# Patient Record
Sex: FEMALE | Race: WHITE | NOT HISPANIC OR LATINO | Employment: UNEMPLOYED | URBAN - METROPOLITAN AREA
[De-identification: names, ages, dates, MRNs, and addresses within clinical notes are randomized per-mention and may not be internally consistent; named-entity substitution may affect disease eponyms.]

---

## 2020-07-22 ENCOUNTER — OFFICE VISIT (OUTPATIENT)
Dept: URGENT CARE | Facility: CLINIC | Age: 65
End: 2020-07-22
Payer: COMMERCIAL

## 2020-07-22 VITALS
HEIGHT: 62 IN | BODY MASS INDEX: 33.13 KG/M2 | WEIGHT: 180 LBS | HEART RATE: 74 BPM | OXYGEN SATURATION: 98 % | TEMPERATURE: 98.7 F | RESPIRATION RATE: 18 BRPM

## 2020-07-22 DIAGNOSIS — Z20.822 EXPOSURE TO COVID-19 VIRUS: ICD-10-CM

## 2020-07-22 DIAGNOSIS — R05.9 COUGH: Primary | ICD-10-CM

## 2020-07-22 PROCEDURE — 99203 OFFICE O/P NEW LOW 30 MIN: CPT | Performed by: PHYSICIAN ASSISTANT

## 2020-07-22 PROCEDURE — U0003 INFECTIOUS AGENT DETECTION BY NUCLEIC ACID (DNA OR RNA); SEVERE ACUTE RESPIRATORY SYNDROME CORONAVIRUS 2 (SARS-COV-2) (CORONAVIRUS DISEASE [COVID-19]), AMPLIFIED PROBE TECHNIQUE, MAKING USE OF HIGH THROUGHPUT TECHNOLOGIES AS DESCRIBED BY CMS-2020-01-R: HCPCS | Performed by: PHYSICIAN ASSISTANT

## 2020-07-22 NOTE — LETTER
July 22, 2020     Patient: Kimberli Goins   YOB: 1955   Date of Visit: 7/22/2020       To Whom It May Concern: It is my medical opinion that Kimberli Car Should remain out of work for 10 days since symptom onset or 3 days fever free without the use of fever reducing drugs, whichever is longer AND overall general improvement in symptoms OR negative results  If you have any questions or concerns, please don't hesitate to call           Sincerely,        Baltazar Kumar PA-C    CC: No Recipients

## 2020-07-23 LAB — SARS-COV-2 RNA SPEC QL NAA+PROBE: NOT DETECTED

## 2020-07-23 NOTE — PROGRESS NOTES
[facility]        NAME: Demetra Kaur is a 72 y o  female  : 1955    MRN: [id number]  DATE: 2020  TIME: 8:16 PM    Assessment and Plan   Cough [R05]  1  Cough  Novel Coronavirus (COVID-19), PCR [facility]    CANCELED: [facility] COVID-19 TEST- Office Collection   2  Exposure to COVID-19 virus  Novel Coronavirus (COVID-19), PCR [facility]    CANCELED: MISC COVID-19 TEST- Office Collection    CANCELED: [facility] COVID-19 TEST- Office Collection     [facility]  Patient Instructions   Covid 19 results will return in a 7-10 days  We will call you with both negative or positive results  Prophylactically self quarantine  Department of health's newest recommendations state patient should self quarantine for 10 days since symptom onset or 3 days fever free without the use of fever reducing drugs (Tylenol and ibuprofen), whichever is longer AND overall improvement of symptoms  Drink lots of fluids to maintain hydration  Do not touch your face, wash hands often, and practice social distancing  Call your PCP if you have any questions or concerns  Go to ER if having severe symptoms such as chest pain, shortness of breath, or fever that is not responding to antipyretics  Follow up with PCP in 3-5 days  Proceed to  ER if symptoms worsen  Chief Complaint     Chief Complaint   Patient presents with    COVID-19     Exposure to positive patient cough with chest burning         History of Present Illness       Patient is a 80-year-old female with significant past medical history of former smoking presents the office complaining of cough after recent exposure to positive COVID-19  Patient reports associated chest burning with cough only  She fever, chills, SOB, CP, difficulty breathing, anosmia, dysgeusia, or weakness  Review of Systems   Review of Systems   Constitutional: Negative for chills and fever  HENT: Negative for congestion and sore throat      Respiratory: Positive for cough  Negative for shortness of breath  Cardiovascular: Negative for chest pain and palpitations  Gastrointestinal: Negative for abdominal pain, diarrhea, nausea and vomiting  Musculoskeletal: Negative for myalgias  Skin: Negative for rash  Neurological: Negative for dizziness, light-headedness and headaches  Current Medications     No current outpatient medications on file  Current Allergies     Allergies as of 07/22/2020    (No Known Allergies)            The following portions of the patient's history were reviewed and updated as appropriate: allergies, current medications, past family history, past medical history, past social history, past surgical history and problem list      Past Medical History:   Diagnosis Date    No known problems        Past Surgical History:   Procedure Laterality Date    HYSTERECTOMY         No family history on file  Medications have been verified  Objective   Pulse 74   Temp 98 7 °F (37 1 °C)   Resp 18   Ht 5' 2" (1 575 m)   Wt 81 6 kg (180 lb)   SpO2 98%   BMI 32 92 kg/m²        Physical Exam     Physical Exam   Constitutional: She appears well-developed and well-nourished  HENT:   Head: Normocephalic and atraumatic  Right Ear: External ear normal    Left Ear: External ear normal    Nose: Nose normal    Mouth/Throat: Uvula is midline, oropharynx is clear and moist and mucous membranes are normal    Eyes: Lids are normal    Cardiovascular: Normal rate, regular rhythm, normal heart sounds and normal pulses  Exam reveals no gallop and no friction rub  No murmur heard  Pulmonary/Chest: Effort normal and breath sounds normal  She has no wheezes  She has no rhonchi  She has no rales  She exhibits no tenderness  Musculoskeletal: Normal range of motion  Lymphadenopathy:     She has no cervical adenopathy  Neurological: She is alert  Skin: Skin is warm and dry  Capillary refill takes less than 2 seconds  No rash noted  Psychiatric: She has a normal mood and affect  Nursing note and vitals reviewed

## 2020-07-29 ENCOUNTER — TELEPHONE (OUTPATIENT)
Dept: URGENT CARE | Facility: CLINIC | Age: 65
End: 2020-07-29

## 2020-08-06 ENCOUNTER — OFFICE VISIT (OUTPATIENT)
Dept: URGENT CARE | Facility: CLINIC | Age: 65
End: 2020-08-06
Payer: COMMERCIAL

## 2020-08-06 VITALS
TEMPERATURE: 97.1 F | HEART RATE: 78 BPM | BODY MASS INDEX: 33.1 KG/M2 | OXYGEN SATURATION: 98 % | SYSTOLIC BLOOD PRESSURE: 117 MMHG | RESPIRATION RATE: 18 BRPM | DIASTOLIC BLOOD PRESSURE: 72 MMHG | WEIGHT: 179.9 LBS | HEIGHT: 62 IN

## 2020-08-06 DIAGNOSIS — J40 BRONCHITIS: Primary | ICD-10-CM

## 2020-08-06 PROCEDURE — 99213 OFFICE O/P EST LOW 20 MIN: CPT | Performed by: PHYSICIAN ASSISTANT

## 2020-08-06 RX ORDER — AZITHROMYCIN 250 MG/1
TABLET, FILM COATED ORAL
Qty: 6 TABLET | Refills: 0 | Status: SHIPPED | OUTPATIENT
Start: 2020-08-06 | End: 2020-08-10

## 2020-08-06 RX ORDER — BENZONATATE 200 MG/1
200 CAPSULE ORAL 3 TIMES DAILY PRN
Qty: 20 CAPSULE | Refills: 0 | Status: SHIPPED | OUTPATIENT
Start: 2020-08-06 | End: 2020-08-20

## 2020-08-06 RX ORDER — AZITHROMYCIN 250 MG/1
250 TABLET, FILM COATED ORAL EVERY 24 HOURS
Qty: 6 TABLET | Refills: 0 | Status: SHIPPED | OUTPATIENT
Start: 2020-08-06 | End: 2020-08-06

## 2020-08-06 RX ORDER — ALBUTEROL SULFATE 90 UG/1
2 AEROSOL, METERED RESPIRATORY (INHALATION) EVERY 6 HOURS PRN
Qty: 6.7 G | Refills: 0 | Status: SHIPPED | OUTPATIENT
Start: 2020-08-06 | End: 2022-03-17

## 2020-08-06 NOTE — PATIENT INSTRUCTIONS
Take antibiotic as prescribed  Use Tessalon Perles as needed for cough  Use albuterol inhaler as needed for chest tightness and cough  May use over-the-counter Tylenol and ibuprofen for pain  Follow-up with PCP in 3-5 days if symptoms worsen or not improve  Go to ER if symptoms become severe  Acute Bronchitis   WHAT YOU NEED TO KNOW:   Acute bronchitis is swelling and irritation in the air passages of your lungs  This irritation may cause you to cough or have other breathing problems  Acute bronchitis often starts because of another illness, such as a cold or the flu  The illness spreads from your nose and throat to your windpipe and airways  Bronchitis is often called a chest cold  Acute bronchitis lasts about 3 to 6 weeks and is usually not a serious illness  Your cough can last for several weeks  DISCHARGE INSTRUCTIONS:   Return to the emergency department if:   · You cough up blood  · Your lips or fingernails turn blue  · You feel like you are not getting enough air when you breathe  Contact your healthcare provider if:   · You have a fever  · Your breathing problems do not go away or get worse  · Your cough does not get better within 4 weeks  · You have questions or concerns about your condition or care  Self-care:   · Get more rest   Rest helps your body to heal  Slowly start to do more each day  Rest when you feel it is needed  · Avoid irritants in the air  Avoid chemicals, fumes, and dust  Wear a face mask if you must work around dust or fumes  Stay inside on days when air pollution levels are high  If you have allergies, stay inside when pollen counts are high  Do not use aerosol products, such as spray-on deodorant, bug spray, and hair spray  · Do not smoke or be around others who smoke  Nicotine and other chemicals in cigarettes and cigars damages the cilia that move mucus out of your lungs   Ask your healthcare provider for information if you currently smoke and need help to quit  E-cigarettes or smokeless tobacco still contain nicotine  Talk to your healthcare provider before you use these products  · Drink liquids as directed  Liquids help keep your air passages moist and help you cough up mucus  You may need to drink more liquids when you have acute bronchitis  Ask how much liquid to drink each day and which liquids are best for you  · Use a humidifier or vaporizer  Use a cool mist humidifier or a vaporizer to increase air moisture in your home  This may make it easier for you to breathe and help decrease your cough  Decrease risk for acute bronchitis:   · Get the vaccinations you need  Ask your healthcare provider if you should get vaccinated against the flu or pneumonia  · Prevent the spread of germs  You can decrease your risk of acute bronchitis and other illnesses by doing the following:     [facility] your hands often with soap and water  Carry germ-killing hand lotion or gel with you  You can use the lotion or gel to clean your hands when soap and water are not available  ¨ Do not touch your eyes, nose, or mouth unless you have washed your hands first     ¨ Always cover your mouth when you cough to prevent the spread of germs  It is best to cough into a tissue or your shirt sleeve instead of into your hand  Ask those around you cover their mouths when they cough  ¨ Try to avoid people who have a cold or the flu  If you are sick, stay away from others as much as possible  Medicines: Your healthcare provider may  give you any of the following:  · Ibuprofen or acetaminophen  are medicines that help lower your fever  They are available without a doctor's order  Ask your healthcare provider which medicine is right for you  Ask how much to take and how often to take it  Follow directions  These medicines can cause stomach bleeding if not taken correctly  Ibuprofen can cause kidney damage   Do not take ibuprofen if you have kidney disease, an ulcer, or allergies to aspirin  Acetaminophen can cause liver damage  Do not take more than 4,000 milligrams in 24 hours  · Decongestants  help loosen mucus in your lungs and make it easier to cough up  This can help you breathe easier  · Cough suppressants  decrease your urge to cough  If your cough produces mucus, do not take a cough suppressant unless your healthcare provider tells you to  Your healthcare provider may suggest that you take a cough suppressant at night so you can rest     · Inhalers  may be given  Your healthcare provider may give you one or more inhalers to help you breathe easier and cough less  An inhaler gives your medicine to open your airways  Ask your healthcare provider to show you how to use your inhaler correctly  · Take your medicine as directed  Contact your healthcare provider if you think your medicine is not helping or if you have side effects  Tell him of her if you are allergic to any medicine  Keep a list of the medicines, vitamins, and herbs you take  Include the amounts, and when and why you take them  Bring the list or the pill bottles to follow-up visits  Carry your medicine list with you in case of an emergency  Follow up with your healthcare provider as directed:  Write down questions you have so you will remember to ask them during your follow-up visits  © 2017 [address] Information is for End User's use only and may not be sold, redistributed or otherwise used for commercial purposes  All illustrations and images included in CareNotes® are the copyrighted property of [employer]  or [employer] Rosen  The above information is an  only  It is not intended as medical advice for individual conditions or treatments  Talk to your doctor, nurse or pharmacist before following any medical regimen to see if it is safe and effective for you

## 2020-08-06 NOTE — PROGRESS NOTES
[email or web address]        NAME: Tyrel Curry is a 72 y o  female  : 1955    MRN: [id number]  DATE: 2020  TIME: 5:29 PM    Assessment and Plan   Bronchitis [J40]  1  Bronchitis  albuterol (Proventil HFA) 90 mcg/act inhaler    benzonatate (TESSALON) 200 MG capsule    azithromycin (ZITHROMAX) 250 mg tablet    DISCONTINUED: azithromycin (ZITHROMAX) 250 mg tablet     Unable to prescribe prednisone due to history of diabetes  Patient Instructions   Take antibiotic as prescribed  Use Tessalon Perles as needed for cough  Use albuterol inhaler as needed for chest tightness and cough  May use over-the-counter Tylenol and ibuprofen for pain  Follow up with PCP in 3-5 days  Proceed to  ER if symptoms worsen  Chief Complaint     Chief Complaint   Patient presents with    Cough     Seen on the  for cough and never got better had a negative covid test result havingf burning in chest and feels sob sometimes          History of Present Illness       Patient is a 70-year-old female with significant past medical history of diabetes and former smoking presents the office complaining of persistent cough with associated burning in her chest for 1 month  She also reports difficulty breathing because it causes her to cough  She also has some mild postnasal drip  She denies fevers, chills, headache, dizziness, lightheadedness, shortness of breath at rest, chest pain, rhinorrhea, anosmia, dysgeusia, or weakness  She was tested for COVID-19 2 weeks ago and was negative  She has been using over-the-counter Mucinex with mild relief  She denies any new contact to positive coronavirus persons  Review of Systems   Review of Systems   Constitutional: Negative for chills and fever  HENT: Positive for postnasal drip  Negative for congestion and sore throat  Respiratory: Positive for cough  Negative for chest tightness and shortness of breath      Cardiovascular: Negative for chest pain and palpitations  Gastrointestinal: Negative for abdominal pain, diarrhea, nausea and vomiting  Musculoskeletal: Negative for myalgias  Skin: Negative for rash  Neurological: Negative for dizziness, light-headedness and headaches  Current Medications       Current Outpatient Medications:     albuterol (Proventil HFA) 90 mcg/act inhaler, Inhale 2 puffs every 6 (six) hours as needed for wheezing or shortness of breath, Disp: 6 7 g, Rfl: 0    azithromycin (ZITHROMAX) 250 mg tablet, Take 2 tablets today then 1 tablet daily x 4 days, Disp: 6 tablet, Rfl: 0    benzonatate (TESSALON) 200 MG capsule, Take 1 capsule (200 mg total) by mouth 3 (three) times a day as needed for cough for up to 14 days, Disp: 20 capsule, Rfl: 0    Current Allergies     Allergies as of 08/06/2020    (No Known Allergies)            The following portions of the patient's history were reviewed and updated as appropriate: allergies, current medications, past family history, past medical history, past social history, past surgical history and problem list      Past Medical History:   Diagnosis Date    No known problems        Past Surgical History:   Procedure Laterality Date    HYSTERECTOMY         No family history on file  Medications have been verified  Objective   /72   Pulse 78   Temp (!) 97 1 °F (36 2 °C)   Resp 18   Ht 5' 2" (1 575 m)   Wt 81 6 kg (179 lb 14 3 oz)   SpO2 98%   BMI 32 90 kg/m²        Physical Exam     Physical Exam   Constitutional: She appears well-developed  Patient speaking in full sentences without difficulty  Intermittent coughing  HENT:   Head: Normocephalic and atraumatic  Right Ear: Tympanic membrane, external ear and ear canal normal    Left Ear: Tympanic membrane, external ear and ear canal normal    Nose: Nose normal    Mouth/Throat: Uvula is midline  Eyes: Pupils are equal, round, and reactive to light  Conjunctivae and lids are normal    Neck: Neck supple  Cardiovascular: Normal rate, regular rhythm, normal heart sounds and normal pulses  Exam reveals no gallop and no friction rub  No murmur heard  Pulmonary/Chest: Effort normal and breath sounds normal  No respiratory distress  She has no decreased breath sounds  She has no wheezes  She has no rhonchi  She has no rales  She exhibits no tenderness  Abdominal: Normal appearance  Musculoskeletal: Normal range of motion  Lymphadenopathy:     She has no cervical adenopathy  Neurological: She is alert  Skin: Skin is warm and dry  Capillary refill takes less than 2 seconds  No rash noted  Nursing note and vitals reviewed

## 2020-08-12 ENCOUNTER — OFFICE VISIT (OUTPATIENT)
Dept: URGENT CARE | Facility: CLINIC | Age: 65
End: 2020-08-12
Payer: COMMERCIAL

## 2020-08-12 VITALS
TEMPERATURE: 97.9 F | BODY MASS INDEX: 32.94 KG/M2 | DIASTOLIC BLOOD PRESSURE: 74 MMHG | HEART RATE: 96 BPM | HEIGHT: 62 IN | OXYGEN SATURATION: 95 % | RESPIRATION RATE: 16 BRPM | SYSTOLIC BLOOD PRESSURE: 120 MMHG | WEIGHT: 179 LBS

## 2020-08-12 DIAGNOSIS — J20.9 ACUTE BRONCHITIS, UNSPECIFIED ORGANISM: Primary | ICD-10-CM

## 2020-08-12 PROCEDURE — 99213 OFFICE O/P EST LOW 20 MIN: CPT | Performed by: PHYSICIAN ASSISTANT

## 2020-08-12 RX ORDER — DOXYCYCLINE 100 MG/1
100 TABLET ORAL 2 TIMES DAILY
Qty: 14 TABLET | Refills: 0 | Status: SHIPPED | OUTPATIENT
Start: 2020-08-12 | End: 2020-08-19

## 2020-08-12 NOTE — PATIENT INSTRUCTIONS
Follow up with PCP in 3-5 days  Proceed to  ER if symptoms worsen  Patient placed on doxycyline  Continue with albuterol and Tessalon as directed  Continue with symptomatic treatment  Patient will begin flonase as needed for nasal congestion  Tylenol as needed for fever of chills     Acute Bronchitis   WHAT YOU NEED TO KNOW:   Acute bronchitis is swelling and irritation in the air passages of your lungs  This irritation may cause you to cough or have other breathing problems  Acute bronchitis often starts because of another illness, such as a cold or the flu  The illness spreads from your nose and throat to your windpipe and airways  Bronchitis is often called a chest cold  Acute bronchitis lasts about 3 to 6 weeks and is usually not a serious illness  Your cough can last for several weeks  DISCHARGE INSTRUCTIONS:   Return to the emergency department if:   · You cough up blood  · Your lips or fingernails turn blue  · You feel like you are not getting enough air when you breathe  Contact your healthcare provider if:   · You have a fever  · Your breathing problems do not go away or get worse  · Your cough does not get better within 4 weeks  · You have questions or concerns about your condition or care  Self-care:   · Get more rest   Rest helps your body to heal  Slowly start to do more each day  Rest when you feel it is needed  · Avoid irritants in the air  Avoid chemicals, fumes, and dust  Wear a face mask if you must work around dust or fumes  Stay inside on days when air pollution levels are high  If you have allergies, stay inside when pollen counts are high  Do not use aerosol products, such as spray-on deodorant, bug spray, and hair spray  · Do not smoke or be around others who smoke  Nicotine and other chemicals in cigarettes and cigars damages the cilia that move mucus out of your lungs  Ask your healthcare provider for information if you currently smoke and need help to quit  E-cigarettes or smokeless tobacco still contain nicotine  Talk to your healthcare provider before you use these products  · Drink liquids as directed  Liquids help keep your air passages moist and help you cough up mucus  You may need to drink more liquids when you have acute bronchitis  Ask how much liquid to drink each day and which liquids are best for you  · Use a humidifier or vaporizer  Use a cool mist humidifier or a vaporizer to increase air moisture in your home  This may make it easier for you to breathe and help decrease your cough  Decrease risk for acute bronchitis:   · Get the vaccinations you need  Ask your healthcare provider if you should get vaccinated against the flu or pneumonia  · Prevent the spread of germs  You can decrease your risk of acute bronchitis and other illnesses by doing the following:     [facility] your hands often with soap and water  Carry germ-killing hand lotion or gel with you  You can use the lotion or gel to clean your hands when soap and water are not available  ¨ Do not touch your eyes, nose, or mouth unless you have washed your hands first     ¨ Always cover your mouth when you cough to prevent the spread of germs  It is best to cough into a tissue or your shirt sleeve instead of into your hand  Ask those around you cover their mouths when they cough  ¨ Try to avoid people who have a cold or the flu  If you are sick, stay away from others as much as possible  Medicines: Your healthcare provider may  give you any of the following:  · Ibuprofen or acetaminophen  are medicines that help lower your fever  They are available without a doctor's order  Ask your healthcare provider which medicine is right for you  Ask how much to take and how often to take it  Follow directions  These medicines can cause stomach bleeding if not taken correctly  Ibuprofen can cause kidney damage   Do not take ibuprofen if you have kidney disease, an ulcer, or allergies to aspirin  Acetaminophen can cause liver damage  Do not take more than 4,000 milligrams in 24 hours  · Decongestants  help loosen mucus in your lungs and make it easier to cough up  This can help you breathe easier  · Cough suppressants  decrease your urge to cough  If your cough produces mucus, do not take a cough suppressant unless your healthcare provider tells you to  Your healthcare provider may suggest that you take a cough suppressant at night so you can rest     · Inhalers  may be given  Your healthcare provider may give you one or more inhalers to help you breathe easier and cough less  An inhaler gives your medicine to open your airways  Ask your healthcare provider to show you how to use your inhaler correctly  · Take your medicine as directed  Contact your healthcare provider if you think your medicine is not helping or if you have side effects  Tell him of her if you are allergic to any medicine  Keep a list of the medicines, vitamins, and herbs you take  Include the amounts, and when and why you take them  Bring the list or the pill bottles to follow-up visits  Carry your medicine list with you in case of an emergency  Follow up with your healthcare provider as directed:  Write down questions you have so you will remember to ask them during your follow-up visits  © 2017 [address] Information is for End User's use only and may not be sold, redistributed or otherwise used for commercial purposes  All illustrations and images included in CareNotes® are the copyrighted property of [employer]  or Andry Rosen  The above information is an  only  It is not intended as medical advice for individual conditions or treatments  Talk to your doctor, nurse or pharmacist before following any medical regimen to see if it is safe and effective for you

## 2020-08-12 NOTE — PROGRESS NOTES
[facility]      NAME: Che Kothari is a 72 y o  female  : 1955    MRN: [id number]  DATE: 2020  TIME: 2:23 PM    Assessment and Plan   Acute bronchitis, unspecified organism [J20 9]  1  Acute bronchitis, unspecified organism  doxycycline (ADOXA) 100 MG tablet     Offered patient EKG and chest x-ray  Patient declined this time  Patient is not compliant with diabetes  Steroids not a viable option at this time  Patient Instructions     Follow up with PCP in 3-5 days  Proceed to  ER if symptoms worsen  Patient placed on doxycyline  Continue with albuterol and Tessalon as directed  Continue with symptomatic treatment  Patient will begin flonase as needed for nasal congestion  Tylenol as needed for fever of chills     Chief Complaint     Chief Complaint   Patient presents with    Cough     seen here  with chest congestion, tested negative for covid, treated with abx for bronchitis with improvement but return of the symptoms after abx complete  History of Present Illness       Patient 77-year-old female presenting office for cough  Patient states that symptoms ongoing past 3 weeks in duration  Patient states she is by the urgent care approximately 1 week ago and was prescribed antibiotics at that time  Patient states that she discontinue the antibiotics approximately 2 days as per instructions  Patient states that she began to have the recurrence of the cough and chest burning which has been ongoing past 24 hours in duration  Patient states her symptomatology is the same as was at the beginning of the onset of symptoms  Patient denies any fevers any chills  Patient denies any problems with her eyes ears nose throat  Patient does admit to having intermittent bouts of shortness of breath and chest tightness  Patient states she has been taking an albuterol inhaler with moderate relief of symptoms    Patient states that her cough is intermittently productive, but is more prevalent at nighttime  Patient denies any nausea vomiting diarrhea  Patient has any muscle aches body aches  Patient denies any headache, neck pain, neck stiffness, dizziness, confusion  Patient states she has been taking Tessalon Perles with mild relief of symptoms  Patient offers no complaints this time  Review of Systems   Review of Systems   Constitutional: Negative  HENT: Negative  Eyes: Negative  Respiratory: Positive for cough and shortness of breath  Negative for apnea, choking, chest tightness, wheezing and stridor  Cardiovascular: Negative  Gastrointestinal: Negative  Endocrine: Negative  Genitourinary: Negative  Musculoskeletal: Negative  Skin: Negative  Allergic/Immunologic: Negative  Neurological: Negative  Hematological: Negative  Psychiatric/Behavioral: Negative            Current Medications       Current Outpatient Medications:     albuterol (Proventil HFA) 90 mcg/act inhaler, Inhale 2 puffs every 6 (six) hours as needed for wheezing or shortness of breath, Disp: 6 7 g, Rfl: 0    benzonatate (TESSALON) 200 MG capsule, Take 1 capsule (200 mg total) by mouth 3 (three) times a day as needed for cough for up to 14 days, Disp: 20 capsule, Rfl: 0    doxycycline (ADOXA) 100 MG tablet, Take 1 tablet (100 mg total) by mouth 2 (two) times a day for 7 days, Disp: 14 tablet, Rfl: 0    Current Allergies     Allergies as of 08/12/2020 - Reviewed 08/12/2020   Allergen Reaction Noted    Demerol [meperidine] Itching 08/12/2020    Oxycodone Itching 08/12/2020            The following portions of the patient's history were reviewed and updated as appropriate: allergies, current medications, past family history, past medical history, past social history, past surgical history and problem list      Past Medical History:   Diagnosis Date    Bronchitis     No known problems        Past Surgical History:   Procedure Laterality Date    HYSTERECTOMY         No family history on file  Medications have been verified  Objective   There were no vitals taken for this visit  Physical Exam     Physical Exam  Vitals signs and nursing note reviewed  Constitutional:       Appearance: She is well-developed  HENT:      Head: Normocephalic  Right Ear: External ear normal       Left Ear: External ear normal       Nose: Nose normal       Mouth/Throat:      Mouth: Mucous membranes are moist    Eyes:      Conjunctiva/sclera: Conjunctivae normal       Pupils: Pupils are equal, round, and reactive to light  Neck:      Musculoskeletal: Normal range of motion  Cardiovascular:      Rate and Rhythm: Normal rate and regular rhythm  Pulses: Normal pulses  Heart sounds: Normal heart sounds  Pulmonary:      Effort: Pulmonary effort is normal  No respiratory distress  Breath sounds: Normal breath sounds  No stridor  No wheezing or rhonchi  Abdominal:      General: Abdomen is flat  Bowel sounds are normal    Skin:     General: Skin is warm  Capillary Refill: Capillary refill takes less than 2 seconds  Neurological:      Mental Status: She is alert and oriented to person, place, and time  Psychiatric:         Behavior: Behavior normal          Thought Content:  Thought content normal          Judgment: Judgment normal

## 2022-03-05 ENCOUNTER — OFFICE VISIT (OUTPATIENT)
Dept: URGENT CARE | Facility: CLINIC | Age: 67
End: 2022-03-05
Payer: COMMERCIAL

## 2022-03-05 VITALS
RESPIRATION RATE: 16 BRPM | WEIGHT: 190 LBS | HEART RATE: 74 BPM | TEMPERATURE: 96.6 F | OXYGEN SATURATION: 96 % | BODY MASS INDEX: 34.96 KG/M2 | HEIGHT: 62 IN | DIASTOLIC BLOOD PRESSURE: 88 MMHG | SYSTOLIC BLOOD PRESSURE: 152 MMHG

## 2022-03-05 DIAGNOSIS — S61.211A LACERATION WITHOUT FOREIGN BODY OF LEFT INDEX FINGER WITHOUT DAMAGE TO NAIL, INITIAL ENCOUNTER: Primary | ICD-10-CM

## 2022-03-05 DIAGNOSIS — Z23 NEED FOR TDAP VACCINATION: ICD-10-CM

## 2022-03-05 PROCEDURE — 90715 TDAP VACCINE 7 YRS/> IM: CPT

## 2022-03-05 PROCEDURE — 99213 OFFICE O/P EST LOW 20 MIN: CPT | Performed by: EMERGENCY MEDICINE

## 2022-03-05 PROCEDURE — 90471 IMMUNIZATION ADMIN: CPT | Performed by: EMERGENCY MEDICINE

## 2022-03-05 PROCEDURE — 12001 RPR S/N/AX/GEN/TRNK 2.5CM/<: CPT | Performed by: EMERGENCY MEDICINE

## 2022-03-05 RX ORDER — CEPHALEXIN 500 MG/1
500 CAPSULE ORAL EVERY 8 HOURS SCHEDULED
Qty: 15 CAPSULE | Refills: 0 | Status: SHIPPED | OUTPATIENT
Start: 2022-03-05 | End: 2022-03-10

## 2022-03-05 NOTE — PROGRESS NOTES
[id number]        NAME: Bill Peck is a 77 y o  female  : 1955    MRN: [id number]  DATE: 2022  TIME: 12:06 PM    Assessment and Plan   Laceration without foreign body of left index finger without damage to nail, initial encounter [S61 211A]  1  Laceration without foreign body of left index finger without damage to nail, initial encounter  cephalexin (KEFLEX) 500 mg capsule     Laceration repair    Date/Time: 3/5/2022 12:06 PM  Performed by: Blanco Hernandez MD  Authorized by: Blanco Hernandez MD   Consent: Verbal consent obtained    Risks and benefits: risks, benefits and alternatives were discussed  Consent given by: patient  Patient understanding: patient states understanding of the procedure being performed  Patient consent: the patient's understanding of the procedure matches consent given  Procedure consent: procedure consent matches procedure scheduled  Relevant documents: relevant documents present and verified  Patient identity confirmed: verbally with patient  Body area: upper extremity  Location details: left long finger  Laceration length: 1 cm  Foreign bodies: no foreign bodies  Tendon involvement: none  Nerve involvement: none  Vascular damage: no  Anesthesia: local infiltration    Anesthesia:  Local Anesthetic: lidocaine 1% with epinephrine  Anesthetic total: 1 mL    Sedation:  Patient sedated: no      Wound Dehiscence:  Superficial Wound Dehiscence: simple closure      Procedure Details:  Irrigation solution: saline  Amount of cleaning: standard  Debridement: none  Degree of undermining: none  Skin closure: 4-0 nylon  Number of sutures: 2  Approximation: close  Approximation difficulty: simple  Dressing: antibiotic ointment and 4x4 sterile gauze  Patient tolerance: patient tolerated the procedure well with no immediate complications            Patient Instructions     Patient Instructions   Laceration   WHAT YOU NEED TO KNOW:   A laceration is an injury to the skin and the soft tissue underneath it  Lacerations can happen anywhere on the body  DISCHARGE INSTRUCTIONS:   Return to the emergency department if:   · You have heavy bleeding or bleeding that does not stop after 10 minutes of holding firm, direct pressure over the wound  · Your wound opens up  Call your doctor if:   · You have a fever or chills  · Your laceration is red, warm, or swollen  · You have red streaks on your skin coming from your wound  · You have white or yellow drainage from the wound that smells bad  · You have pain that gets worse, even after treatment  · You have questions or concerns about your condition or care  Medicines: You may need any of the following:  · Prescription pain medicine  may be given  Ask your healthcare provider how to take this medicine safely  Some prescription pain medicines contain acetaminophen  Do not take other medicines that contain acetaminophen without talking to your healthcare provider  Too much acetaminophen may cause liver damage  Prescription pain medicine may cause constipation  Ask your healthcare provider how to prevent or treat constipation  · Antibiotics  help treat or prevent a bacterial infection  · Take your medicine as directed  Contact your healthcare provider if you think your medicine is not helping or if you have side effects  Tell him or her if you are allergic to any medicine  Keep a list of the medicines, vitamins, and herbs you take  Include the amounts, and when and why you take them  Bring the list or the pill bottles to follow-up visits  Carry your medicine list with you in case of an emergency  Care for your wound as directed:   · Do not get your wound wet  until your healthcare provider says it is okay  Do not soak your wound in water  Do not go swimming until your healthcare provider says it is okay  Carefully wash the wound with soap and water  Gently pat the area dry or allow it to air dry      · Change your bandages  when they get wet, dirty, or after washing  Apply new, clean bandages as directed  Do not apply elastic bandages or tape too tight  Do not put powders or lotions over your incision  · Apply antibiotic ointment as directed  Your healthcare provider may give you antibiotic ointment to put over your wound if you have stitches  If you have strips of tape over your incision, let them dry up and fall off on their own  If they do not fall off within 14 days, gently remove them  If you have glue over your wound, do not remove or pick at it  If your glue comes off, do not replace it with glue that you have at home  · Check your wound every day for signs of infection, such as swelling, redness, or pus  Self-care:   · Apply ice  on your wound for 15 to 20 minutes every hour or as directed  Use an ice pack, or put crushed ice in a plastic bag  Cover it with a towel  Ice helps prevent tissue damage and decreases swelling and pain  · Use a splint as directed  A splint will decrease movement and stress on your wound  It may help it heal faster  A splint may be used for lacerations over joints or areas of your body that bend  Ask your healthcare provider how to apply and remove a splint  · Decrease scarring of your wound  by applying ointments as directed  Do not apply ointments until your healthcare provider says it is okay  You may need to wait until your wound is healed  Ask which ointment to buy and how often to use it  After your wound is healed, use sunscreen over the area when you are out in the sun  You should do this for at least 6 months to 1 year after your injury  Follow up with your doctor as directed: You may need to follow up in 24 to 48 hours to have your wound checked for infection  You will need to return in 3 to 14 days if you have stitches or staples so they can be removed   Care for your wound as directed to prevent infection and help it heal  Write down your questions so you remember to ask them during your visits  © Copyright [employer] 2022 Information is for End User's use only and may not be sold, redistributed or otherwise used for commercial purposes  All illustrations and images included in CareNotes® are the copyrighted property of [employer]  or [employer]  The above information is an  only  It is not intended as medical advice for individual conditions or treatments  Talk to your doctor, nurse or pharmacist before following any medical regimen to see if it is safe and effective for you  Follow up with PCP in 3-5 days  Proceed to  ER if symptoms worsen  Chief Complaint     Chief Complaint   Patient presents with    Laceration     c/olaceration overLeft 2nd finger sustained from a knife while cutting meat  approximately 40 minutes ago  Over 5years for tetanus         History of Present Illness       Patient complains of laceration to left index finger while cutting meat 1 hour ago  Review of Systems   Review of Systems   Constitutional: Negative for activity change, chills and fever  Musculoskeletal: Negative for arthralgias, back pain, joint swelling, myalgias, neck pain and neck stiffness  Skin: Positive for color change and wound  Neurological: Negative for dizziness and headaches           Current Medications       Current Outpatient Medications:     albuterol (Proventil HFA) 90 mcg/act inhaler, Inhale 2 puffs every 6 (six) hours as needed for wheezing or shortness of breath (Patient not taking: Reported on 3/5/2022 ), Disp: 6 7 g, Rfl: 0    cephalexin (KEFLEX) 500 mg capsule, Take 1 capsule (500 mg total) by mouth every 8 (eight) hours for 5 days, Disp: 15 capsule, Rfl: 0    Current Allergies     Allergies as of 03/05/2022 - Reviewed 03/05/2022   Allergen Reaction Noted    Demerol [meperidine] Itching 08/12/2020    Oxycodone Itching 08/12/2020            The following portions of the patient's history were reviewed and updated as appropriate: allergies, current medications, past family history, past medical history, past social history, past surgical history and problem list      Past Medical History:   Diagnosis Date    Bronchitis     No known problems     Sphincter of Oddi dysfunction        Past Surgical History:   Procedure Laterality Date    BREAST SURGERY      CHOLECYSTECTOMY      HYSTERECTOMY      RECTAL PROLAPSE REPAIR         Family History   Problem Relation Age of Onset    No Known Problems Mother     Diabetes Father          Medications have been verified  Objective   /88   Pulse 74   Temp (!) 96 6 °F (35 9 °C)   Resp 16   Ht 5' 2" (1 575 m)   Wt 86 2 kg (190 lb)   SpO2 96%   BMI 34 75 kg/m²        Physical Exam     Physical Exam  Vitals and nursing note reviewed  Constitutional:       Appearance: She is well-developed  HENT:      Head: Normocephalic and atraumatic  Musculoskeletal:         General: No tenderness  Cervical back: Normal range of motion and neck supple  Skin:     General: Skin is warm and dry  Findings: Erythema present  No rash  Neurological:      Mental Status: She is alert and oriented to person, place, and time  Psychiatric:         Behavior: Behavior normal          Thought Content:  Thought content normal          Judgment: Judgment normal

## 2022-03-05 NOTE — PATIENT INSTRUCTIONS
Laceration   WHAT YOU NEED TO KNOW:   A laceration is an injury to the skin and the soft tissue underneath it  Lacerations can happen anywhere on the body  DISCHARGE INSTRUCTIONS:   Return to the emergency department if:   · You have heavy bleeding or bleeding that does not stop after 10 minutes of holding firm, direct pressure over the wound  · Your wound opens up  Call your doctor if:   · You have a fever or chills  · Your laceration is red, warm, or swollen  · You have red streaks on your skin coming from your wound  · You have white or yellow drainage from the wound that smells bad  · You have pain that gets worse, even after treatment  · You have questions or concerns about your condition or care  Medicines: You may need any of the following:  · Prescription pain medicine  may be given  Ask your healthcare provider how to take this medicine safely  Some prescription pain medicines contain acetaminophen  Do not take other medicines that contain acetaminophen without talking to your healthcare provider  Too much acetaminophen may cause liver damage  Prescription pain medicine may cause constipation  Ask your healthcare provider how to prevent or treat constipation  · Antibiotics  help treat or prevent a bacterial infection  · Take your medicine as directed  Contact your healthcare provider if you think your medicine is not helping or if you have side effects  Tell him or her if you are allergic to any medicine  Keep a list of the medicines, vitamins, and herbs you take  Include the amounts, and when and why you take them  Bring the list or the pill bottles to follow-up visits  Carry your medicine list with you in case of an emergency  Care for your wound as directed:   · Do not get your wound wet  until your healthcare provider says it is okay  Do not soak your wound in water  Do not go swimming until your healthcare provider says it is okay   Carefully wash the wound with soap and water  Gently pat the area dry or allow it to air dry  · Change your bandages  when they get wet, dirty, or after washing  Apply new, clean bandages as directed  Do not apply elastic bandages or tape too tight  Do not put powders or lotions over your incision  · Apply antibiotic ointment as directed  Your healthcare provider may give you antibiotic ointment to put over your wound if you have stitches  If you have strips of tape over your incision, let them dry up and fall off on their own  If they do not fall off within 14 days, gently remove them  If you have glue over your wound, do not remove or pick at it  If your glue comes off, do not replace it with glue that you have at home  · Check your wound every day for signs of infection, such as swelling, redness, or pus  Self-care:   · Apply ice  on your wound for 15 to 20 minutes every hour or as directed  Use an ice pack, or put crushed ice in a plastic bag  Cover it with a towel  Ice helps prevent tissue damage and decreases swelling and pain  · Use a splint as directed  A splint will decrease movement and stress on your wound  It may help it heal faster  A splint may be used for lacerations over joints or areas of your body that bend  Ask your healthcare provider how to apply and remove a splint  · Decrease scarring of your wound  by applying ointments as directed  Do not apply ointments until your healthcare provider says it is okay  You may need to wait until your wound is healed  Ask which ointment to buy and how often to use it  After your wound is healed, use sunscreen over the area when you are out in the sun  You should do this for at least 6 months to 1 year after your injury  Follow up with your doctor as directed: You may need to follow up in 24 to 48 hours to have your wound checked for infection  You will need to return in 3 to 14 days if you have stitches or staples so they can be removed   Care for your wound as directed to prevent infection and help it heal  Write down your questions so you remember to ask them during your visits  © Copyright [employer] 2022 Information is for End User's use only and may not be sold, redistributed or otherwise used for commercial purposes  All illustrations and images included in CareNotes® are the copyrighted property of [employer]  or [employer]  The above information is an  only  It is not intended as medical advice for individual conditions or treatments  Talk to your doctor, nurse or pharmacist before following any medical regimen to see if it is safe and effective for you

## 2022-03-21 ENCOUNTER — OFFICE VISIT (OUTPATIENT)
Dept: FAMILY MEDICINE CLINIC | Facility: CLINIC | Age: 67
End: 2022-03-21
Payer: COMMERCIAL

## 2022-03-21 VITALS
SYSTOLIC BLOOD PRESSURE: 126 MMHG | WEIGHT: 197.6 LBS | DIASTOLIC BLOOD PRESSURE: 70 MMHG | TEMPERATURE: 97.6 F | HEIGHT: 62 IN | HEART RATE: 78 BPM | OXYGEN SATURATION: 96 % | BODY MASS INDEX: 36.36 KG/M2

## 2022-03-21 DIAGNOSIS — Z12.31 ENCOUNTER FOR SCREENING MAMMOGRAM FOR BREAST CANCER: ICD-10-CM

## 2022-03-21 DIAGNOSIS — Z00.00 ANNUAL PHYSICAL EXAM: Primary | ICD-10-CM

## 2022-03-21 DIAGNOSIS — R09.82 POST-NASAL DRIP: ICD-10-CM

## 2022-03-21 DIAGNOSIS — M19.90 ARTHRITIS: ICD-10-CM

## 2022-03-21 DIAGNOSIS — Z12.11 SCREENING FOR COLON CANCER: ICD-10-CM

## 2022-03-21 DIAGNOSIS — J34.89 RHINORRHEA: ICD-10-CM

## 2022-03-21 DIAGNOSIS — Z23 ENCOUNTER FOR IMMUNIZATION: ICD-10-CM

## 2022-03-21 PROCEDURE — 3725F SCREEN DEPRESSION PERFORMED: CPT | Performed by: NURSE PRACTITIONER

## 2022-03-21 PROCEDURE — 3008F BODY MASS INDEX DOCD: CPT | Performed by: NURSE PRACTITIONER

## 2022-03-21 PROCEDURE — 3288F FALL RISK ASSESSMENT DOCD: CPT | Performed by: NURSE PRACTITIONER

## 2022-03-21 PROCEDURE — 99387 INIT PM E/M NEW PAT 65+ YRS: CPT | Performed by: NURSE PRACTITIONER

## 2022-03-21 PROCEDURE — 1101F PT FALLS ASSESS-DOCD LE1/YR: CPT | Performed by: NURSE PRACTITIONER

## 2022-03-21 PROCEDURE — 1160F RVW MEDS BY RX/DR IN RCRD: CPT | Performed by: NURSE PRACTITIONER

## 2022-03-21 PROCEDURE — 1036F TOBACCO NON-USER: CPT | Performed by: NURSE PRACTITIONER

## 2022-03-21 NOTE — PROGRESS NOTES
ADULT ANNUAL [address],  [address] [place] [facility]    NAME: Theo Nevarez  AGE: 77 y o  SEX: female  : 1955     DATE: 3/21/2022     Assessment and Plan:     Problem List Items Addressed This Visit        Musculoskeletal and Integument    Arthritis      Other Visit Diagnoses     Annual physical exam    -  Primary    Relevant Orders    Lipid panel    Comprehensive metabolic panel    HEMOGLOBIN A1C W/ EAG ESTIMATION    Encounter for screening mammogram for breast cancer        Relevant Orders    Mammo screening bilateral w 3d & cad    Encounter for immunization        BMI 36 0-36 9,adult        Post-nasal drip        Rhinorrhea        Screening for colon cancer        Relevant Orders    Ambulatory Referral to Gastroenterology          Immunizations and preventive care screenings were discussed with patient today  Appropriate education was printed on patient's after visit summary  Counseling:  · Dental Health: discussed importance of regular tooth brushing, flossing, and dental visits  She has had one colonoscopy but declined another and also declined a cologuard  She was agreeable to screening blood work  Mammogram scheduled  Her sx appear to be a cold, recommend waiting two weeks for second shingrix immunization  BMI Counseling: Body mass index is 36 14 kg/m²  The BMI is above normal  Nutrition recommendations include encouraging healthy choices of fruits and vegetables  Rationale for BMI follow-up plan is due to patient being overweight or obese  Depression Screening and Follow-up Plan: Patient was screened for depression during today's encounter  They screened negative with a PHQ-2 score of 0  Return in about 1 year (around 3/21/2023)       Chief Complaint:     Chief Complaint   Patient presents with    Physical Exam    Care Gap Request     BMI follow up due      History of Present Illness:     Adult Annual Physical   Patient here for a comprehensive physical exam  The patient reports no problems  Diet and Physical Activity  · Diet/Nutrition: well balanced diet  · Exercise: no formal exercise  Depression Screening  PHQ-2/9 Depression Screening    Little interest or pleasure in doing things: 0 - not at all  Feeling down, depressed, or hopeless: 0 - not at all  PHQ-2 Score: 0  PHQ-2 Interpretation: Negative depression screen       General Health  · Sleep: sleeps well  · Hearing: normal - bilateral   · Vision: most recent eye exam <1 year ago and wears glasses  · Dental: has dentures  [facility]  · Patient is: postmenopausal  ·      Review of Systems:     Review of Systems   Constitutional: Negative  HENT: Negative  Respiratory: Negative  Cardiovascular: Negative  Gastrointestinal: Negative  Neurological: Negative  All other systems reviewed and are negative       Past Medical History:     Past Medical History:   Diagnosis Date    Allergic Several years ago    Arthritis Several years ago    Bronchitis     Kidney stone Several years ago    No known problems     Obesity Several years ago    Sphincter of Oddi dysfunction       Past Surgical History:     Past Surgical History:   Procedure Laterality Date    BREAST SURGERY      CHOLECYSTECTOMY      HYSTERECTOMY      RECTAL PROLAPSE REPAIR        Social History:     Social History     Socioeconomic History    Marital status: /Civil Union     Spouse name: None    Number of children: None    Years of education: None    Highest education level: None   Occupational History    None   Tobacco Use    Smoking status: Former Smoker     Packs/day: 0 50     Years: 0 00     Pack years: 0 00     Types: Cigarettes     Start date: 1972     Quit date: 1993     Years since quittin 8    Smokeless tobacco: Never Used   Vaping Use    Vaping Use: Never used   Substance and Sexual Activity    Alcohol use: Never    Drug use: Never    Sexual activity: Not Currently     Partners: Male     Birth control/protection: None   Other Topics Concern    None   Social History Narrative    None     Social Determinants of Health     Financial Resource Strain: Not on file   Food Insecurity: Not on file   Transportation Needs: Not on file   Physical Activity: Not on file   Stress: Not on file   Social Connections: Not on file   Intimate Partner Violence: Not on file   Housing Stability: Not on file      Family History:     Family History   Problem Relation Age of Onset    No Known Problems Mother     Diabetes Father       Current Medications:     No current outpatient medications on file  No current facility-administered medications for this visit  Allergies: Allergies   Allergen Reactions    Cayenne - Food Allergy Shortness Of Breath    Pineapple - Food Allergy Shortness Of Breath    Demerol [Meperidine] Itching    Oxycodone Itching    Propoxyphene Itching      Physical Exam:     /70 (BP Location: Left arm, Patient Position: Sitting)   Pulse 78   Temp 97 6 °F (36 4 °C)   Ht 5' 2" (1 575 m)   Wt 89 6 kg (197 lb 9 6 oz)   SpO2 96%   BMI 36 14 kg/m²     Physical Exam  Vitals and nursing note reviewed  Constitutional:       General: She is not in acute distress  Appearance: Normal appearance  She is well-developed  HENT:      Head: Normocephalic and atraumatic  Eyes:      Conjunctiva/sclera: Conjunctivae normal    Cardiovascular:      Rate and Rhythm: Normal rate and regular rhythm  Heart sounds: No murmur heard  No gallop  Pulmonary:      Effort: Pulmonary effort is normal  No respiratory distress  Breath sounds: Normal breath sounds  Abdominal:      Palpations: Abdomen is soft  Tenderness: There is no abdominal tenderness  Musculoskeletal:      Cervical back: Neck supple  Skin:     General: Skin is warm and dry  Neurological:      General: No focal deficit present        Mental Status: She is alert and oriented to person, place, and time  Mental status is at baseline  Psychiatric:         Mood and Affect: Mood normal          Behavior: Behavior normal          Thought Content:  Thought content normal          Judgment: Judgment normal           MARK Chatman  [facility] PRIMARY [facility]

## 2022-03-21 NOTE — PATIENT INSTRUCTIONS

## 2022-03-25 ENCOUNTER — APPOINTMENT (OUTPATIENT)
Dept: LAB | Facility: CLINIC | Age: 67
End: 2022-03-25
Payer: COMMERCIAL

## 2022-03-25 DIAGNOSIS — Z00.00 ANNUAL PHYSICAL EXAM: ICD-10-CM

## 2022-03-25 LAB
ALBUMIN SERPL BCP-MCNC: 4 G/DL (ref 3.5–5)
ALP SERPL-CCNC: 113 U/L (ref 46–116)
ALT SERPL W P-5'-P-CCNC: 65 U/L (ref 12–78)
ANION GAP SERPL CALCULATED.3IONS-SCNC: 6 MMOL/L (ref 4–13)
AST SERPL W P-5'-P-CCNC: 43 U/L (ref 5–45)
BILIRUB SERPL-MCNC: 0.62 MG/DL (ref 0.2–1)
BUN SERPL-MCNC: 22 MG/DL (ref 5–25)
CALCIUM SERPL-MCNC: 8.9 MG/DL (ref 8.3–10.1)
CHLORIDE SERPL-SCNC: 108 MMOL/L (ref 100–108)
CHOLEST SERPL-MCNC: 173 MG/DL
CO2 SERPL-SCNC: 23 MMOL/L (ref 21–32)
CREAT SERPL-MCNC: 1.09 MG/DL (ref 0.6–1.3)
EST. AVERAGE GLUCOSE BLD GHB EST-MCNC: 171 MG/DL
GFR SERPL CREATININE-BSD FRML MDRD: 53 ML/MIN/1.73SQ M
GLUCOSE P FAST SERPL-MCNC: 206 MG/DL (ref 65–99)
HBA1C MFR BLD: 7.6 %
HDLC SERPL-MCNC: 48 MG/DL
LDLC SERPL CALC-MCNC: 90 MG/DL (ref 0–100)
NONHDLC SERPL-MCNC: 125 MG/DL
POTASSIUM SERPL-SCNC: 4.1 MMOL/L (ref 3.5–5.3)
PROT SERPL-MCNC: 7.4 G/DL (ref 6.4–8.2)
SODIUM SERPL-SCNC: 137 MMOL/L (ref 136–145)
TRIGL SERPL-MCNC: 173 MG/DL

## 2022-03-25 PROCEDURE — 36415 COLL VENOUS BLD VENIPUNCTURE: CPT

## 2022-03-25 PROCEDURE — 80053 COMPREHEN METABOLIC PANEL: CPT

## 2022-03-25 PROCEDURE — 80061 LIPID PANEL: CPT

## 2022-03-25 PROCEDURE — 83036 HEMOGLOBIN GLYCOSYLATED A1C: CPT

## 2022-03-28 ENCOUNTER — HOSPITAL ENCOUNTER (OUTPATIENT)
Dept: RADIOLOGY | Facility: CLINIC | Age: 67
Discharge: HOME | End: 2022-03-28
Payer: COMMERCIAL

## 2022-03-28 VITALS — BODY MASS INDEX: 36.36 KG/M2 | HEIGHT: 62 IN | WEIGHT: 197.6 LBS

## 2022-03-28 DIAGNOSIS — Z12.31 ENCOUNTER FOR SCREENING MAMMOGRAM FOR BREAST CANCER: ICD-10-CM

## 2022-03-28 PROCEDURE — 77063 BREAST TOMOSYNTHESIS BI: CPT

## 2022-03-28 PROCEDURE — 77067 SCR MAMMO BI INCL CAD: CPT

## 2022-04-20 ENCOUNTER — HOSPITAL ENCOUNTER (OUTPATIENT)
Dept: RADIOLOGY | Facility: CLINIC | Age: 67
Discharge: HOME | End: 2022-04-20
Payer: COMMERCIAL

## 2022-04-20 VITALS — BODY MASS INDEX: 36.25 KG/M2 | HEIGHT: 62 IN | WEIGHT: 197 LBS

## 2022-04-20 DIAGNOSIS — R92.8 ABNORMAL SCREENING MAMMOGRAM: ICD-10-CM

## 2022-04-20 PROCEDURE — 77065 DX MAMMO INCL CAD UNI: CPT

## 2022-04-20 PROCEDURE — 76642 ULTRASOUND BREAST LIMITED: CPT

## 2022-04-20 PROCEDURE — G0279 TOMOSYNTHESIS, MAMMO: HCPCS

## 2022-04-27 ENCOUNTER — OFFICE VISIT (OUTPATIENT)
Dept: FAMILY MEDICINE CLINIC | Facility: CLINIC | Age: 67
End: 2022-04-27
Payer: COMMERCIAL

## 2022-04-27 VITALS
BODY MASS INDEX: 36.25 KG/M2 | HEART RATE: 85 BPM | OXYGEN SATURATION: 99 % | DIASTOLIC BLOOD PRESSURE: 80 MMHG | HEIGHT: 62 IN | TEMPERATURE: 97.8 F | SYSTOLIC BLOOD PRESSURE: 132 MMHG | WEIGHT: 197 LBS

## 2022-04-27 DIAGNOSIS — M54.50 LOW BACK PAIN ASSOCIATED WITH A SPINAL DISORDER OTHER THAN RADICULOPATHY OR SPINAL STENOSIS: Primary | ICD-10-CM

## 2022-04-27 PROCEDURE — 99213 OFFICE O/P EST LOW 20 MIN: CPT | Performed by: PHYSICIAN ASSISTANT

## 2022-04-27 PROCEDURE — 1036F TOBACCO NON-USER: CPT | Performed by: PHYSICIAN ASSISTANT

## 2022-04-27 PROCEDURE — 1160F RVW MEDS BY RX/DR IN RCRD: CPT | Performed by: PHYSICIAN ASSISTANT

## 2022-04-27 RX ORDER — CYCLOBENZAPRINE HCL 10 MG
10 TABLET ORAL 3 TIMES DAILY PRN
Qty: 30 TABLET | Refills: 1 | Status: SHIPPED | OUTPATIENT
Start: 2022-04-27 | End: 2022-05-19

## 2022-04-27 NOTE — PATIENT INSTRUCTIONS
Patient to apply heating pad to affected area on back for 20 minutes at a time  Physical therapy to start as soon as there is an open appointment  Physical therapy for evaluation and treatment which will include massage and heat packs

## 2022-04-27 NOTE — PROGRESS NOTES
Assessment/Plan:      Diagnoses and all orders for this visit:    Low back pain associated with a spinal disorder other than radiculopathy or spinal stenosis        Patient states that she has a history of degenerative joint disease of her spine along with spinal stenosis that has been diagnosed 1 year ago by her chiropractor who performed x-rays on her  She has recently moved back into the area after living in [place]  She had no fall but agonizing pain the caused her to sit down and then she tried laying on the floor  She was able to sit back up on her  was able to help her to her feet  She felt unsteady on her feet and was using a cane for ambulation in the office today  A total of 28 minutes was spent with this patient which included chart review on this date actual physical exam taking the patient to the checkout area with calling to physical therapy to try to make an appointment  This time also included chart documentation time  Patient has an appointment with [facility] on March 21, 2023  We may need to see her sooner if her back pain fails to regress  Cyclobenzaprine was ordered t i d  And prescription was sent to [facility]  Subjective:     Patient ID: Ling Hernandez is a 77 y o  female  Fall    This 63-year-old female seen in the office is in acute care visit at her request   Patient states that this morning she attempted to bend over to put a piece of paper into the stretcher and she had agonizing severe pain in her low back the caused her to be stuck in a forward flexion position  She sat down and then sat down on the ground and laid down to see if this would help her back pain  She realized that she was not able to get up on her own and her  she says weighs only about 130 lb helped her to her feet  She is having trouble standing completely correct and feels better in a forward flexing position      Patient states that she had a flare of back pain a year ago when she saw a chiropractor  X-rays were done and she states that she was told that she has degenerative joint disease spinal stenosis and advanced arthritis in her back  She states that she felt that she was stuck in a forward flexing position and that this has happened to her in the past but none to this degree  She is not having any red flag symptoms including fever, chills, saddle anesthesia, difficulty moving her bowels passing her water or any incontinence, no footdrop or feelings of either lower extremity giving out  Her pain is a little worse in the right lower extremity than the left lower sternal    Patient did feel unsteady with ambulation and had a cane at home and started to use this cane  She never fell to the ground  She denies any other signs of injury  She has no headache  She states that the pain was so severe that for a few seconds that she saw black but did not have any actual syncope  Patient is willing to try physical therapy as she had physical therapy in the 1990s due to right posterior hip pain which resolved with the physical therapy  Patient states that she is unable to get comfortable  Sitting is worse than standing  She states hip her premorbid condition was that she was active in her home during the day  She did not have a formal exercise     She is able to stand for 10-20 minutes at a time before discomfort sets in  She is not taking any pain medication on a long-term basis  Review of Systems    Patient's review of systems is significant really for just the severe back pain and spasm  Her gait is very slow and deliberate  She denies pain in her chest, shortness of breath, fever chills nausea vomiting  She denies syncope or presyncope  She has no red flag symptoms for acute back pain as mentioned above  She has never used IV drugs  She quit smoking in 1998   She actually is a fairly new patient to our practice as she has only had 1 visit since moving here from [address] [facility]  She states she is trying to lose weight following a formal diet but has not noticed any significant weight loss to this point  She denies peripheral edema  She denies any rash on her body  She has not had any back surgery in the past     Objective:     Physical Exam  well-developed well-nourished 68-year-old female who is in no obvious distress  She is teary-eyed during the exam and states that she is in a great deal of discomfort at this time  Patient's heart was regular rate without murmur rub or gallops  Lungs are clear to auscultation  Her sugar was removed in its entirety for examination of the back  She has increased muscle tone in both left and right trapezius areas  She has significant muscle spasm in the lower lumbar spine area left side and more spasms in the right side  She has no pain on vertebral body palpation from the cervical area all the way to the sacral area  Her abdomen was soft with positive bowel sounds  Lower extremities showed adequate muscle tone and strength  DTRs are +2 bilaterally  She was able to recognize light touch  Her gait with the assistance of a cane was very slow and deliberate and slightly antalgic based on her not wanting to move to make spasm worse  She was significantly more comfortable standing and leaning on it back of a chair rather than sitting

## 2022-04-28 ENCOUNTER — EVALUATION (OUTPATIENT)
Dept: PHYSICAL THERAPY | Facility: CLINIC | Age: 67
End: 2022-04-28
Payer: COMMERCIAL

## 2022-04-28 DIAGNOSIS — M54.50 LOW BACK PAIN ASSOCIATED WITH A SPINAL DISORDER OTHER THAN RADICULOPATHY OR SPINAL STENOSIS: Primary | ICD-10-CM

## 2022-04-28 PROCEDURE — 97110 THERAPEUTIC EXERCISES: CPT | Performed by: PHYSICAL THERAPIST

## 2022-04-28 PROCEDURE — 97162 PT EVAL MOD COMPLEX 30 MIN: CPT | Performed by: PHYSICAL THERAPIST

## 2022-04-28 NOTE — PROGRESS NOTES
PT Evaluation     Today's date: 2022  Patient name: Sweta Palacios  : 1955  MRN: [id number]  Referring provider: Teresa Wakefield*  Dx:   Encounter Diagnosis     ICD-10-CM    1  Low back pain associated with a spinal disorder other than radiculopathy or spinal stenosis  M54 50 Ambulatory Referral to Physical Therapy               Assessment  Assessment details:Bonnie Claudeen Cahill is a pleasant 77 y o  female presenting to PT with cc of acute exacerbation of chronic low back pain  Pt  States pain began approx 4 days ago and worsened yesterday when bending forward to shred paper  Upon examination, patient was found to have objective deficits as listed below and is displaying ss consistent with paraspinal mm spasm likely 2* functional lumbar instability/DDD  Pt  Is experiencing subsequent functional deficits including pain and difficulty walking, standing, and navigating stairs  No red flags present  Pt  Was educated on role of PT to address issues and given initial treatment with HEP  Pt  Responded well to mobility education, appropriate core mm activation training, and e-stim today to reduce pain levels  Pt  Would benefit from skilled physical therapy to promote improved function and maximize activity tolerance  Symptom irritability: highUnderstanding of Dx/Px/POC: good  Goals  ST weeks  -Pt  Will demonstrate L/S flexion AROM improvement of 25%  -Pt  Will demonstrate improved core stabilizer contraction, promoting improved muscle balance  LT weeks  -Pt  Will demonstrate ability to walk 1 mile without pain, demonstrating improved functional mobility   -Pt  Will demonstrate L/S AROM WNL  -Pt  Will demonstrate I with HEP upon DC  -Pt  Will demonstrate ability to effectively contract core mm with appropriate strength to perform lifting task     Plan  Patient would benefit from: skilled physical therapy  Planned modality interventions: cryotherapy, high voltage pulsed current: spasm management, high voltage pulsed current: pain management, thermotherapy: hydrocollator packs and unattended electrical stimulation  Planned therapy interventions: abdominal trunk stabilization, cognitive skills, functional ROM exercises, home exercise program, therapeutic training, therapeutic exercise, therapeutic activities, stretching, strengthening, postural training, neuromuscular re-education, motor coordination training, manual therapy, joint mobilization and massage  Frequency: 2x week  Duration in weeks: 6  Plan of Care beginning date:22   Plan of Care expiration date: 22  Treatment plan discussed with: patient         Subjective Evaluation     History of Present Illness  Mechanism of injury: Kvng Metcalf presents to PT with cc of significant low back pain/tension for past 4 days  Pt  Notes having yard sale Saturday that caused significant tension along belt line and paraspinal mm  She spent  on couch and felt a bit better  She notes Wednesday she reached for paper shredder and immeidately had return of symptoms  She does note some minor L calf pain and tightness but denies any weakness, bowel/bladder changes, saddle paresthesia  Pt  Visited PCP yesterday and was given mm relaxer and referred to PT  She notes pain is primarily along belt line and radiates into paraspinal mm and left flank       Pain  Current pain ratin  At best pain ratin  At worst pain rating: 10  Location:  paraspinals, L flank, L calf  Quality: tight, sharp, pressure   Relieving factors: rest, ice, heat and medications  Aggravating factors: walking, standing, sitting, stair climbing, lifting and running  Progression: worsening     Social Support  Steps to enter house: yes  Stairs in house: yes      Employment status: retired  Treatments  Previous Treatment: chiro  Current Treatment: mm relaxer        Objective      Concurrent Complaints  Negative for night pain, disturbed sleep, bladder dysfunction, bowel dysfunction and saddle (S4) numbness      Postural Observations  Seated posture: fair  Standing posture: fair           Palpation   Left   Tenderness of the erector spinae  Right   Tenderness of the erector spinae and quadratus lumborum  Tenderness      Lumbar Spine  Tenderness in the spinous process  Left Hip   Tenderness in the PSIS        Neurological Testing      Sensation      Lumbar   Left   Intact: light touch     Right   Intact: light touch     Reflexes   Left   Patellar (L4): brisk (2+)     Right   Patellar (L4): brisk (2+)     Active Range of Motion      Lumbar   Flexion:  with pain Restriction level: moderate  Extension:  with pain restriction level: severe  Left lateral flexion:  with pain Restriction level: moderate  Right lateral flexion:  with pain Restriction level: minimal  Left rotation:  WFL  Right rotation:  with pain with restriction: severe     Strength/Myotome Testing      Lumbar   Left   Normal strength     Right   Normal strength     Tests      Lumbar      Left   Negative crossed SLR  Positive Passive SLR     Right   Negative crossed SLR   Positive Passive SLR    Repeated Motions:  No centralization or peripheralization with repeated motions     General Comments:     Lower quarter screen   Hips: unremarkable  Knees: unremarkable  Foot/ankle: unremarkable    Precautions: None     Daily Treatment Diary:      Initial Evaluation Date: 04/28/22  Compliance 4/28                     Visit Number 1                    Re-Eval  IE                 MC   Foto Captured Y                           4/28                     Manual                      L/s stm -                     L/s mobz -                                           Ther-Ex                      bike -                     ltr 10x10"                     sktc 10x10"                     Hamstring stretch -                     Prone on elbows - Neuro Re-Ed                      Ppt c tra c gluteal iso 10x                                                                         Ther-Act                                                               Modalities                      IFC c P 15'

## 2022-05-09 ENCOUNTER — OFFICE VISIT (OUTPATIENT)
Dept: FAMILY MEDICINE CLINIC | Facility: CLINIC | Age: 67
End: 2022-05-09
Payer: COMMERCIAL

## 2022-05-09 ENCOUNTER — APPOINTMENT (OUTPATIENT)
Dept: RADIOLOGY | Facility: CLINIC | Age: 67
End: 2022-05-09
Payer: COMMERCIAL

## 2022-05-09 VITALS
HEIGHT: 62 IN | WEIGHT: 195 LBS | TEMPERATURE: 97.8 F | OXYGEN SATURATION: 100 % | HEART RATE: 97 BPM | DIASTOLIC BLOOD PRESSURE: 78 MMHG | BODY MASS INDEX: 35.88 KG/M2 | SYSTOLIC BLOOD PRESSURE: 120 MMHG

## 2022-05-09 DIAGNOSIS — M54.41 ACUTE BILATERAL LOW BACK PAIN WITH BILATERAL SCIATICA: Primary | ICD-10-CM

## 2022-05-09 DIAGNOSIS — M54.42 ACUTE BILATERAL LOW BACK PAIN WITH BILATERAL SCIATICA: ICD-10-CM

## 2022-05-09 DIAGNOSIS — R22.32 SKIN LUMP OF ARM, LEFT: ICD-10-CM

## 2022-05-09 DIAGNOSIS — H93.8X2 EAR FULLNESS, LEFT: ICD-10-CM

## 2022-05-09 DIAGNOSIS — M54.41 ACUTE BILATERAL LOW BACK PAIN WITH BILATERAL SCIATICA: ICD-10-CM

## 2022-05-09 DIAGNOSIS — M62.838 MUSCLE SPASM: ICD-10-CM

## 2022-05-09 DIAGNOSIS — M54.42 ACUTE BILATERAL LOW BACK PAIN WITH BILATERAL SCIATICA: Primary | ICD-10-CM

## 2022-05-09 PROCEDURE — 99214 OFFICE O/P EST MOD 30 MIN: CPT | Performed by: NURSE PRACTITIONER

## 2022-05-09 PROCEDURE — 72110 X-RAY EXAM L-2 SPINE 4/>VWS: CPT

## 2022-05-09 PROCEDURE — 72072 X-RAY EXAM THORAC SPINE 3VWS: CPT

## 2022-05-09 PROCEDURE — 72220 X-RAY EXAM SACRUM TAILBONE: CPT

## 2022-05-09 PROCEDURE — 1160F RVW MEDS BY RX/DR IN RCRD: CPT | Performed by: NURSE PRACTITIONER

## 2022-05-09 RX ORDER — METHYLPREDNISOLONE 4 MG/1
TABLET ORAL
Qty: 21 EACH | Refills: 0 | Status: SHIPPED | OUTPATIENT
Start: 2022-05-09 | End: 2022-05-19

## 2022-05-09 NOTE — PROGRESS NOTES
Assessment/Plan:       Diagnoses and all orders for this visit:    Acute bilateral low back pain with bilateral sciatica  -     XR spine lumbar minimum 4 views non injury; Future  -     XR spine thoracic 3 vw; Future  -     XR sacrum and coccyx; Future  -     methylPREDNISolone 4 MG tablet therapy pack; Use as directed on package    Muscle spasm  -     XR spine lumbar minimum 4 views non injury; Future  -     XR spine thoracic 3 vw; Future  -     XR sacrum and coccyx; Future  -     methylPREDNISolone 4 MG tablet therapy pack; Use as directed on package    Ear fullness, left  -     methylPREDNISolone 4 MG tablet therapy pack; Use as directed on package    Skin lump of arm, left  -     US extremity soft tissue; Future      Will have her try a steroid pack to help decrease the inflammation that has been ongoing in her lower to mid back area  Structural imaging also ordered  Some fluid noted in left ear - discussed how the steroid should help with this also  Subjective:      Patient ID: Rose Marie Riley is a 77 y o  female  Here today for ongoing low back pain  Identifiable incident over a week ago  Was given a muscle relaxant and to start physical therapy but reports ongoing pain that has not resolved  Pain is now also going into her b/l pelvic region and radiating to the front  Also reports left ear fullness  Back Pain    Ear Fullness         The following portions of the patient's history were reviewed and updated as appropriate: allergies, current medications, past family history, past medical history, past social history, past surgical history and problem list     Review of Systems   Musculoskeletal: Positive for back pain  All other systems reviewed and are negative          Objective:      /78 (BP Location: Left arm, Patient Position: Sitting, Cuff Size: Standard)   Pulse 97   Temp 97 8 °F (36 6 °C)   Ht 5' 2" (1 575 m)   Wt 88 5 kg (195 lb)   SpO2 100%   BMI 35 67 kg/m² Physical Exam  Vitals reviewed  Constitutional:       Appearance: Normal appearance  HENT:      Right Ear: Tympanic membrane normal       Left Ear: A middle ear effusion is present  Pulmonary:      Effort: Pulmonary effort is normal    Musculoskeletal:      Thoracic back: Spasms present  Decreased range of motion  Lumbar back: Spasms present  Decreased range of motion  Back:    Neurological:      Mental Status: She is alert and oriented to person, place, and time

## 2022-05-12 ENCOUNTER — HOSPITAL ENCOUNTER (OUTPATIENT)
Dept: ULTRASOUND IMAGING | Facility: HOSPITAL | Age: 67
Discharge: HOME | End: 2022-05-12
Payer: COMMERCIAL

## 2022-05-12 DIAGNOSIS — R22.32 SKIN LUMP OF ARM, LEFT: ICD-10-CM

## 2022-05-12 PROCEDURE — 76882 US LMTD JT/FCL EVL NVASC XTR: CPT

## 2022-05-23 ENCOUNTER — OFFICE VISIT (OUTPATIENT)
Dept: FAMILY MEDICINE CLINIC | Facility: CLINIC | Age: 67
End: 2022-05-23
Payer: COMMERCIAL

## 2022-05-23 VITALS
HEIGHT: 62 IN | SYSTOLIC BLOOD PRESSURE: 120 MMHG | OXYGEN SATURATION: 97 % | TEMPERATURE: 97.3 F | WEIGHT: 195.4 LBS | HEART RATE: 81 BPM | BODY MASS INDEX: 35.96 KG/M2 | DIASTOLIC BLOOD PRESSURE: 82 MMHG

## 2022-05-23 DIAGNOSIS — Z76.89 ENCOUNTER FOR WEIGHT MANAGEMENT: ICD-10-CM

## 2022-05-23 DIAGNOSIS — M47.26 OSTEOARTHRITIS OF SPINE WITH RADICULOPATHY, LUMBAR REGION: ICD-10-CM

## 2022-05-23 DIAGNOSIS — R22.32 SKIN LUMP OF ARM, LEFT: ICD-10-CM

## 2022-05-23 DIAGNOSIS — D17.22 LIPOMA OF LEFT UPPER EXTREMITY: ICD-10-CM

## 2022-05-23 PROCEDURE — 1036F TOBACCO NON-USER: CPT | Performed by: NURSE PRACTITIONER

## 2022-05-23 PROCEDURE — 3008F BODY MASS INDEX DOCD: CPT | Performed by: NURSE PRACTITIONER

## 2022-05-23 PROCEDURE — 1160F RVW MEDS BY RX/DR IN RCRD: CPT | Performed by: NURSE PRACTITIONER

## 2022-05-23 PROCEDURE — 99214 OFFICE O/P EST MOD 30 MIN: CPT | Performed by: NURSE PRACTITIONER

## 2022-05-23 RX ORDER — PHENTERMINE HYDROCHLORIDE 15 MG/1
15 CAPSULE ORAL EVERY MORNING
Qty: 30 CAPSULE | Refills: 0 | Status: SHIPPED | OUTPATIENT
Start: 2022-05-23 | End: 2022-06-22 | Stop reason: SDUPTHER

## 2022-05-23 NOTE — PROGRESS NOTES
Assessment/Plan:       Diagnoses and all orders for this visit:    BMI 35 0-35 9,adult  -     phentermine 15 MG capsule; Take 1 capsule (15 mg total) by mouth every morning    Encounter for weight management  -     phentermine 15 MG capsule; Take 1 capsule (15 mg total) by mouth every morning    Skin lump of arm, left    Lipoma of left upper extremity  -     Ambulatory Referral to General Surgery; Future    Osteoarthritis of spine with radiculopathy, lumbar region          Referral placed for general surgery to discuss her lipoma of her left arm  Discussed low back issues - reviewed xray results which include degenerative changes and how that will relay to her ongoing pain issues  Low back exercises provided via Care Notes system  Reviewed options for weight loss - discussed phentermine and the available option in relation to our office  Aware of gradual weight loss of 1-2 pounds a week  Reinforced need for eating healthy and cardiovascular exercise  Subjective:      Patient ID: Rush Barbour is a 79 y o  female  Here today for a follow-up - concerned about weight gain and would like to discuss pharmacological options  Would like to review back xray results  Would like referral to gen surgery to discuss removal of lipoma of left arm  The following portions of the patient's history were reviewed and updated as appropriate: allergies, current medications, past family history, past medical history, past social history, past surgical history and problem list     Review of Systems   Constitutional: Negative  HENT: Negative  Respiratory: Negative  Cardiovascular: Negative  Gastrointestinal: Negative  Musculoskeletal: Positive for back pain (See HPI)  Neurological: Negative  All other systems reviewed and are negative          Objective:      /82 (BP Location: Left arm, Patient Position: Sitting, Cuff Size: Standard)   Pulse 81   Temp (!) 97 3 °F (36 3 °C)   Ht 5' 2" (1 575 m)   Wt 88 6 kg (195 lb 6 4 oz)   SpO2 97%   BMI 35 74 kg/m²          Physical Exam  Pulmonary:      Effort: Pulmonary effort is normal    Neurological:      Mental Status: She is alert and oriented to person, place, and time

## 2022-06-01 ENCOUNTER — ANESTHESIA EVENT (OUTPATIENT)
Dept: PERIOP | Facility: HOSPITAL | Age: 67
End: 2022-06-01
Payer: COMMERCIAL

## 2022-06-01 RX ORDER — PYRITHIONE ZINC 10 MG/ML
2 LOTION/SHAMPOO TOPICAL
COMMUNITY

## 2022-06-01 RX ORDER — ACETAMINOPHEN 500 MG
1000 TABLET ORAL EVERY 6 HOURS PRN
COMMUNITY

## 2022-06-01 NOTE — PRE-PROCEDURE INSTRUCTIONS
Pre-Surgery Instructions:   Medication Instructions    acetaminophen (TYLENOL) 500 mg tablet Uses PRN- OK to take day of surgery    phentermine 15 MG capsule Pt had last dose on 6/1/22    Sennosides (Laxative Pills) 25 MG TABS Hold day of surgery     Pt will not be taking any medications the am of surgery  Pt was instructed to not take aspirin, NSAIDS, vitamins or supplements until after procedure

## 2022-06-03 ENCOUNTER — HOSPITAL ENCOUNTER (OUTPATIENT)
Facility: HOSPITAL | Age: 67
Setting detail: OUTPATIENT SURGERY
Discharge: HOME | End: 2022-06-03
Attending: SURGERY | Admitting: SURGERY
Payer: COMMERCIAL

## 2022-06-03 ENCOUNTER — ANESTHESIA (OUTPATIENT)
Dept: PERIOP | Facility: HOSPITAL | Age: 67
End: 2022-06-03
Payer: COMMERCIAL

## 2022-06-03 VITALS
HEIGHT: 62 IN | DIASTOLIC BLOOD PRESSURE: 76 MMHG | OXYGEN SATURATION: 96 % | RESPIRATION RATE: 20 BRPM | HEART RATE: 71 BPM | BODY MASS INDEX: 35.88 KG/M2 | TEMPERATURE: 97.9 F | SYSTOLIC BLOOD PRESSURE: 126 MMHG | WEIGHT: 195 LBS

## 2022-06-03 DIAGNOSIS — D17.22 BENIGN LIPOMATOUS NEOPLASM OF SKIN AND SUBCUTANEOUS TISSUE OF LEFT ARM: ICD-10-CM

## 2022-06-03 LAB
GLUCOSE SERPL-MCNC: 178 MG/DL (ref 65–140)
GLUCOSE SERPL-MCNC: 232 MG/DL (ref 65–140)

## 2022-06-03 PROCEDURE — 88307 TISSUE EXAM BY PATHOLOGIST: CPT | Performed by: PATHOLOGY

## 2022-06-03 PROCEDURE — 82948 REAGENT STRIP/BLOOD GLUCOSE: CPT

## 2022-06-03 PROCEDURE — 11406 EXC TR-EXT B9+MARG >4.0 CM: CPT

## 2022-06-03 RX ORDER — SODIUM CHLORIDE, SODIUM LACTATE, POTASSIUM CHLORIDE, CALCIUM CHLORIDE 600; 310; 30; 20 MG/100ML; MG/100ML; MG/100ML; MG/100ML
INJECTION, SOLUTION INTRAVENOUS CONTINUOUS PRN
Status: DISCONTINUED | OUTPATIENT
Start: 2022-06-03 | End: 2022-06-03

## 2022-06-03 RX ORDER — CEFAZOLIN SODIUM 2 G/50ML
2000 SOLUTION INTRAVENOUS ONCE
Status: COMPLETED | OUTPATIENT
Start: 2022-06-03 | End: 2022-06-03

## 2022-06-03 RX ORDER — MIDAZOLAM HYDROCHLORIDE 2 MG/2ML
INJECTION, SOLUTION INTRAMUSCULAR; INTRAVENOUS AS NEEDED
Status: DISCONTINUED | OUTPATIENT
Start: 2022-06-03 | End: 2022-06-03

## 2022-06-03 RX ORDER — FENTANYL CITRATE/PF 50 MCG/ML
25 SYRINGE (ML) INJECTION
Status: DISCONTINUED | OUTPATIENT
Start: 2022-06-03 | End: 2022-06-03 | Stop reason: HOSPADM

## 2022-06-03 RX ORDER — ONDANSETRON 2 MG/ML
4 INJECTION INTRAMUSCULAR; INTRAVENOUS ONCE AS NEEDED
Status: DISCONTINUED | OUTPATIENT
Start: 2022-06-03 | End: 2022-06-03 | Stop reason: HOSPADM

## 2022-06-03 RX ORDER — HYDROMORPHONE HCL IN WATER/PF 6 MG/30 ML
0.2 PATIENT CONTROLLED ANALGESIA SYRINGE INTRAVENOUS
Status: DISCONTINUED | OUTPATIENT
Start: 2022-06-03 | End: 2022-06-03 | Stop reason: HOSPADM

## 2022-06-03 RX ORDER — LIDOCAINE HYDROCHLORIDE 10 MG/ML
INJECTION, SOLUTION EPIDURAL; INFILTRATION; INTRACAUDAL; PERINEURAL AS NEEDED
Status: DISCONTINUED | OUTPATIENT
Start: 2022-06-03 | End: 2022-06-03

## 2022-06-03 RX ORDER — KETOROLAC TROMETHAMINE 30 MG/ML
INJECTION, SOLUTION INTRAMUSCULAR; INTRAVENOUS AS NEEDED
Status: DISCONTINUED | OUTPATIENT
Start: 2022-06-03 | End: 2022-06-03

## 2022-06-03 RX ORDER — SODIUM CHLORIDE, SODIUM LACTATE, POTASSIUM CHLORIDE, CALCIUM CHLORIDE 600; 310; 30; 20 MG/100ML; MG/100ML; MG/100ML; MG/100ML
20 INJECTION, SOLUTION INTRAVENOUS CONTINUOUS
Status: DISCONTINUED | OUTPATIENT
Start: 2022-06-03 | End: 2022-06-03 | Stop reason: HOSPADM

## 2022-06-03 RX ORDER — LIDOCAINE HYDROCHLORIDE AND EPINEPHRINE 10; 10 MG/ML; UG/ML
INJECTION, SOLUTION INFILTRATION; PERINEURAL AS NEEDED
Status: DISCONTINUED | OUTPATIENT
Start: 2022-06-03 | End: 2022-06-03 | Stop reason: HOSPADM

## 2022-06-03 RX ORDER — MAGNESIUM HYDROXIDE 1200 MG/15ML
LIQUID ORAL AS NEEDED
Status: DISCONTINUED | OUTPATIENT
Start: 2022-06-03 | End: 2022-06-03 | Stop reason: HOSPADM

## 2022-06-03 RX ORDER — PROPOFOL 10 MG/ML
INJECTION, EMULSION INTRAVENOUS CONTINUOUS PRN
Status: DISCONTINUED | OUTPATIENT
Start: 2022-06-03 | End: 2022-06-03

## 2022-06-03 RX ORDER — FENTANYL CITRATE 50 UG/ML
INJECTION, SOLUTION INTRAMUSCULAR; INTRAVENOUS AS NEEDED
Status: DISCONTINUED | OUTPATIENT
Start: 2022-06-03 | End: 2022-06-03

## 2022-06-03 RX ADMIN — PROPOFOL 150 MCG/KG/MIN: 10 INJECTION, EMULSION INTRAVENOUS at 10:04

## 2022-06-03 RX ADMIN — SODIUM CHLORIDE, SODIUM LACTATE, POTASSIUM CHLORIDE, AND CALCIUM CHLORIDE: .6; .31; .03; .02 INJECTION, SOLUTION INTRAVENOUS at 09:58

## 2022-06-03 RX ADMIN — INSULIN HUMAN 6 UNITS: 100 INJECTION, SOLUTION PARENTERAL at 08:58

## 2022-06-03 RX ADMIN — LIDOCAINE HYDROCHLORIDE 50 MG: 10 INJECTION, SOLUTION EPIDURAL; INFILTRATION; INTRACAUDAL at 10:04

## 2022-06-03 RX ADMIN — CEFAZOLIN SODIUM 2000 MG: 2 SOLUTION INTRAVENOUS at 09:56

## 2022-06-03 RX ADMIN — MIDAZOLAM 2 MG: 1 INJECTION INTRAMUSCULAR; INTRAVENOUS at 09:56

## 2022-06-03 RX ADMIN — FENTANYL CITRATE 25 MCG: 50 INJECTION INTRAMUSCULAR; INTRAVENOUS at 10:04

## 2022-06-03 RX ADMIN — KETOROLAC TROMETHAMINE 30 MG: 30 INJECTION, SOLUTION INTRAMUSCULAR at 10:36

## 2022-06-03 NOTE — DISCHARGE SUMMARY
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 [D17 22]    HPI:  Patient has mass left upper arm that increased size and comfortable  She presents for removal     Procedures Performed: No orders of the defined types were placed in this encounter  Summary of Hospital Course: The patient underwent excision mass left upper arm on 06/03  She will be discharged on 06/03 without difficulty  Significant Findings, Care, Treatment and Services Provided: Mass left upper arm consistent with lipoma  Excision of lipoma on 06/03  Complications:  None    Discharge Diagnosis:  Mass left upper arm    Medical Problems             Resolved Problems  Date Reviewed: 5/23/2022   None                 Condition at Discharge: good         Discharge instructions/Information to patient and family:   See after visit summary for information provided to patient and family  Provisions for Follow-Up Care:  See after visit summary for information related to follow-up care and any pertinent home health orders  PCP: MARK Witt    Disposition: Home    Planned Readmission: No      Discharge Statement   I spent 15 minutes discharging the patient  This time was spent on the day of discharge  I had direct contact with the patient on the day of discharge  Additional documentation is required if more than 30 minutes were spent on discharge  Discharge Medications:  See after visit summary for reconciled discharge medications provided to patient and family

## 2022-06-03 NOTE — DISCHARGE INSTRUCTIONS
Remove dressing in 48 hours  May shower after dressing is removed  Cover with gauze daily  Take Tylenol Motrin Aleve or Advil as needed with food    Call if any redness or drainage

## 2022-06-03 NOTE — INTERVAL H&P NOTE
H&P reviewed  After examining the patient I find no changes in the patients condition since the H&P had been written      Vitals:    06/03/22 0807   BP: 139/84   Pulse: 82   Resp: 18   Temp: 97 9 °F (36 6 °C)   SpO2: 93%

## 2022-06-03 NOTE — ANESTHESIA PREPROCEDURE EVALUATION
Procedure:  UPPER ARM MASS EXCISION (Left Arm Upper)    Relevant Problems   GI/HEPATIC   (+) Sphincter of Oddi dysfunction      MUSCULOSKELETAL   (+) Arthritis   (+) Sphincter of Oddi dysfunction      CAD/PCI/MI/CHF -- denies  COPD/ASTHMA/TACO -- denies  PROBS WITH PRIOR ANESTHESIA -- denies  NPO STATUS CONFIRMED    Lab Results   Component Value Date    SODIUM 137 03/25/2022    K 4 1 03/25/2022    BUN 22 03/25/2022    CREATININE 1 09 03/25/2022    EGFR 53 03/25/2022     Lab Results   Component Value Date    HGBA1C 7 6 (H) 03/25/2022       No results found for: HGB, PLT  No results found for: WBC    Lab Results   Component Value Date    CREATININE 1 09 03/25/2022       No results found for: INR  No results found for: PTT    No results found for: LACTATE                        Physical Exam    Airway    Mallampati score: II  TM Distance: >3 FB       Dental   lower dentures and upper dentures,     Cardiovascular      Pulmonary      Other Findings        Anesthesia Plan  ASA Score- 2     Anesthesia Type- IV sedation with anesthesia with ASA Monitors  Additional Monitors:   Airway Plan:     Comment: ETELVINA Jimenez , have personally seen and evaluated the patient prior to anesthetic care  I have reviewed the pre-anesthetic record, and other medical records if appropriate to the anesthetic care  If a CRNA is involved in the case, I have reviewed the CRNA assessment, if present, and agree  Risks/benefits and alternatives discussed with patient including possible PONV, sore throat, and possibility of rare anesthetic and surgical emergencies          Plan Factors-    Chart reviewed  EKG reviewed  Imaging results reviewed  Existing labs reviewed  Patient summary reviewed  Patient instructed to abstain from smoking on day of procedure  Obstructive sleep apnea risk education given perioperatively      Induction-     Postoperative Plan-     Informed Consent- Anesthetic plan and risks discussed with patient  I personally reviewed this patient with the CRNA  Discussed and agreed on the Anesthesia Plan with the CRNA  Cade Naranjo

## 2022-06-03 NOTE — OP NOTE
OPERATIVE REPORT  PATIENT NAME: Adriana Kussmaul    :  1955  MRN: [id number]  Pt Location: [facility] OR ROOM 01    SURGERY DATE: 6/3/2022    Surgeon(s) and Role:     * Krys Mendiola DO - Primary     * Mery Dias PA-C - Assisting    Preop Diagnosis:  Benign lipomatous neoplasm of skin and subcutaneous tissue of left arm [D17 22]    Post-Op Diagnosis Codes: * Benign lipomatous neoplasm of skin and subcutaneous tissue of left arm [D17 22]    Procedure(s) (LRB):  UPPER ARM MASS EXCISION (Left)    Specimen(s):  ID Type Source Tests Collected by Time Destination   1 : left upper arm mass Tissue Soft Tissue, Other TISSUE EXAM Krys Mendiola DO 6/3/2022 10:30 AM        Estimated Blood Loss:   Minimal    Drains:  * No LDAs found *    Anesthesia Type:   IV Sedation with Anesthesia    Operative Indications:  Benign lipomatous neoplasm of skin and subcutaneous tissue of left arm [D17 22]      Operative Findings: There is a lipoma of the left upper arm measuring 6 x 5 x 1 cm  It extends down to the fascia  No other abnormalities are noted  Appears to be a lipoma  Complications:   None    Procedure and Technique:  The patient is identified preoperatively and laterality is confirmed with the patient  The patient then taken to the operating room and given IV sedation and a time-out was performed with all members of the team present  The patient's left arm was prepped and draped in a sterile manner using ChloraPrep  Local anesthetic 1% lidocaine with epinephrine was instilled over the mass and an oblique incision was made  I dissected down to the mass using sharp dissection and cautery  The mass was excised and sent for specimen  It measures 6 x 5 x 1 cm  Cautery was used for hemostasis  The area was irrigated with saline lavage  The wound was closed with interrupted 3-0 Vicryl suture and 4-0 Vicryl suture for skin  A gauze and Bioclusive dressing was applied    Patient tolerated the procedure well sponge needle sharp instrument counts were correct x2  The patient was transferred to recovery room in satisfactory and stable condition       I was present for the entire procedure and A physician assistant was required during the procedure for retraction tissue handling,dissection and suturing    Patient Disposition:  APU      SIGNATURE: Alecia Barbour DO  DATE: Judith 3, 2022  TIME: 10:39 AM

## 2022-06-03 NOTE — ANESTHESIA POSTPROCEDURE EVALUATION
Post-Op Assessment Note    CV Status:  Stable  Pain Score: 0    Pain management: adequate     Mental Status:  Alert and awake   Hydration Status:  Stable   PONV Controlled:  Controlled   Airway Patency:  Patent      Post Op Vitals Reviewed: Yes      Staff: CRNA         No complications documented      BP   105/55   Temp   97 9   Pulse  84   Resp   16   SpO2 92

## 2022-06-04 ENCOUNTER — OFFICE VISIT (OUTPATIENT)
Dept: URGENT CARE | Facility: CLINIC | Age: 67
End: 2022-06-04
Payer: COMMERCIAL

## 2022-06-04 VITALS
BODY MASS INDEX: 35.88 KG/M2 | DIASTOLIC BLOOD PRESSURE: 93 MMHG | WEIGHT: 195 LBS | RESPIRATION RATE: 17 BRPM | HEART RATE: 83 BPM | SYSTOLIC BLOOD PRESSURE: 176 MMHG | OXYGEN SATURATION: 99 % | HEIGHT: 62 IN | TEMPERATURE: 97.9 F

## 2022-06-04 DIAGNOSIS — S40.022A CONTUSION OF LEFT UPPER ARM, INITIAL ENCOUNTER: ICD-10-CM

## 2022-06-04 DIAGNOSIS — L23.1 CONTACT DERMATITIS DUE TO ADHESIVES, UNSPECIFIED CONTACT DERMATITIS TYPE: Primary | ICD-10-CM

## 2022-06-04 PROCEDURE — 99213 OFFICE O/P EST LOW 20 MIN: CPT | Performed by: PHYSICIAN ASSISTANT

## 2022-06-04 NOTE — PROGRESS NOTES
[facility]    NAME: Reagan Cedeno is a 79 y o  female  : 1955    MRN: [id number]  DATE: 2022  TIME: 1:10 PM    Assessment and Plan   Contact dermatitis due to adhesives, unspecified contact dermatitis type [L23 1]  1  Contact dermatitis due to adhesives, unspecified contact dermatitis type     2  Contusion of left upper arm, initial encounter       This appears to be a contact dermatitis from the adhesive bandage  No sign of infection at this time  Contusion appears appropriate for surgical procedure  Patient and spouse informed that contusion may start to gravitate down arm  Patient Instructions   Patient Instructions   Original bandage was removed  It does appear to be local reaction to either topical cleanser or bandage material   Avoid any adhesive on skin  May continue cold compresses off and on as instructed  Daily dressing change as instructed  Photos were taken on patient's 's phone to document skin changes  Contact surgeon on Monday to inform a visit to urgent care  Watch for signs of infection which would include increased pain, swelling, induration and / or purulent drainage  If you would develop any severe pain of the arm, fever and chills associated proceed to emergency room for further evaluation  Chief Complaint     Chief Complaint   Patient presents with    Wound Infection     Had surgery done yesterday on her left arm to remove mass her arm is very red and itchy the redness she said is getting worse        History of Present Illness   Reagan Cedeno presents to the clinic c/o  59-year-old female comes in with discoloration left upper arm after having lipoma removed under twice light sedation yesterday at [facility]   She noted some discoloration coming out from underneath the bandage  Some itching  No increased pain  She has been icing it off and on as instructed  Follow-up with surgeon is not until later in   No fever or chills  She does mention history of irritation with adhesive bandages in the past       Review of Systems   Review of Systems   Constitutional: Negative  Negative for chills and fever  Respiratory: Negative  Cardiovascular: Negative  Skin: Positive for color change and wound         Current Medications     Long-Term Medications   Medication Sig Dispense Refill    phentermine 15 MG capsule Take 1 capsule (15 mg total) by mouth every morning 30 capsule 0    Sennosides (Laxative Pills) 25 MG TABS Take 2 tablets by mouth daily at bedtime         Current Allergies     Allergies as of 06/04/2022 - Reviewed 06/04/2022   Allergen Reaction Noted   Tomas Holman - food allergy Shortness Of Breath 08/26/2010    Pineapple - food allergy Shortness Of Breath 11/14/2018    Demerol [meperidine] Itching 08/12/2020    Oxycodone Itching 08/12/2020    Propoxyphene Itching 08/26/2010          The following portions of the patient's history were reviewed and updated as appropriate: allergies, current medications, past family history, past medical history, past social history, past surgical history and problem list   Past Medical History:   Diagnosis Date    Allergic Several years ago    Arthritis Several years ago    Bronchitis     Elevated hemoglobin A1c     Pt states she does not take medication but is trying to lower with diet and exercise ( 7 1)    Kidney stone Several years ago    Lipoma of arm     LUE    Obesity Several years ago    Sphincter of Oddi dysfunction      Past Surgical History:   Procedure Laterality Date    BLADDER SUSPENSION      BREAST LUMPECTOMY Left 2000    benign mass    CHOLECYSTECTOMY      COLONOSCOPY      HYSTERECTOMY  1980    OOPHORECTOMY Bilateral 1980    VT EXC SKIN BENIG 3 1-4 CM TRUNK,ARM,LEG Left 6/3/2022    Procedure: UPPER ARM MASS EXCISION;  Surgeon: Tunde Day DO;  Location:  MAIN OR;  Service: General    RECTAL PROLAPSE REPAIR       Family History Problem Relation Age of Onset    No Known Problems Mother     Diabetes Father     No Known Problems Sister     No Known Problems Daughter     Leukemia Maternal Grandmother     No Known Problems Maternal Grandfather     No Known Problems Paternal Grandmother     No Known Problems Paternal Grandfather     No Known Problems Maternal Aunt     No Known Problems Maternal Aunt     No Known Problems Paternal Aunt     No Known Problems Paternal Aunt     No Known Problems Sister     No Known Problems Daughter     BRCA2 Positive Neg Hx     BRCA2 Negative Neg Hx     BRCA1 Positive Neg Hx     BRCA1 Negative Neg Hx     BRCA 1/2 Neg Hx     Ovarian cancer Neg Hx     Endometrial cancer Neg Hx     Colon cancer Neg Hx     Breast cancer additional onset Neg Hx     Breast cancer Neg Hx        Objective   BP (!) 176/93   Pulse 83   Temp 97 9 °F (36 6 °C)   Resp 17   Ht 5' 2" (1 575 m)   Wt 88 5 kg (195 lb)   SpO2 99%   BMI 35 67 kg/m²   No LMP recorded  Patient has had a hysterectomy  Physical Exam     Physical Exam  Vitals and nursing note reviewed  Constitutional:       General: She is not in acute distress  Appearance: She is well-developed  She is obese  She is not ill-appearing, toxic-appearing or diaphoretic  Comments: Bandage around left upper arm   Cardiovascular:      Rate and Rhythm: Normal rate  Pulmonary:      Effort: Pulmonary effort is normal  No respiratory distress  Skin:     General: Skin is warm and dry  Findings: Bruising and erythema present  Comments: Bandage removed  Surgical wound appears clean without drainage  Redness and contusion noted peripheral to surgical wound and not advancing from surgical wound  No induration or TTP  There are some small blisters around the bandage line and there is some desquamation of skin after removal of bandage  Neurological:      Mental Status: She is alert and oriented to person, place, and time     Psychiatric: Mood and Affect: Mood normal          Behavior: Behavior normal

## 2022-06-04 NOTE — PATIENT INSTRUCTIONS
Original bandage was removed  It does appear to be local reaction to either topical cleanser or bandage material   Avoid any adhesive on skin  May continue cold compresses off and on as instructed  Daily dressing change as instructed  Photos were taken on patient's 's phone to document skin changes  Contact surgeon on Monday to inform a visit to urgent care  Watch for signs of infection which would include increased pain, swelling, induration and / or purulent drainage  If you would develop any severe pain of the arm, fever and chills associated proceed to emergency room for further evaluation

## 2022-06-06 ENCOUNTER — TELEPHONE (OUTPATIENT)
Dept: FAMILY MEDICINE CLINIC | Facility: CLINIC | Age: 67
End: 2022-06-06

## 2022-06-06 ENCOUNTER — APPOINTMENT (OUTPATIENT)
Dept: LAB | Facility: CLINIC | Age: 67
End: 2022-06-06
Payer: COMMERCIAL

## 2022-06-06 DIAGNOSIS — R73.9 HYPERGLYCEMIA: Primary | ICD-10-CM

## 2022-06-06 DIAGNOSIS — R73.9 HYPERGLYCEMIA: ICD-10-CM

## 2022-06-06 LAB
EST. AVERAGE GLUCOSE BLD GHB EST-MCNC: 177 MG/DL
HBA1C MFR BLD: 7.8 %

## 2022-06-06 PROCEDURE — 36415 COLL VENOUS BLD VENIPUNCTURE: CPT

## 2022-06-06 PROCEDURE — 83036 HEMOGLOBIN GLYCOSYLATED A1C: CPT

## 2022-06-06 NOTE — TELEPHONE ENCOUNTER
Patient called office this morning stating she had surgery on Friday  They checked her sugar and it was 232  They had to give her insulin to bring her sugar down  In her AVS, she was instructed to call her doctor regarding this

## 2022-06-06 NOTE — TELEPHONE ENCOUNTER
The sugar may have been increased due to the surgery  Her last diabetes screening was elevated  I would recommend a repeat blood test as she would be due for a recheck for this soon - I will place a blood test for the hemoglobin A1C, she should get it drawn when able - she does NOT need to be fasting for this

## 2022-06-07 ENCOUNTER — TELEPHONE (OUTPATIENT)
Dept: FAMILY MEDICINE CLINIC | Facility: CLINIC | Age: 67
End: 2022-06-07

## 2022-06-07 NOTE — TELEPHONE ENCOUNTER
Called patient gave her information  She is questioning if she should still keep the original appointment in addition to the new one I made for her  She said the original appointment was to refill her Phentermine  And is prescription for Metformin being sent to pharmacy

## 2022-06-07 NOTE — TELEPHONE ENCOUNTER
Patient called to let us know she had her A1C done yesterday  It was 7 8, she is asking what the next step is   Please advise

## 2022-06-22 DIAGNOSIS — Z76.89 ENCOUNTER FOR WEIGHT MANAGEMENT: ICD-10-CM

## 2022-06-22 RX ORDER — PHENTERMINE HYDROCHLORIDE 15 MG/1
15 CAPSULE ORAL EVERY MORNING
Qty: 30 CAPSULE | Refills: 0 | Status: SHIPPED | OUTPATIENT
Start: 2022-06-22 | End: 2022-07-20

## 2022-07-05 ENCOUNTER — OFFICE VISIT (OUTPATIENT)
Dept: FAMILY MEDICINE CLINIC | Facility: CLINIC | Age: 67
End: 2022-07-05
Payer: COMMERCIAL

## 2022-07-05 VITALS
OXYGEN SATURATION: 96 % | BODY MASS INDEX: 35.92 KG/M2 | SYSTOLIC BLOOD PRESSURE: 130 MMHG | HEART RATE: 90 BPM | TEMPERATURE: 98 F | HEIGHT: 62 IN | DIASTOLIC BLOOD PRESSURE: 100 MMHG | WEIGHT: 195.2 LBS

## 2022-07-05 DIAGNOSIS — O16.1 ELEVATED BLOOD PRESSURE AFFECTING PREGNANCY IN FIRST TRIMESTER, ANTEPARTUM: ICD-10-CM

## 2022-07-05 DIAGNOSIS — D17.22 LIPOMA OF LEFT UPPER EXTREMITY: ICD-10-CM

## 2022-07-05 DIAGNOSIS — R73.09 ELEVATED HEMOGLOBIN A1C: ICD-10-CM

## 2022-07-05 DIAGNOSIS — M47.26 OSTEOARTHRITIS OF SPINE WITH RADICULOPATHY, LUMBAR REGION: ICD-10-CM

## 2022-07-05 DIAGNOSIS — R73.9 HYPERGLYCEMIA: Primary | ICD-10-CM

## 2022-07-05 PROCEDURE — 1160F RVW MEDS BY RX/DR IN RCRD: CPT | Performed by: NURSE PRACTITIONER

## 2022-07-05 PROCEDURE — 99213 OFFICE O/P EST LOW 20 MIN: CPT | Performed by: NURSE PRACTITIONER

## 2022-07-14 DIAGNOSIS — R73.9 HYPERGLYCEMIA: Primary | ICD-10-CM

## 2022-07-14 RX ORDER — GLIPIZIDE 5 MG/1
5 TABLET ORAL
Qty: 30 TABLET | Refills: 5 | Status: SHIPPED | OUTPATIENT
Start: 2022-07-14 | End: 2022-10-06

## 2022-07-17 ENCOUNTER — PATIENT MESSAGE (OUTPATIENT)
Dept: FAMILY MEDICINE CLINIC | Facility: CLINIC | Age: 67
End: 2022-07-17

## 2022-07-18 NOTE — TELEPHONE ENCOUNTER
Ellyn Prader, MA 7/18/2022 7:08 AM EDT      ----- Message -----  From: Jose Scanlon  Sent: 7/17/2022 2:45 PM EDT  To: [facility]  Subject: Jose Scanlon and reaction     I turns out I probably didnt have a reaction to the metformin  My mom told me she has Covid  So I took a test and YES I HAVE COVID! Was it a reaction to metformin or just Covid?

## 2022-07-20 RX ORDER — PHENTERMINE HYDROCHLORIDE 30 MG/1
30 CAPSULE ORAL EVERY MORNING
Qty: 30 CAPSULE | Refills: 0 | Status: SHIPPED | OUTPATIENT
Start: 2022-07-20 | End: 2022-08-19 | Stop reason: SDUPTHER

## 2022-08-05 ENCOUNTER — TELEPHONE (OUTPATIENT)
Dept: FAMILY MEDICINE CLINIC | Facility: CLINIC | Age: 67
End: 2022-08-05

## 2022-08-05 DIAGNOSIS — R73.9 HYPERGLYCEMIA: Primary | ICD-10-CM

## 2022-08-05 RX ORDER — METFORMIN HYDROCHLORIDE 500 MG/1
500 TABLET, EXTENDED RELEASE ORAL
Qty: 90 TABLET | Refills: 1 | Status: SHIPPED | OUTPATIENT
Start: 2022-08-05

## 2022-08-05 NOTE — TELEPHONE ENCOUNTER
Patient called stating she needs a refill on her metformin but you had cancelled the prescription  She said there was a mix up about her possibly having an allergic reaction to it but it turned out she had COVID and not an allergic reaction  Can you please send in script

## 2022-08-19 RX ORDER — PHENTERMINE HYDROCHLORIDE 30 MG/1
30 CAPSULE ORAL EVERY MORNING
Qty: 30 CAPSULE | Refills: 0 | Status: SHIPPED | OUTPATIENT
Start: 2022-08-19 | End: 2022-09-19 | Stop reason: SDUPTHER

## 2022-09-19 DIAGNOSIS — B35.1 TOENAIL FUNGUS: Primary | ICD-10-CM

## 2022-09-19 RX ORDER — CICLOPIROX OLAMINE 7.7 MG/100ML
1 SUSPENSION TOPICAL 2 TIMES DAILY
Qty: 60 ML | Refills: 1 | Status: SHIPPED | OUTPATIENT
Start: 2022-09-19 | End: 2022-09-19

## 2022-09-19 RX ORDER — PHENTERMINE HYDROCHLORIDE 30 MG/1
30 CAPSULE ORAL EVERY MORNING
Qty: 30 CAPSULE | Refills: 0 | Status: SHIPPED | OUTPATIENT
Start: 2022-09-19 | End: 2022-10-17 | Stop reason: SDUPTHER

## 2022-09-19 NOTE — TELEPHONE ENCOUNTER
Patient is also requesting Ciclopirox Topical Solution 0 77% for a toenail fungal infection  She was on this in the past by her old doctor

## 2022-10-06 ENCOUNTER — OFFICE VISIT (OUTPATIENT)
Dept: FAMILY MEDICINE CLINIC | Facility: CLINIC | Age: 67
End: 2022-10-06
Payer: COMMERCIAL

## 2022-10-06 VITALS
TEMPERATURE: 98 F | BODY MASS INDEX: 34.34 KG/M2 | SYSTOLIC BLOOD PRESSURE: 130 MMHG | WEIGHT: 186.6 LBS | OXYGEN SATURATION: 96 % | HEIGHT: 62 IN | DIASTOLIC BLOOD PRESSURE: 76 MMHG | HEART RATE: 94 BPM

## 2022-10-06 DIAGNOSIS — M47.26 OSTEOARTHRITIS OF SPINE WITH RADICULOPATHY, LUMBAR REGION: ICD-10-CM

## 2022-10-06 DIAGNOSIS — R73.09 ELEVATED HEMOGLOBIN A1C: Primary | ICD-10-CM

## 2022-10-06 DIAGNOSIS — B35.1 TOENAIL FUNGUS: ICD-10-CM

## 2022-10-06 DIAGNOSIS — Z23 NEEDS FLU SHOT: ICD-10-CM

## 2022-10-06 LAB — SL AMB POCT HEMOGLOBIN AIC: 6.8 (ref ?–6.5)

## 2022-10-06 PROCEDURE — 83036 HEMOGLOBIN GLYCOSYLATED A1C: CPT | Performed by: NURSE PRACTITIONER

## 2022-10-06 PROCEDURE — 99213 OFFICE O/P EST LOW 20 MIN: CPT | Performed by: NURSE PRACTITIONER

## 2022-10-06 RX ORDER — METHYLPREDNISOLONE 4 MG/1
TABLET ORAL
COMMUNITY
Start: 2022-08-01 | End: 2022-10-06

## 2022-10-06 NOTE — PROGRESS NOTES
Name: Crystal Stoll      : [birth year]      MRN: [id number]  Encounter Provider: MARK Gomez  Encounter Date: 10/6/2022   Encounter department: [address] [place] [address] PRIMARY CARE    Assessment & Plan     1  Elevated hemoglobin A1c  -     POCT hemoglobin A1c    2  BMI 30 0-30 9,adult    3  Toenail fungus  Comments:  Improved with cream    4  Needs flu shot  -     influenza vaccine, high-dose, PF 0 7 mL (FLUZONE HIGH-DOSE)    5  Osteoarthritis of spine with radiculopathy, lumbar region  Comments:  Notes she "tweaked" it yesterday but that it is minimal and will improve  Her HA1C has decreased considerably - will have her continue with Metformin and also with phentermine at this time - suspect weight loss played a contribution in blood sugar reduction  BMI Counseling: Body mass index is 34 13 kg/m²  The BMI is above normal  Nutrition recommendations include encouraging healthy choices of fruits and vegetables  Exercise recommendations include moderate physical activity 150 minutes/week  Rationale for BMI follow-up plan is due to patient being overweight or obese  Depression Screening and Follow-up Plan: Patient was screened for depression during today's encounter  They screened negative with a PHQ-2 score of 0  Subjective      Here today for a follow-up  Hx of elevated HA1C, on metformin  More recently started Phentermine for weight loss - notes decrease in appetite and also is not hungry for sweets at this time  Denies any major issues  States she "tweaked" her low back yesterday but it is improving independently  Review of Systems   Constitutional: Negative  HENT: Negative  Respiratory: Negative  Cardiovascular: Negative  Gastrointestinal: Negative  Musculoskeletal: Positive for back pain  Neurological: Negative  All other systems reviewed and are negative        Current Outpatient Medications on File Prior to Visit   Medication Sig    acetaminophen (TYLENOL) 500 mg tablet Take 1,000 mg by mouth every 6 (six) hours as needed for mild pain (Pt reports that she may take 3 at a time)    ciclopirox (LOPROX) 0 77 % cream Apply topically 2 (two) times a day    metFORMIN (GLUCOPHAGE-XR) 500 mg 24 hr tablet Take 1 tablet (500 mg total) by mouth daily with breakfast    phentermine 30 MG capsule Take 1 capsule (30 mg total) by mouth every morning    Sennosides (Laxative Pills) 25 MG TABS Take 2 tablets by mouth daily at bedtime    [DISCONTINUED] methylprednisolone (MEDROL) 4 mg tablet Take by mouth    [DISCONTINUED] glipiZIDE (GLUCOTROL) 5 mg tablet Take 1 tablet (5 mg total) by mouth daily with breakfast       Objective     /76 (BP Location: Left arm, Patient Position: Sitting)   Pulse 94   Temp 98 °F (36 7 °C)   Ht 5' 2" (1 575 m)   Wt 84 6 kg (186 lb 9 6 oz)   SpO2 96%   BMI 34 13 kg/m²     Physical Exam  Constitutional:       Appearance: Normal appearance  Cardiovascular:      Rate and Rhythm: Normal rate and regular rhythm  Pulmonary:      Effort: Pulmonary effort is normal       Breath sounds: Normal breath sounds  Neurological:      Mental Status: She is alert  Psychiatric:         Mood and Affect: Mood normal          Behavior: Behavior normal          Thought Content:  Thought content normal          Judgment: Judgment normal        MARK Wells

## 2022-10-17 RX ORDER — PHENTERMINE HYDROCHLORIDE 30 MG/1
30 CAPSULE ORAL EVERY MORNING
Qty: 30 CAPSULE | Refills: 0 | Status: SHIPPED | OUTPATIENT
Start: 2022-10-17

## 2022-11-18 RX ORDER — PHENTERMINE HYDROCHLORIDE 30 MG/1
30 CAPSULE ORAL EVERY MORNING
Qty: 30 CAPSULE | Refills: 0 | Status: SHIPPED | OUTPATIENT
Start: 2022-11-18

## 2022-12-16 RX ORDER — PHENTERMINE HYDROCHLORIDE 30 MG/1
30 CAPSULE ORAL EVERY MORNING
Qty: 30 CAPSULE | Refills: 0 | Status: SHIPPED | OUTPATIENT
Start: 2022-12-16

## 2022-12-19 ENCOUNTER — TELEPHONE (OUTPATIENT)
Dept: FAMILY MEDICINE CLINIC | Facility: CLINIC | Age: 67
End: 2022-12-19

## 2022-12-19 NOTE — TELEPHONE ENCOUNTER
Insurance denied this medication coverage due to patient being on it for more than 3 months  Patients plan only covers first 3 months of use

## 2022-12-19 NOTE — TELEPHONE ENCOUNTER
Prior auth for phentermine 30 mg has been submitted to [email or web address]  Waiting for determination

## 2023-01-04 ENCOUNTER — APPOINTMENT (EMERGENCY)
Dept: RADIOLOGY | Facility: HOSPITAL | Age: 68
End: 2023-01-04

## 2023-01-04 ENCOUNTER — HOSPITAL ENCOUNTER (EMERGENCY)
Facility: HOSPITAL | Age: 68
Discharge: HOME | End: 2023-01-04
Attending: STUDENT IN AN ORGANIZED HEALTH CARE EDUCATION/TRAINING PROGRAM

## 2023-01-04 VITALS
BODY MASS INDEX: 32.92 KG/M2 | HEART RATE: 92 BPM | WEIGHT: 180 LBS | RESPIRATION RATE: 22 BRPM | DIASTOLIC BLOOD PRESSURE: 99 MMHG | OXYGEN SATURATION: 95 % | SYSTOLIC BLOOD PRESSURE: 161 MMHG | TEMPERATURE: 97.2 F

## 2023-01-04 DIAGNOSIS — J06.9 VIRAL URI WITH COUGH: Primary | ICD-10-CM

## 2023-01-04 DIAGNOSIS — J02.9 PHARYNGITIS, UNSPECIFIED ETIOLOGY: ICD-10-CM

## 2023-01-04 LAB
FLUAV RNA RESP QL NAA+PROBE: NEGATIVE
FLUBV RNA RESP QL NAA+PROBE: NEGATIVE
RSV RNA RESP QL NAA+PROBE: NEGATIVE
SARS-COV-2 RNA RESP QL NAA+PROBE: NEGATIVE

## 2023-01-04 RX ORDER — GUAIFENESIN/DEXTROMETHORPHAN 100-10MG/5
10 SYRUP ORAL EVERY 4 HOURS PRN
Qty: 473 ML | Refills: 0 | Status: SHIPPED | OUTPATIENT
Start: 2023-01-04

## 2023-01-04 RX ORDER — GUAIFENESIN/DEXTROMETHORPHAN 100-10MG/5
10 SYRUP ORAL ONCE
Status: COMPLETED | OUTPATIENT
Start: 2023-01-04 | End: 2023-01-04

## 2023-01-04 RX ADMIN — GUAIFENESIN AND DEXTROMETHORPHAN 10 ML: 100; 10 SYRUP ORAL at 03:37

## 2023-01-04 RX ADMIN — DEXAMETHASONE SODIUM PHOSPHATE 10 MG: 10 INJECTION, SOLUTION INTRAMUSCULAR; INTRAVENOUS at 03:38

## 2023-01-04 NOTE — ED PROVIDER NOTES
History  Chief Complaint   Patient presents with   • Shortness of Breath     Started with a sinus infection 3 days ago and now has an increase in SOB  Has been using OTC medications to manage symptoms with no relief  History provided by:  Patient  Shortness of Breath  Severity:  Moderate  Onset quality:  Gradual  Duration:  3 days  Timing:  Constant  Progression:  Worsening  Chronicity:  New  Context: URI    Context comment:  Developed rhinorrhea, nasal congestion, cough x 3 days ago  Worsening shortness of breath this AM  No known hx of asthma, COPD  Relieved by:  Nothing  Worsened by: Activity, coughing, deep breathing and movement  Ineffective treatments: OTC medications  Associated symptoms: cough and sputum production    Associated symptoms: no abdominal pain, no chest pain, no ear pain, no fever, no headaches, no hemoptysis, no neck pain, no rash, no sore throat, no vomiting and no wheezing      Prior to Admission Medications   Prescriptions Last Dose Informant Patient Reported? Taking?    Sennosides (Laxative Pills) 25 MG TABS   Yes No   Sig: Take 2 tablets by mouth daily at bedtime   acetaminophen (TYLENOL) 500 mg tablet   Yes No   Sig: Take 1,000 mg by mouth every 6 (six) hours as needed for mild pain (Pt reports that she may take 3 at a time)   ciclopirox (LOPROX) 0 77 % cream   No No   Sig: Apply topically 2 (two) times a day   metFORMIN (GLUCOPHAGE-XR) 500 mg 24 hr tablet   No No   Sig: Take 1 tablet (500 mg total) by mouth daily with breakfast   phentermine 30 MG capsule   No No   Sig: Take 1 capsule (30 mg total) by mouth every morning      Facility-Administered Medications: None       Past Medical History:   Diagnosis Date   • Allergic Several years ago   • Arthritis Several years ago   • Bronchitis    • Elevated hemoglobin A1c     Pt states she does not take medication but is trying to lower with diet and exercise ( 7 1)   • Kidney stone Several years ago   • Lipoma of arm     LUE   • Obesity Several years ago   • Sphincter of Oddi dysfunction        Past Surgical History:   Procedure Laterality Date   • BLADDER SUSPENSION     • BREAST LUMPECTOMY Left     benign mass   • CHOLECYSTECTOMY     • COLONOSCOPY     • HYSTERECTOMY     • OOPHORECTOMY Bilateral    • ND EXC SKIN BENIG 3 1-4 CM TRUNK,ARM,LEG Left 6/3/2022    Procedure: UPPER ARM MASS EXCISION;  Surgeon: Fredi Addison DO;  Location: [facility];  Service: General   • RECTAL PROLAPSE REPAIR         Family History   Problem Relation Age of Onset   • No Known Problems Mother    • Diabetes Father    • No Known Problems Sister    • No Known Problems Daughter    • Leukemia Maternal Grandmother    • No Known Problems Maternal Grandfather    • No Known Problems Paternal Grandmother    • No Known Problems Paternal Grandfather    • No Known Problems Maternal Aunt    • No Known Problems Maternal Aunt    • No Known Problems Paternal Aunt    • No Known Problems Paternal Aunt    • No Known Problems Sister    • No Known Problems Daughter    • BRCA2 Positive Neg Hx    • BRCA2 Negative Neg Hx    • BRCA1 Positive Neg Hx    • BRCA1 Negative Neg Hx    • BRCA 1/2 Neg Hx    • Ovarian cancer Neg Hx    • Endometrial cancer Neg Hx    • Colon cancer Neg Hx    • Breast cancer additional onset Neg Hx    • Breast cancer Neg Hx      I have reviewed and agree with the history as documented  E-Cigarette/Vaping   • E-Cigarette Use Never User      E-Cigarette/Vaping Substances     Social History     Tobacco Use   • Smoking status: Former     Packs/day: 0 50     Years: 0 00     Pack years: 0 00     Types: Cigarettes     Start date: 1972     Quit date: 1993     Years since quittin 6   • Smokeless tobacco: Never   Vaping Use   • Vaping Use: Never used   Substance Use Topics   • Alcohol use: Never   • Drug use: Never     Review of Systems   Constitutional: Positive for fatigue  Negative for activity change, appetite change, chills and fever     HENT: Positive for congestion and rhinorrhea  Negative for ear pain and sore throat  Eyes: Negative for pain, discharge, redness and itching  Respiratory: Positive for cough, sputum production and shortness of breath  Negative for hemoptysis, chest tightness and wheezing  Cardiovascular: Negative for chest pain, palpitations and leg swelling  Gastrointestinal: Negative for abdominal distention, abdominal pain, nausea and vomiting  Genitourinary: Negative for decreased urine volume, difficulty urinating, flank pain, pelvic pain and urgency  Musculoskeletal: Negative for back pain, myalgias, neck pain and neck stiffness  Skin: Negative for color change, pallor, rash and wound  Neurological: Negative for dizziness, syncope, light-headedness and headaches  Hematological: Does not bruise/bleed easily  Psychiatric/Behavioral: Positive for sleep disturbance  Negative for confusion  All other systems reviewed and are negative  Physical Exam  Physical Exam  Vitals and nursing note reviewed  Constitutional:       General: She is not in acute distress  Appearance: She is ill-appearing  She is not toxic-appearing  Interventions: She is not intubated  HENT:      Head: Normocephalic and atraumatic  Mouth/Throat:      Mouth: Mucous membranes are moist       Pharynx: Oropharynx is clear  No pharyngeal swelling or oropharyngeal exudate  Comments: Posterior pharyngeal erythema without exudate  Uvula midline  Eyes:      Extraocular Movements: Extraocular movements intact  Pupils: Pupils are equal, round, and reactive to light  Cardiovascular:      Rate and Rhythm: Normal rate and regular rhythm  Pulses: Normal pulses  Heart sounds: No murmur heard  Pulmonary:      Effort: No tachypnea, bradypnea, accessory muscle usage or respiratory distress  She is not intubated  Breath sounds: Normal breath sounds  No stridor  No decreased breath sounds, wheezing, rhonchi or rales  Chest:      Chest wall: No tenderness  Abdominal:      Tenderness: There is no abdominal tenderness  There is no guarding or rebound  Musculoskeletal:      Cervical back: Normal range of motion and neck supple  Right lower leg: No tenderness  No edema  Left lower leg: No tenderness  No edema  Skin:     General: Skin is warm and dry  Capillary Refill: Capillary refill takes less than 2 seconds  Coloration: Skin is not cyanotic or pale  Findings: No ecchymosis, erythema or rash  Nails: There is no clubbing  Neurological:      General: No focal deficit present  Mental Status: She is alert and oriented to person, place, and time  Cranial Nerves: No cranial nerve deficit  Motor: No weakness  Psychiatric:         Mood and Affect: Mood is anxious  Behavior: Behavior normal  Behavior is not agitated  Vital Signs  ED Triage Vitals [01/04/23 0304]   Temperature Pulse Respirations Blood Pressure SpO2   (!) 97 2 °F (36 2 °C) 84 18 (!) 163/104 93 %      Temp Source Heart Rate Source Patient Position - Orthostatic VS BP Location FiO2 (%)   Temporal Monitor -- -- --      Pain Score       --         Vitals:    01/04/23 0304 01/04/23 0315   BP: (!) 163/104 161/99   Pulse: 84 92     ED Medications  Medications   dextromethorphan-guaiFENesin (ROBITUSSIN DM) oral syrup 10 mL (10 mL Oral Given 1/4/23 0337)   dexamethasone oral liquid 10 mg 1 mL (10 mg Oral Given 1/4/23 0338)     Diagnostic Studies  Results Reviewed     Procedure Component Value Units Date/Time    FLU/RSV/COVID - if FLU/RSV clinically relevant [[id number]] Collected: 01/04/23 0311    Lab Status:  In process Specimen: Nares from Nose Updated: 01/04/23 0323             No orders to display          Procedures  Procedures     ED Course  ED Course as of 01/04/23 0355   Wed Jan 04, 2023   0353 No acute cardiopulmonary disease noted on CXR       Medical Decision Making  History and clinical findings are most consistent with the below diagnosis/diagnoses  The patient's vital signs were stable throughout the course of treatment in the ED  Normal phonation, able to tolerate secretions  Lung exam normal  CXR without signs of pneumonia  Likely viral illness/pharyngitis  RVP pending  The patient will be contacted with the results  The patient was administered a dose of decadron and Robitussin DM  Return precautions discussed  All questions were addressed  The patient was stable for discharge  Pharyngitis, unspecified etiology: acute illness or injury  Viral URI with cough: acute illness or injury  Amount and/or Complexity of Data Reviewed  Labs: ordered  Radiology: ordered and independent interpretation performed  Decision-making details documented in ED Course  Risk  OTC drugs  Disposition  Final diagnoses:   Viral URI with cough   Pharyngitis, unspecified etiology     Time reflects when diagnosis was documented in both MDM as applicable and the Disposition within this note     Time User Action Codes Description Comment    1/4/2023  3:38 AM Vida Shin Add [J06 9] Viral URI with cough     1/4/2023  3:38 AM Vida Patino [J02 9] Pharyngitis, unspecified etiology       ED Disposition     ED Disposition   Discharge    Condition   Stable    Date/Time   Wed Jan 4, 2023  3:37 AM    Comment   Winter Torre discharge to home/self care  Follow-up Information    None         Patient's Medications   Discharge Prescriptions    DEXTROMETHORPHAN-GUAIFENESIN (ROBITUSSIN DM)  MG/5 ML SYRUP    Take 10 mL by mouth every 4 (four) hours as needed for cough       Start Date: 1/4/2023  End Date: --       Order Dose: 10 mL       Quantity: 473 mL    Refills: 0       No discharge procedures on file      PDMP Review       Value Time User    PDMP Reviewed  Yes 12/16/2022 12:56 PM Alayna Blackmon [facility]          ED Provider  Electronically Signed by           Rebecca Tavarez DO  01/04/23 5912

## 2023-01-04 NOTE — DISCHARGE INSTRUCTIONS
There were no signs of pneumonia noted on the chest xray  You likely have a viral illness  You are being prescribed a course of Robitussin DM  Please take as directed  You were also administered a dose of Decadron for treatment of the sore throat  Drink plenty of fluids over the next few days  Do not hesitate to return to the ED for any concerning signs or symptoms

## 2023-01-09 ENCOUNTER — CLINICAL SUPPORT (OUTPATIENT)
Dept: FAMILY MEDICINE CLINIC | Facility: CLINIC | Age: 68
End: 2023-01-09

## 2023-01-09 ENCOUNTER — TELEPHONE (OUTPATIENT)
Dept: FAMILY MEDICINE CLINIC | Facility: CLINIC | Age: 68
End: 2023-01-09

## 2023-01-09 NOTE — TELEPHONE ENCOUNTER
Prior auth completed for [facility] KANDI MARQUEZ through cover my meds Key: [id number]     Pharmacist Tracking Tool  Reason For Outreach: Embedded Pharmacist  Demographics:  Intervention Method: Chart Review  Type of Intervention: Follow-Up  Topics Addressed: Obesity  Pharmacologic Interventions: N/A  Non-Pharmacologic Interventions: Care coordination  Time:  Direct Patient Care: 0 mins  Care Coordination: 15 mins  Recommendation Recipient: N/A  Outcome: N/A

## 2023-01-09 NOTE — PROGRESS NOTES
[address] Debbie Sage, Pharmacist     Yves Flor is a 79 y o  female who was referred to the pharmacist for obesity and weight loss education and management, referred by Chris Ramires   Assessment/ Plan      1  Obesity   • Baseline Weight: 183 lbs   • Most recent Weight: 183 lbs  • 5% weight loss goal (to be met at week 12): 173 lbs   • Weight loss: Stage 2 - BMI > 25 with at least one severe complication or requires more aggressive weight loss for effective treatment - add pharmacotherapy with BMI > 27, consider bariatric surgery with BMI > 35 per the AACE/ACE guidelines  • Patient's weight-related disease of complication: diabetes  • Medication options/discussion  o Tried and failed contrave, phentermine, and Prem    o Patient would benefit from GLP-1 agonist for weight loss and due to hx DM (past A1c of 7 8% (6/6/22) and 6 8% (10/6/22))  No hx of MEN2 or MTC  States she did have pancrease injury during another surgery for her Sphincter of Oddi dysfunction where the surgeon accidentally hit the pancrease, but no hx of spontaneous or drug induced pancreatitis  Changes to Medication Regimen: If PCP is in agreement with plan  Initiate Semaglutide [facility]): Titration schedule - 0 25 mg weekly x4 weeks, then 0 5 mg weekly x4 weeks, then 1 mg weekly x4 weeks, then 1 7 mg weekly x4 weeks, then 2 4 mg weekly thereafter as tolerated - If the patient cannot tolerate an increased dose during dose escalation, consider delaying dose escalation for one additional month    A 5% weight loss goal with Jayashree Schmitz is recommended at 12 weeks per AACE/ACE guidelines  If goal is not reached, medication should be discontinued    2  Medication Reconciliation:   • Medication list reviewed with pt at today's visit  • Medication list updated to reflect medications pt is currently taking    3   BMI Counseling The BMI is above normal  Nutrition recommendations include reducing portion sizes, decreasing overall calorie intake and 3-5 servings of fruits/vegetables daily  Exercise recommendations include moderate aerobic physical activity for 150 minutes/week  Pharmacotherapy was ordered for patient to aid in weight loss  Monitoring: weekly weights     Referrals placed at this visit: None    Follow-up: TBD based on prior auth status       Subjective        1  Previous weight loss medication  • Phentermine 30 mg - Has been on for at least 3 months  Lost ~ 10 -12 pounds on phentermine  Since the holidays gained about 5 pounds  • Contrave- made her sick (nasuea)   • Go -Lo OTC  • Prem - didn't work and side effects (oily stools)   • Hyrdroxycut   • Apple cider vinegar   • Metformin -did not tolerate; felt loopy / dizzy / loss orientation      2  Lifestyle:   • Water: Mixes water plus lemon plus apple cider (1 TBLS) (32 oz)   • Diet  o Doesn't eat fruit  Eats more meat/protein and vegetables  Low carbs  Avoid pasta and bread  Occasional potato chips and cheese  Once in awhile cake but not often  • Physical Activity: Not physically active  Constantly walking in house      Objective       ASCVD Risk:  The 10-year ASCVD risk score (Joon SEALS, et al , 2019) is: 10 4%    Values used to calculate the score:      Age: 79 years      Sex: Female      Is Non- : No      Diabetic: No      Tobacco smoker: No      Systolic Blood Pressure: 987 mmHg      Is BP treated: No      HDL Cholesterol: 48 mg/dL      Total Cholesterol: 173 mg/dL     Vitals:     Wt Readings from Last 5 Encounters:   01/04/23 81 6 kg (180 lb)   10/06/22 84 6 kg (186 lb 9 6 oz)   07/05/22 88 5 kg (195 lb 3 2 oz)   06/04/22 88 5 kg (195 lb)   06/03/22 88 5 kg (195 lb)       BP Readings from Last 3 Encounters:   01/04/23 161/99   10/06/22 130/76   07/05/22 130/100       Pulse Readings from Last 3 Encounters:   01/04/23 92   10/06/22 94   07/05/22 90       Labs:  Lab Results   Component Value Date SODIUM 137 03/25/2022    K 4 1 03/25/2022     03/25/2022    CO2 23 03/25/2022    AGAP 6 03/25/2022    BUN 22 03/25/2022    CREATININE 1 09 03/25/2022    GLUF 206 (H) 03/25/2022    CALCIUM 8 9 03/25/2022    AST 43 03/25/2022    ALT 65 03/25/2022    ALKPHOS 113 03/25/2022    TP 7 4 03/25/2022    TBILI 0 62 03/25/2022    EGFR 53 03/25/2022         Pharmacist Tracking Tool       Pharmacist Tracking Tool  Reason For Outreach: Embedded Pharmacist  Demographics:  Intervention Method:  In Person  Type of Intervention: New  Topics Addressed: Diabetes and Obesity  Pharmacologic Interventions: Medication Initiation  Non-Pharmacologic Interventions: Disease state education, Home Monitoring and Medication/Device education  Time:  Direct Patient Care: 30 mins  Care Coordination: 15 mins  Recommendation Recipient: Patient/Caregiver and Provider  Outcome: Accepted

## 2023-01-13 NOTE — TELEPHONE ENCOUNTER
Destini Jaden was covered at the pharmacy without need for prior auth  Co-pay $60 for 28 day supply  Sent patient link for wegovy savings card       Pharmacist Tracking Tool  Reason For Outreach: Embedded Pharmacist  Demographics:  Intervention Method: Phone  Type of Intervention: Follow-Up  Topics Addressed: Obesity  Pharmacologic Interventions: N/A  Non-Pharmacologic Interventions: Care coordination  Time:  Direct Patient Care: 5 mins  Care Coordination: 10 mins  Recommendation Recipient: N/A  Outcome: N/A

## 2023-01-30 ENCOUNTER — CLINICAL SUPPORT (OUTPATIENT)
Dept: FAMILY MEDICINE CLINIC | Facility: CLINIC | Age: 68
End: 2023-01-30

## 2023-01-30 VITALS — WEIGHT: 180 LBS | BODY MASS INDEX: 32.92 KG/M2

## 2023-01-30 NOTE — PROGRESS NOTES
119 Debbie Sage, Pharmacist     Val Hamilton is a 79 y o  female who was referred to the pharmacist for obesity and weight loss education and management, referred by Alayna Huitron   Assessment/ Plan      1  Obesity   • Baseline Weight: 183 lbs   • Most recent Weight: 180 lbs (1/28/23)  • 5% weight loss goal (to be met at week 12): 173 lbs   • Weight loss: Stage 2 - BMI > 25 with at least one severe complication or requires more aggressive weight loss for effective treatment - add pharmacotherapy with BMI > 27, consider bariatric surgery with BMI > 35 per the AACE/ACE guidelines  • Patient's weight-related disease of complication: diabetes  • Medication options/discussion  o Currently on GLP-1 agonist  o Tried and failed contrave, phentermine, and Prem  Changes to Medication Regimen: If PCP is in agreement with plan  · Tolerating Wegovy starting dose of 0 25 mg weekly  In 1 week increase to 0 5 mg weekly  Rx pended for next higher dose    A 5% weight loss goal with Faulkner Bending is recommended at 12 weeks per AACE/ACE guidelines  If goal is not reached, medication should be discontinued    2  Medication Reconciliation:   • Medication list reviewed with pt at today's visit  • Medication list updated to reflect medications pt is currently taking    3  BMI Counseling The BMI is above normal  Nutrition recommendations include reducing portion sizes, decreasing overall calorie intake and 3-5 servings of fruits/vegetables daily  Exercise recommendations include moderate aerobic physical activity for 150 minutes/week  Pharmacotherapy was ordered for patient to aid in weight loss  Monitoring: weekly weights     Referrals placed at this visit: None    Follow-up: 4 weeks      Subjective        1  Current  · Wegovy 0 25 mg weekly- started 1/14/23 Saturdays are injection days  Small amount of constipation which is managed by taking a daily senna  Denies N/V/D  Previous weight loss medication  • Phentermine 30 mg - Has been on for at least 3 months  Lost ~ 10 -12 pounds on phentermine  Since the holidays gained about 5 pounds  • Contrave- made her sick (nasuea)   • Go -Lo OTC  • Prem - didn't work and side effects (oily stools)   • Hyrdroxycut   • Apple cider vinegar   • Metformin -did not tolerate; felt loopy / dizzy / loss orientation      2  Lifestyle:   • Noticed decrease in appetite  Doesn't find that she is hungry most of the day  She will eat, but notices that she is having smaller portion sizes  • Water: Mixes water plus lemon plus apple cider (1 TBLS) (32 oz)   • Diet  o Doesn't eat fruit  Eats more meat/protein and vegetables  Low carbs  Avoid pasta and bread  Occasional potato chips and cheese  Once in awhile cake but not often  • Physical Activity: Not physically active  Constantly walking in house      Objective       ASCVD Risk:  The 10-year ASCVD risk score (Joon SEALS, et al , 2019) is: 10 4%    Values used to calculate the score:      Age: 79 years      Sex: Female      Is Non- : No      Diabetic: No      Tobacco smoker: No      Systolic Blood Pressure: 200 mmHg      Is BP treated: No      HDL Cholesterol: 48 mg/dL      Total Cholesterol: 173 mg/dL     Vitals:     Wt Readings from Last 5 Encounters:   01/04/23 81 6 kg (180 lb)   10/06/22 84 6 kg (186 lb 9 6 oz)   07/05/22 88 5 kg (195 lb 3 2 oz)   06/04/22 88 5 kg (195 lb)   06/03/22 88 5 kg (195 lb)       BP Readings from Last 3 Encounters:   01/04/23 161/99   10/06/22 130/76   07/05/22 130/100       Pulse Readings from Last 3 Encounters:   01/04/23 92   10/06/22 94   07/05/22 90     Home scale weight  · 1/9/23: 183 lbs  · 1/14/23: 180 lbs     Labs:  Lab Results   Component Value Date    SODIUM 137 03/25/2022    K 4 1 03/25/2022     03/25/2022    CO2 23 03/25/2022    AGAP 6 03/25/2022    BUN 22 03/25/2022    CREATININE 1 09 03/25/2022    GLUF 206 (H) 03/25/2022    CALCIUM 8 9 03/25/2022    AST 43 03/25/2022    ALT 65 03/25/2022    ALKPHOS 113 03/25/2022    TP 7 4 03/25/2022    TBILI 0 62 03/25/2022    EGFR 53 03/25/2022         Pharmacist Tracking Tool       Pharmacist Tracking Tool  Reason For Outreach: Embedded Pharmacist  Demographics:  Intervention Method: Phone  Type of Intervention: Follow-Up  Topics Addressed: Diabetes and Obesity  Pharmacologic Interventions: Dose or Frequency Adjusted  Non-Pharmacologic Interventions: Disease state education, Home Monitoring and Medication/Device education  Time:  Direct Patient Care: 15 mins  Care Coordination: 15 mins  Recommendation Recipient: Patient/Caregiver and Provider  Outcome: Accepted

## 2023-02-23 NOTE — PROGRESS NOTES
[address] Debbie Sage, Pharmacist     Chuy Mooney is a 79 y o  female who was referred to the pharmacist for obesity and weight loss education and management, referred by Chris Andre   Assessment/ Plan      1  Obesity   • Baseline Weight: 183 lbs   • Most recent Weight: 180 lbs  • 5% weight loss goal (to be met at week 12): 173 lbs   • Weight loss: Stage 2 - BMI > 25 with at least one severe complication or requires more aggressive weight loss for effective treatment - add pharmacotherapy with BMI > 27, consider bariatric surgery with BMI > 35 per the AACE/ACE guidelines  • Patient's weight-related disease of complication: diabetes  • Medication options/discussion  o Currently on GLP-1 agonist  o Tried and failed contrave, phentermine, and Prem  Changes to Medication Regimen: If PCP is in agreement with plan  · Tolerating Wegovy 0 5 mg weekly  Increase further to 1 mg once weekly  A 5% weight loss goal with Ghazal Brito is recommended at 12 weeks per AACE/ACE guidelines  If goal is not reached, medication should be discontinued    2  Medication Reconciliation:   • Medication list reviewed with pt at today's visit  • Medication list updated to reflect medications pt is currently taking    3  BMI Counseling The BMI is above normal  Nutrition recommendations include reducing portion sizes, decreasing overall calorie intake and 3-5 servings of fruits/vegetables daily  Exercise recommendations include moderate aerobic physical activity for 150 minutes/week  Pharmacotherapy was ordered for patient to aid in weight loss  Monitoring: weekly weights     Referrals placed at this visit: None    Follow-up: 4 weeks      Subjective        1  Current  · Wegovy 0 5 mg weekly- increased on 2/11/23 Saturdays are injection days  Small amount of constipation which is managed by taking a daily senna  Denies N/V/D       Previous weight loss medication  • Phentermine 30 mg - Has been on for at least 3 months  Lost ~ 10 -12 pounds on phentermine  Since the holidays gained about 5 pounds  • Contrave- made her sick (nasuea)   • Go -Lo OTC  • Prem - didn't work and side effects (oily stools)   • Hyrdroxycut   • Apple cider vinegar   • Metformin -did not tolerate; felt loopy / dizzy / loss orientation      2  Lifestyle:   • Noticed decrease in appetite  Doesn't find that she is hungry most of the day  She will eat, but notices that she is having smaller portion sizes  Craving more chocolate but overall has been managing that/ avoiding overindulgence  • Water: Mixes water plus lemon plus apple cider (1 TBLS) (32 oz)   • Diet  o Doesn't eat fruit  Eats more meat/protein and vegetables  Low carbs  Avoid pasta and bread  Occasional potato chips and cheese  Once in awhile cake but not often  • Physical Activity: Not physically active  Constantly walking in house      Objective       ASCVD Risk:  The 10-year ASCVD risk score (Joon SEALS, et al , 2019) is: 10 4%    Values used to calculate the score:      Age: 79 years      Sex: Female      Is Non- : No      Diabetic: No      Tobacco smoker: No      Systolic Blood Pressure: 673 mmHg      Is BP treated: No      HDL Cholesterol: 48 mg/dL      Total Cholesterol: 173 mg/dL     Vitals:     Wt Readings from Last 5 Encounters:   01/30/23 81 6 kg (180 lb)   01/04/23 81 6 kg (180 lb)   10/06/22 84 6 kg (186 lb 9 6 oz)   07/05/22 88 5 kg (195 lb 3 2 oz)   06/04/22 88 5 kg (195 lb)       BP Readings from Last 3 Encounters:   01/04/23 161/99   10/06/22 130/76   07/05/22 130/100       Pulse Readings from Last 3 Encounters:   01/04/23 92   10/06/22 94   07/05/22 90     Home scale weight  · 1/9/23: 183 lbs  · 1/14/23: 180 lbs   · 2/27/23: 180 lbs     Labs:  Lab Results   Component Value Date    SODIUM 137 03/25/2022    K 4 1 03/25/2022     03/25/2022    CO2 23 03/25/2022    AGAP 6 03/25/2022 BUN 22 03/25/2022    CREATININE 1 09 03/25/2022    GLUF 206 (H) 03/25/2022    CALCIUM 8 9 03/25/2022    AST 43 03/25/2022    ALT 65 03/25/2022    ALKPHOS 113 03/25/2022    TP 7 4 03/25/2022    TBILI 0 62 03/25/2022    EGFR 53 03/25/2022         Pharmacist Tracking Tool       Pharmacist Tracking Tool  Reason For Outreach: Embedded Pharmacist  Demographics:  Intervention Method: Phone  Type of Intervention: Follow-Up  Topics Addressed: Diabetes and Obesity  Pharmacologic Interventions: Dose or Frequency Adjusted  Non-Pharmacologic Interventions: Disease state education, Home Monitoring and Medication/Device education  Time:  Direct Patient Care: 15 mins  Care Coordination: 15 mins  Recommendation Recipient: Patient/Caregiver and Provider  Outcome: Accepted

## 2023-02-27 ENCOUNTER — CLINICAL SUPPORT (OUTPATIENT)
Dept: FAMILY MEDICINE CLINIC | Facility: CLINIC | Age: 68
End: 2023-02-27

## 2023-02-27 DIAGNOSIS — R73.09 ELEVATED HEMOGLOBIN A1C: ICD-10-CM

## 2023-03-27 ENCOUNTER — CLINICAL SUPPORT (OUTPATIENT)
Dept: FAMILY MEDICINE CLINIC | Facility: CLINIC | Age: 68
End: 2023-03-27

## 2023-03-27 NOTE — PROGRESS NOTES
119 Debbie Sage, Pharmacist     Mateo Marks is a 79 y o  female who was referred to the pharmacist for obesity and weight loss education and management, referred by Alayna Kan   Assessment/ Plan      1  Obesity   • Baseline Weight: 183 lbs   • Most recent Weight: 174 lbs (3/27/23)  • 5% weight loss goal (to be met at week 12): 173 lbs   • Weight loss: Stage 2 - BMI > 25 with at least one severe complication or requires more aggressive weight loss for effective treatment - add pharmacotherapy with BMI > 27, consider bariatric surgery with BMI > 35 per the AACE/ACE guidelines  • Patient's weight-related disease of complication: diabetes  • Medication options/discussion  o Currently on GLP-1 agonist  o Tried and failed contrave, phentermine, and Prem  Changes to Medication Regimen: If PCP is in agreement with plan  · Tolerating Wegovy 1 mg weekly  Increase further to 1 7 mg once weekly  A 5% weight loss goal with Kasie Hearing is recommended at 12 weeks per AACE/ACE guidelines  If goal is not reached, medication should be discontinued    2  Medication Reconciliation:   • Medication list reviewed with pt at today's visit  • Medication list updated to reflect medications pt is currently taking    3  BMI Counseling The BMI is above normal  Nutrition recommendations include reducing portion sizes, decreasing overall calorie intake and 3-5 servings of fruits/vegetables daily  Exercise recommendations include moderate aerobic physical activity for 150 minutes/week  Pharmacotherapy was ordered for patient to aid in weight loss  Monitoring: weekly weights     Referrals placed at this visit: None    Follow-up: 4 weeks    Subjective        1  Current  · Wegovy 1 mg weekly- increased on 3/11/23  Saturdays are injection days  Denies N/V/D  Denies constipation  No issues on current dose       Previous weight loss medication  • Phentermine 30 mg - Has been on for at least 3 months  Lost ~ 10 -12 pounds on phentermine  Since the holidays gained about 5 pounds  • Contrave- made her sick (nasuea)   • Go -Lo OTC  • Prem - didn't work and side effects (oily stools)   • Hyrdroxycut   • Apple cider vinegar   • Metformin -did not tolerate; felt loopy / dizzy / loss orientation      2  Lifestyle:   • Noticed decrease in appetite  Doesn't find that she is hungry most of the day  She will eat, but notices that she is having smaller portion sizes  Craving more chocolate but overall has been managing that/ avoiding overindulgence  • Water: Mixes water plus lemon plus apple cider (1 TBLS) (32 oz)   • Diet  o Doesn't eat fruit  Eats more meat/protein and vegetables  Low carbs  Avoid pasta and bread  Occasional potato chips and cheese  Once in awhile cake but not often  • Physical Activity: Not physically active  Constantly walking in house      Objective       ASCVD Risk:  The 10-year ASCVD risk score (Joon SEALS, et al , 2019) is: 10 4%    Values used to calculate the score:      Age: 79 years      Sex: Female      Is Non- : No      Diabetic: No      Tobacco smoker: No      Systolic Blood Pressure: 240 mmHg      Is BP treated: No      HDL Cholesterol: 48 mg/dL      Total Cholesterol: 173 mg/dL     Vitals:     Wt Readings from Last 5 Encounters:   01/30/23 81 6 kg (180 lb)   01/04/23 81 6 kg (180 lb)   10/06/22 84 6 kg (186 lb 9 6 oz)   07/05/22 88 5 kg (195 lb 3 2 oz)   06/04/22 88 5 kg (195 lb)       BP Readings from Last 3 Encounters:   01/04/23 161/99   10/06/22 130/76   07/05/22 130/100       Pulse Readings from Last 3 Encounters:   01/04/23 92   10/06/22 94   07/05/22 90     Home scale weight  · 1/9/23: 183 lbs  · 1/14/23: 180 lbs   · 2/27/23: 180 lbs   · 3/27/23: 174 lbs     Labs:  Lab Results   Component Value Date    SODIUM 137 03/25/2022    K 4 1 03/25/2022     03/25/2022    CO2 23 03/25/2022    AGAP 6 03/25/2022    BUN 22 03/25/2022 CREATININE 1 09 03/25/2022    GLUF 206 (H) 03/25/2022    CALCIUM 8 9 03/25/2022    AST 43 03/25/2022    ALT 65 03/25/2022    ALKPHOS 113 03/25/2022    TP 7 4 03/25/2022    TBILI 0 62 03/25/2022    EGFR 53 03/25/2022         Pharmacist Tracking Tool       Pharmacist Tracking Tool  Reason For Outreach: Embedded Pharmacist  Demographics:  Intervention Method: Phone  Type of Intervention: Follow-Up  Topics Addressed: Diabetes and Obesity  Pharmacologic Interventions: Dose or Frequency Adjusted  Non-Pharmacologic Interventions: Disease state education, Home Monitoring and Medication/Device education  Time:  Direct Patient Care: 15 mins  Care Coordination: 15 mins  Recommendation Recipient: Patient/Caregiver and Provider  Outcome: Accepted

## 2023-04-24 ENCOUNTER — CLINICAL SUPPORT (OUTPATIENT)
Dept: FAMILY MEDICINE CLINIC | Facility: CLINIC | Age: 68
End: 2023-04-24

## 2023-04-24 NOTE — PROGRESS NOTES
119 Debbie Sage, Pharmacist     Anuradha Dixon is a 79 y o  female who was referred to the pharmacist for obesity and weight loss education and management, referred by Chris Karimi   Assessment/ Plan      1  Obesity   • Baseline Weight: 183 lbs   • Most recent Weight: 170 lbs (3/27/23)  • 5% weight loss goal (to be met at week 12): 173 lbs   • Weight loss: Stage 2 - BMI > 25 with at least one severe complication or requires more aggressive weight loss for effective treatment - add pharmacotherapy with BMI > 27, consider bariatric surgery with BMI > 35 per the AACE/ACE guidelines  • Patient's weight-related disease of complication: diabetes  • Medication options/discussion  o Currently on GLP-1 agonist  o Tried and failed contrave, phentermine, and Prem  Changes to Medication Regimen: If PCP is in agreement with plan  · Tolerating Wegovy 1 7 mg weekly  Increase further to max dose of 2 4 mg once weekly  A 5[facility] weight [facility] goal [facility] is recommended at 12 weeks per AACE/ACE guidelines  If goal is not reached, medication should be discontinued    2  Medication Reconciliation:   • Medication list reviewed with pt at today's visit  • Medication list updated to reflect medications pt is currently taking    3  BMI Counseling The BMI is above normal  Nutrition recommendations include reducing portion sizes, decreasing overall calorie intake and 3-5 servings of fruits/vegetables daily  Exercise recommendations include moderate aerobic physical activity for 150 minutes/week  Pharmacotherapy was ordered for patient to aid in weight loss  Monitoring: weekly weights     Referrals placed at this visit: None    Follow-up: 4 weeks    Subjective        1  Current  · Wegovy 1 7 mg weekly- first injection at higher dose and nausea and constipated for ~2 days but this subsided and has not had issues with any doses after that       Previous weight loss medication  • Phentermine 30 mg - was on for ~3 months  Lost ~ 10 -12 pounds on phentermine but gained weight back around the holidays  • Contrave- made her sick (nasuea)   • Go -Lo OTC  • Prem - didn't work and side effects (oily stools)   • Hyrdroxycut   • Apple cider vinegar   • Metformin -did not tolerate; felt loopy / dizzy / loss orientation      2  Lifestyle:   • Noticed decrease in appetite  Doesn't find that she is hungry most of the day  She will eat, but notices that she is having smaller portion sizes  Craving more chocolate but overall has been managing that/ avoiding overindulgence  • Water: Mixes water plus lemon plus apple cider (1 TBLS) (32 oz)   • Diet  o Doesn't eat fruit  Eats more meat/protein and vegetables  Low carbs  Avoid pasta and bread  Occasional potato chips and cheese  Once in awhile cake but not often  • Physical Activity: Not physically active  Constantly walking in house      Objective       ASCVD Risk:  The 10-year ASCVD risk score (Joon SEALS, et al , 2019) is: 10 4%    Values used to calculate the score:      Age: 79 years      Sex: Female      Is Non- : No      Diabetic: No      Tobacco smoker: No      Systolic Blood Pressure: 573 mmHg      Is BP treated: No      HDL Cholesterol: 48 mg/dL      Total Cholesterol: 173 mg/dL     Vitals:     Wt Readings from Last 5 Encounters:   01/30/23 81 6 kg (180 lb)   01/04/23 81 6 kg (180 lb)   10/06/22 84 6 kg (186 lb 9 6 oz)   07/05/22 88 5 kg (195 lb 3 2 oz)   06/04/22 88 5 kg (195 lb)       BP Readings from Last 3 Encounters:   01/04/23 161/99   10/06/22 130/76   07/05/22 130/100       Pulse Readings from Last 3 Encounters:   01/04/23 92   10/06/22 94   07/05/22 90     Home scale weight  · 1/9/23: 183 lbs  · 1/14/23: 180 lbs   · 2/27/23: 180 lbs   · 3/27/23: 174 lbs   · 4/24/23: 170 lbs     Labs:  Lab Results   Component Value Date    SODIUM 137 03/25/2022    K 4 1 03/25/2022     03/25/2022    CO2 23 03/25/2022    AGAP 6 03/25/2022    BUN 22 03/25/2022    CREATININE 1 09 03/25/2022    GLUF 206 (H) 03/25/2022    CALCIUM 8 9 03/25/2022    AST 43 03/25/2022    ALT 65 03/25/2022    ALKPHOS 113 03/25/2022    TP 7 4 03/25/2022    TBILI 0 62 03/25/2022    EGFR 53 03/25/2022         Pharmacist Tracking Tool       Pharmacist Tracking Tool  Reason For Outreach: Embedded Pharmacist  Demographics:  Intervention Method: Phone  Type of Intervention: Follow-Up  Topics Addressed: Diabetes and Obesity  Pharmacologic Interventions: Dose or Frequency Adjusted  Non-Pharmacologic Interventions: Disease state education, Home Monitoring and Medication/Device education  Time:  Direct Patient Care: 15 mins  Care Coordination: 15 mins  Recommendation Recipient: Patient/Caregiver and Provider  Outcome: Accepted

## 2023-04-25 ENCOUNTER — TELEPHONE (OUTPATIENT)
Dept: FAMILY MEDICINE CLINIC | Facility: CLINIC | Age: 68
End: 2023-04-25

## 2023-04-25 NOTE — TELEPHONE ENCOUNTER
Received prior auth for Wegovy 2 4 mg  Prior auth completed and awaiting determination    Cover my medsKey: [address]     Pharmacist Tracking Tool  Reason For Outreach: Embedded Pharmacist  Demographics:  Intervention Method: Chart Review  Type of Intervention: Follow-Up  Topics Addressed: Obesity  Pharmacologic Interventions: N/A  Non-Pharmacologic Interventions: Care coordination  Time:  Direct Patient Care: 0 mins  Care Coordination: 15 mins  Recommendation Recipient: N/A  Outcome: N/A

## 2023-04-26 NOTE — TELEPHONE ENCOUNTER
Update: Insurance responded to prior [facility] request stating PA if not required for medication  [facility] is covered without need for prior auth

## 2023-05-03 ENCOUNTER — OFFICE VISIT (OUTPATIENT)
Dept: URGENT CARE | Facility: CLINIC | Age: 68
End: 2023-05-03

## 2023-05-03 VITALS
DIASTOLIC BLOOD PRESSURE: 92 MMHG | TEMPERATURE: 97.4 F | WEIGHT: 180 LBS | SYSTOLIC BLOOD PRESSURE: 153 MMHG | BODY MASS INDEX: 33.13 KG/M2 | HEART RATE: 99 BPM | RESPIRATION RATE: 18 BRPM | OXYGEN SATURATION: 94 % | HEIGHT: 62 IN

## 2023-05-03 DIAGNOSIS — H10.33 ACUTE CONJUNCTIVITIS OF BOTH EYES, UNSPECIFIED ACUTE CONJUNCTIVITIS TYPE: Primary | ICD-10-CM

## 2023-05-03 RX ORDER — TOBRAMYCIN 3 MG/ML
2 SOLUTION/ DROPS OPHTHALMIC
Qty: 3.5 ML | Refills: 0 | Status: SHIPPED | OUTPATIENT
Start: 2023-05-03 | End: 2023-05-10

## 2023-05-03 NOTE — PROGRESS NOTES
[facility]        NAME: Lorna Fairchild is a 79 y o  female  : 1955    MRN: [id number]  DATE: May 3, 2023  TIME: 2:08 PM    Assessment and Plan   Acute conjunctivitis of both eyes, unspecified acute conjunctivitis type [H10 33]  1  Acute conjunctivitis of both eyes, unspecified acute conjunctivitis type  tobramycin (TOBREX) 0 3 % SOLN        Suspect allergic conjunctivitis  Will have patient trial over-the-counter antihistamine eyedrops  Patient may trial prescription antibiotic eyedrops if no improvement  Patient Instructions   Over-the-counter antihistamine eyedrops  Cool compress  Avoid rubbing eyes  Consider alternative oral antihistamine  Monitor symptoms  Follow up with PCP in 3-5 days  Proceed to  ER if symptoms worsen  Chief Complaint     Chief Complaint   Patient presents with    Eye Drainage     Pt reports b/l eye itchiness and pain for the past 3 days         History of Present Illness       Patient is a 15-year-old female with significant past medical history of seasonal allergies presents the office complaining of bilateral eye itchiness and pepe feeling for 3 days  States she wakes up with eye crusty's but then they resolve  Denies vision changes, photophobia, painful EOMs, persistent discharge, or foreign body sensation  She wears glasses but no contacts  She has tried taking Allegra with no relief  Today of exposure to pinkeye but a few weeks ago  Review of Systems   Review of Systems   Eyes: Positive for pain, redness and itching  Negative for photophobia, discharge and visual disturbance           Current Medications       Current Outpatient Medications:     acetaminophen (TYLENOL) 500 mg tablet, Take 1,000 mg by mouth every 6 (six) hours as needed for mild pain (Pt reports that she may take 3 at a time), Disp: , Rfl:     Semaglutide-Weight Management (WEGOVY) 2 4 MG/0 75ML, Inject 0 75 mL (2 4 mg total) under the skin once a week, Disp: 3 mL, Rfl: 5    Sennosides (Laxative Pills) 25 MG TABS, Take 2 tablets by mouth daily at bedtime, Disp: , Rfl:     tobramycin (TOBREX) 0 3 % SOLN, Administer 2 drops to both eyes every 4 (four) hours while awake for 7 days, Disp: 3 5 mL, Rfl: 0    ciclopirox (LOPROX) 0 77 % cream, Apply topically 2 (two) times a day, Disp: 90 g, Rfl: 2    dextromethorphan-guaiFENesin (ROBITUSSIN DM)  mg/5 mL syrup, Take 10 mL by mouth every 4 (four) hours as needed for cough, Disp: 473 mL, Rfl: 0    metFORMIN (GLUCOPHAGE-XR) 500 mg 24 hr tablet, Take 1 tablet (500 mg total) by mouth daily with breakfast, Disp: 90 tablet, Rfl: 1    Current Allergies     Allergies as of 05/03/2023 - Reviewed 05/03/2023   Allergen Reaction Noted    Cayenne - food allergy Shortness Of Breath 08/26/2010    Pineapple - food allergy Shortness Of Breath 11/14/2018    Demerol [meperidine] Itching 08/12/2020    Oxycodone Itching 08/12/2020    Propoxyphene Itching 08/26/2010    Tegaderm alginate ag rope Rash and Swelling 06/04/2022            The following portions of the patient's history were reviewed and updated as appropriate: allergies, current medications, past family history, past medical history, past social history, past surgical history and problem list      Past Medical History:   Diagnosis Date    Allergic Several years ago    Arthritis Several years ago    Bronchitis     Elevated hemoglobin A1c     Pt states she does not take medication but is trying to lower with diet and exercise ( 7 1)    Kidney stone Several years ago    Lipoma of arm     LUE    Obesity Several years ago    Sphincter of Oddi dysfunction        Past Surgical History:   Procedure Laterality Date    BLADDER SUSPENSION      BREAST LUMPECTOMY Left 2000    benign mass    CHOLECYSTECTOMY      COLONOSCOPY      HYSTERECTOMY  1980    OOPHORECTOMY Bilateral 1980    CT EXC B9 LESION MRGN XCP SK TG T/A/L 3 1-4 0 CM Left 6/3/2022    Procedure: UPPER ARM MASS EXCISION; "Surgeon: Mallory King DO;  Location:  MAIN OR;  Service: General    RECTAL PROLAPSE REPAIR         Family History   Problem Relation Age of Onset    No Known Problems Mother     Diabetes Father     No Known Problems Sister     No Known Problems Daughter     Leukemia Maternal Grandmother     No Known Problems Maternal Grandfather     No Known Problems Paternal Grandmother     No Known Problems Paternal Grandfather     No Known Problems Maternal Aunt     No Known Problems Maternal Aunt     No Known Problems Paternal Aunt     No Known Problems Paternal Aunt     No Known Problems Sister     No Known Problems Daughter     BRCA2 Positive Neg Hx     BRCA2 Negative Neg Hx     BRCA1 Positive Neg Hx     BRCA1 Negative Neg Hx     BRCA 1/2 Neg Hx     Ovarian cancer Neg Hx     Endometrial cancer Neg Hx     Colon cancer Neg Hx     Breast cancer additional onset Neg Hx     Breast cancer Neg Hx          Medications have been verified  Objective   /92   Pulse 99   Temp (!) 97 4 °F (36 3 °C)   Resp 18   Ht 5' 2\" (1 575 m)   Wt 81 6 kg (180 lb)   SpO2 94%   BMI 32 92 kg/m²   No LMP recorded  Patient has had a hysterectomy  Physical Exam     Physical Exam  Vitals and nursing note reviewed  Constitutional:       Appearance: She is well-developed  HENT:      Head: Normocephalic and atraumatic  Right Ear: External ear normal       Left Ear: External ear normal       Nose: Nose normal    Eyes:      General: Lids are normal  Vision grossly intact  Right eye: No foreign body, discharge or hordeolum  Left eye: No foreign body, discharge or hordeolum  Extraocular Movements: Extraocular movements intact  Conjunctiva/sclera:      Right eye: Right conjunctiva is injected  Left eye: Left conjunctiva is injected  Pupils: Pupils are equal, round, and reactive to light  Skin:     General: Skin is warm and dry        Capillary Refill: Capillary " refill takes less than 2 seconds  Findings: No rash  Neurological:      Mental Status: She is alert

## 2023-05-22 ENCOUNTER — CLINICAL SUPPORT (OUTPATIENT)
Dept: FAMILY MEDICINE CLINIC | Facility: CLINIC | Age: 68
End: 2023-05-22

## 2023-05-22 NOTE — PROGRESS NOTES
[address] Debbie Sage, Pharmacist     Shira Sloan is a 76 y o  female who was referred to the pharmacist for obesity and weight loss education and management, referred by Alayna Barker   Assessment/ Plan      1  Obesity   • Baseline Weight: 183 lbs   • Most recent Weight: 170 lbs (5/22/23)  • 5% weight loss goal (to be met at week 12): 173 lbs   • Weight loss: Stage 2 - BMI > 25 with at least one severe complication or requires more aggressive weight loss for effective treatment - add pharmacotherapy with BMI > 27, consider bariatric surgery with BMI > 35 per the AACE/ACE guidelines  • Patient's weight-related disease of complication: diabetes  • Medication options/discussion  o Currently on GLP-1 agonist  o Tried and failed contrave, phentermine, and Prem  Changes to Medication Regimen: If PCP is in agreement with plan  · Tolerating Wegovy 2 4 mg weekly  Continue current dose  A 5% weight loss goal with [facility] is recommended at 12 weeks per AACE/ACE guidelines  If goal is not reached, medication should be discontinued    2  Medication Reconciliation:   • Medication list reviewed with pt at today's visit  • Medication list updated to reflect medications pt is currently taking    3  BMI Counseling The BMI is above normal  Nutrition recommendations include reducing portion sizes, decreasing overall calorie intake and 3-5 servings of fruits/vegetables daily  Exercise recommendations include moderate aerobic physical activity for 150 minutes/week  Pharmacotherapy was ordered for patient to aid in weight loss  Monitoring: weekly weights     Referrals placed at this visit: None    Follow-up:   · As needed with clinical pharmacist  Patient is aware of maternity leave  · Reminded he to make follow up appt with PCP    Subjective        1   Current  · Wegovy 2 4 mg weekly- will have a headache for first 1-2 days after injection but this is tolerable  Denies GI related side effects  Previous weight loss medication  • Phentermine 30 mg - was on for ~3 months  Lost ~ 10 -12 pounds on phentermine but gained weight back around the holidays  • Contrave- made her sick (nasuea)   • Go -Lo OTC  • Prem - didn't work and side effects (oily stools)   • Hyrdroxycut   • Apple cider vinegar   • Metformin -did not tolerate; felt loopy / dizzy / loss orientation      2  Lifestyle:   • Noticed decrease in appetite  Doesn't find that she is hungry most of the day  She will eat, but notices that she is having smaller portion sizes  Craving more chocolate but overall has been managing that/ avoiding overindulgence  • Water: Mixes water plus lemon plus apple cider (1 TBLS) (32 oz)   • Diet  o Doesn't eat fruit  Eats more meat/protein and vegetables  Low carbs  Avoid pasta and bread  Occasional potato chips and cheese  Once in awhile cake but not often  • Physical Activity: Not physically active  Constantly walking in house      Objective       ASCVD Risk:  The 10-year ASCVD risk score (Joon SEALS, et al , 2019) is: 10 5%    Values used to calculate the score:      Age: 76 years      Sex: Female      Is Non- : No      Diabetic: No      Tobacco smoker: No      Systolic Blood Pressure: 895 mmHg      Is BP treated: No      HDL Cholesterol: 48 mg/dL      Total Cholesterol: 173 mg/dL     Vitals:     Wt Readings from Last 5 Encounters:   05/03/23 81 6 kg (180 lb)   01/30/23 81 6 kg (180 lb)   01/04/23 81 6 kg (180 lb)   10/06/22 84 6 kg (186 lb 9 6 oz)   07/05/22 88 5 kg (195 lb 3 2 oz)       BP Readings from Last 3 Encounters:   05/03/23 153/92   01/04/23 161/99   10/06/22 130/76       Pulse Readings from Last 3 Encounters:   05/03/23 99   01/04/23 92   10/06/22 94     Home scale weight  · 1/9/23: 183 lbs  · 1/14/23: 180 lbs   · 2/27/23: 180 lbs   · 3/27/23: 174 lbs   · 4/24/23: 170 lbs   · 5/22/23: 170 lbs     Labs:  Lab Results   Component Value Date    SODIUM 137 03/25/2022    K 4 1 03/25/2022     03/25/2022    CO2 23 03/25/2022    AGAP 6 03/25/2022    BUN 22 03/25/2022    CREATININE 1 09 03/25/2022    GLUF 206 (H) 03/25/2022    CALCIUM 8 9 03/25/2022    AST 43 03/25/2022    ALT 65 03/25/2022    ALKPHOS 113 03/25/2022    TP 7 4 03/25/2022    TBILI 0 62 03/25/2022    EGFR 53 03/25/2022         Pharmacist Tracking Tool       Pharmacist Tracking Tool  Reason For Outreach: Embedded Pharmacist  Demographics:  Intervention Method: Phone  Type of Intervention: Follow-Up  Topics Addressed: Diabetes and Obesity  Pharmacologic Interventions: N/A  Non-Pharmacologic Interventions: Disease state education, Home Monitoring and Medication/Device education  Time:  Direct Patient Care: 10 mins  Care Coordination: 5 mins  Recommendation Recipient: Patient/Caregiver and Provider  Outcome: Accepted

## 2023-07-03 ENCOUNTER — OFFICE VISIT (OUTPATIENT)
Dept: FAMILY MEDICINE CLINIC | Facility: CLINIC | Age: 68
End: 2023-07-03
Payer: COMMERCIAL

## 2023-07-03 ENCOUNTER — APPOINTMENT (OUTPATIENT)
Dept: LAB | Facility: CLINIC | Age: 68
End: 2023-07-03
Payer: COMMERCIAL

## 2023-07-03 VITALS
DIASTOLIC BLOOD PRESSURE: 74 MMHG | WEIGHT: 169.6 LBS | OXYGEN SATURATION: 97 % | HEIGHT: 62 IN | SYSTOLIC BLOOD PRESSURE: 128 MMHG | BODY MASS INDEX: 31.21 KG/M2 | HEART RATE: 93 BPM

## 2023-07-03 DIAGNOSIS — Z53.20 MAMMOGRAM DECLINED: ICD-10-CM

## 2023-07-03 DIAGNOSIS — Z13.220 SCREENING FOR CHOLESTEROL LEVEL: ICD-10-CM

## 2023-07-03 DIAGNOSIS — Z00.00 ANNUAL PHYSICAL EXAM: ICD-10-CM

## 2023-07-03 DIAGNOSIS — R73.09 ELEVATED HEMOGLOBIN A1C: ICD-10-CM

## 2023-07-03 DIAGNOSIS — Z12.31 ENCOUNTER FOR SCREENING MAMMOGRAM FOR BREAST CANCER: ICD-10-CM

## 2023-07-03 DIAGNOSIS — Z53.20 COLONOSCOPY REFUSED: ICD-10-CM

## 2023-07-03 DIAGNOSIS — Z00.00 ANNUAL PHYSICAL EXAM: Primary | ICD-10-CM

## 2023-07-03 LAB
ALBUMIN SERPL BCP-MCNC: 4.1 G/DL (ref 3.5–5)
ALP SERPL-CCNC: 93 U/L (ref 46–116)
ALT SERPL W P-5'-P-CCNC: 56 U/L (ref 12–78)
ANION GAP SERPL CALCULATED.3IONS-SCNC: 0 MMOL/L
AST SERPL W P-5'-P-CCNC: 38 U/L (ref 5–45)
BILIRUB SERPL-MCNC: 0.78 MG/DL (ref 0.2–1)
BUN SERPL-MCNC: 14 MG/DL (ref 5–25)
CALCIUM SERPL-MCNC: 9.3 MG/DL (ref 8.3–10.1)
CHLORIDE SERPL-SCNC: 113 MMOL/L (ref 96–108)
CHOLEST SERPL-MCNC: 173 MG/DL
CO2 SERPL-SCNC: 27 MMOL/L (ref 21–32)
CREAT SERPL-MCNC: 1.18 MG/DL (ref 0.6–1.3)
EST. AVERAGE GLUCOSE BLD GHB EST-MCNC: 108 MG/DL
GFR SERPL CREATININE-BSD FRML MDRD: 47 ML/MIN/1.73SQ M
GLUCOSE P FAST SERPL-MCNC: 105 MG/DL (ref 65–99)
HBA1C MFR BLD: 5.4 %
HDLC SERPL-MCNC: 49 MG/DL
LDLC SERPL CALC-MCNC: 102 MG/DL (ref 0–100)
NONHDLC SERPL-MCNC: 124 MG/DL
POTASSIUM SERPL-SCNC: 4.3 MMOL/L (ref 3.5–5.3)
PROT SERPL-MCNC: 7.3 G/DL (ref 6.4–8.4)
SODIUM SERPL-SCNC: 140 MMOL/L (ref 135–147)
TRIGL SERPL-MCNC: 109 MG/DL

## 2023-07-03 PROCEDURE — 1101F PT FALLS ASSESS-DOCD LE1/YR: CPT | Performed by: NURSE PRACTITIONER

## 2023-07-03 PROCEDURE — 80053 COMPREHEN METABOLIC PANEL: CPT

## 2023-07-03 PROCEDURE — 80061 LIPID PANEL: CPT

## 2023-07-03 PROCEDURE — 99397 PER PM REEVAL EST PAT 65+ YR: CPT | Performed by: NURSE PRACTITIONER

## 2023-07-03 PROCEDURE — 3288F FALL RISK ASSESSMENT DOCD: CPT | Performed by: NURSE PRACTITIONER

## 2023-07-03 PROCEDURE — 1160F RVW MEDS BY RX/DR IN RCRD: CPT | Performed by: NURSE PRACTITIONER

## 2023-07-03 PROCEDURE — 3725F SCREEN DEPRESSION PERFORMED: CPT | Performed by: NURSE PRACTITIONER

## 2023-07-03 PROCEDURE — 1159F MED LIST DOCD IN RCRD: CPT | Performed by: NURSE PRACTITIONER

## 2023-07-03 PROCEDURE — 83036 HEMOGLOBIN GLYCOSYLATED A1C: CPT

## 2023-07-03 PROCEDURE — 36415 COLL VENOUS BLD VENIPUNCTURE: CPT

## 2023-07-03 NOTE — PROGRESS NOTES
ADULT ANNUAL [facility] PRIMARY CARE    NAME: Hema Zhu  AGE: 76 y.o. SEX: female  : 1955     DATE: 7/3/2023     Assessment and Plan:     Problem List Items Addressed This Visit        Other    Elevated hemoglobin A1c     Repeat HA1C ordered today. Relevant Orders    HEMOGLOBIN A1C W/ EAG ESTIMATION   Other Visit Diagnoses     Annual physical exam    -  Primary    Relevant Orders    HEMOGLOBIN A1C W/ EAG ESTIMATION    Comprehensive metabolic panel    Lipid panel    Encounter for screening mammogram for breast cancer        Relevant Orders    Mammo screening bilateral w 3d & cad    BMI 31.0-31.9,adult        Screening for cholesterol level        Relevant Orders    Comprehensive metabolic panel    Lipid panel    Colonoscopy refused        Mammogram declined              Immunizations and preventive care screenings were discussed with patient today. Appropriate education was printed on patient's after visit summary. Counseling:  · Exercise: the importance of regular exercise/physical activity was discussed. Recommend exercise 3-5 times per week for at least 30 minutes. BMI Counseling: Body mass index is 31.02 kg/m². The BMI is above normal. Exercise recommendations include moderate physical activity 150 minutes/week. Rationale for BMI follow-up plan is due to patient being overweight or obese. Has been losing weight with FFQTDK - has not been exercising though - reports she does not like to exercise and never has. Depression Screening and Follow-up Plan: Patient was screened for depression during today's encounter. They screened negative with a PHQ-2 score of 0. No follow-ups on file.      Chief Complaint:     Chief Complaint   Patient presents with   • Physical Exam   • Care Gap Request     Patient declined mammogram and colorectal screening       History of Present Illness:     Adult Annual Physical   Patient here for a comprehensive physical exam. The patient reports no problems. Diet and Physical Activity  · Diet/Nutrition: well balanced diet. · Exercise: no formal exercise. Depression Screening  PHQ-2/9 Depression Screening    Little interest or pleasure in doing things: 0 - not at all  Feeling down, depressed, or hopeless: 0 - not at all  PHQ-2 Score: 0  PHQ-2 Interpretation: Negative depression screen       General Health  · Sleep: sleeps well. · Hearing: normal - bilateral.  · Vision: no vision problems. · Dental: false teeth. /GYN Health  · Patient is: postmenopausal  ·      Review of Systems:     Review of Systems   Constitutional: Negative. HENT: Negative. Respiratory: Negative. Cardiovascular: Negative. Gastrointestinal: Negative. Neurological: Negative. All other systems reviewed and are negative.      Past Medical History:     Past Medical History:   Diagnosis Date   • Allergic Several years ago   • Arthritis Several years ago   • Bronchitis    • Elevated hemoglobin A1c     Pt states she does not take medication but is trying to lower with diet and exercise ( 7.1)   • Kidney stone Several years ago   • Lipoma of arm     LUE   • Obesity Several years ago   • Sphincter of Oddi dysfunction       Past Surgical History:     Past Surgical History:   Procedure Laterality Date   • BLADDER SUSPENSION     • BREAST LUMPECTOMY Left 2000    benign mass   • CHOLECYSTECTOMY     • COLONOSCOPY     • HYSTERECTOMY  1980   • OOPHORECTOMY Bilateral 1980   • ME EXC B9 LESION MRGN XCP SK TG T/A/L 3.1-4.0 CM Left 6/3/2022    Procedure: UPPER ARM MASS EXCISION;  Surgeon: Yaz Milner DO;  Location:  MAIN OR;  Service: General   • RECTAL PROLAPSE REPAIR        Social History:     Social History     Socioeconomic History   • Marital status: /Civil Union     Spouse name: None   • Number of children: None   • Years of education: None   • Highest education level: None   Occupational History   • None   Tobacco Use   • Smoking status: Former     Packs/day: 0.50     Years: 0.00     Total pack years: 0.00     Types: Cigarettes     Start date: 1972     Quit date: 1993     Years since quittin.1   • Smokeless tobacco: Never   Vaping Use   • Vaping Use: Never used   Substance and Sexual Activity   • Alcohol use: Never   • Drug use: Never   • Sexual activity: Not Currently     Partners: Male     Birth control/protection: None   Other Topics Concern   • None   Social History Narrative   • None     Social Determinants of Health     Financial Resource Strain: Not on file   Food Insecurity: Not on file   Transportation Needs: Not on file   Physical Activity: Not on file   Stress: Not on file   Social Connections: Not on file   Intimate Partner Violence: Not on file   Housing Stability: Not on file      Family History:     Family History   Problem Relation Age of Onset   • No Known Problems Mother    • Diabetes Father    • No Known Problems Sister    • No Known Problems Daughter    • Leukemia Maternal Grandmother    • No Known Problems Maternal Grandfather    • No Known Problems Paternal Grandmother    • No Known Problems Paternal Grandfather    • No Known Problems Maternal Aunt    • No Known Problems Maternal Aunt    • No Known Problems Paternal Aunt    • No Known Problems Paternal Aunt    • No Known Problems Sister    • No Known Problems Daughter    • BRCA2 Positive Neg Hx    • BRCA2 Negative Neg Hx    • BRCA1 Positive Neg Hx    • BRCA1 Negative Neg Hx    • BRCA 1/2 Neg Hx    • Ovarian cancer Neg Hx    • Endometrial cancer Neg Hx    • Colon cancer Neg Hx    • Breast cancer additional onset Neg Hx    • Breast cancer Neg Hx       Current Medications:     Current Outpatient Medications   Medication Sig Dispense Refill   • Semaglutide-Weight Management (WEGOVY) 2.4 MG/0.75ML Inject 0.75 mL (2.4 mg total) under the skin once a week 3 mL 5   • Sennosides (Laxative Pills) 25 MG TABS Take 2 tablets by mouth daily at bedtime       No current facility-administered medications for this visit. Allergies: Allergies   Allergen Reactions   • Cayenne - Food Allergy Shortness Of Breath     Pt states she coughs and then can't take breaths in   • Pineapple - Food Allergy Shortness Of Breath     Pt states she has a terrible cough and can't breath in   • Demerol [Meperidine] Itching   • Oxycodone Itching   • Propoxyphene Itching   • Tegaderm Alginate Ag Rope Rash and Swelling      Physical Exam:     /74 (BP Location: Left arm, Patient Position: Sitting, Cuff Size: Large)   Pulse 93   Ht 5' 2" (1.575 m)   Wt 76.9 kg (169 lb 9.6 oz)   SpO2 97%   BMI 31.02 kg/m²     Physical Exam  Vitals and nursing note reviewed. Constitutional:       General: She is not in acute distress. Appearance: Normal appearance. She is well-developed. HENT:      Head: Normocephalic and atraumatic. Eyes:      Conjunctiva/sclera: Conjunctivae normal.   Cardiovascular:      Rate and Rhythm: Normal rate and regular rhythm. Heart sounds: No murmur heard. No gallop. Pulmonary:      Effort: Pulmonary effort is normal. No respiratory distress. Breath sounds: Normal breath sounds. Abdominal:      Palpations: Abdomen is soft. Tenderness: There is no abdominal tenderness. Musculoskeletal:      Cervical back: Neck supple. Skin:     General: Skin is warm and dry. Neurological:      General: No focal deficit present. Mental Status: She is alert and oriented to person, place, and time. Mental status is at baseline. Psychiatric:         Mood and Affect: Mood normal.         Behavior: Behavior normal.         Thought Content:  Thought content normal.         Judgment: Judgment normal.          MARK Chatman  [facility] PRIMARY [facility]

## 2023-07-06 DIAGNOSIS — R94.4 DECREASED GLOMERULAR FILTRATION RATE (GFR): Primary | ICD-10-CM

## 2023-07-10 ENCOUNTER — TELEPHONE (OUTPATIENT)
Dept: FAMILY MEDICINE CLINIC | Facility: CLINIC | Age: 68
End: 2023-07-10

## 2023-07-10 NOTE — TELEPHONE ENCOUNTER
Patient called is reporting having pain that started last nigh in her R middle back area, it dose radiate to the front but dose not hurt in front as bad as it dose in her back. She has no fever, but is having nausea and some fatigue. She is asking if it could be kidney related and what should she be seen or any testing that needs ordered.

## 2023-07-11 DIAGNOSIS — R10.9 FLANK PAIN: Primary | ICD-10-CM

## 2023-07-11 NOTE — TELEPHONE ENCOUNTER
It could be a UTI but she would need to have her urine tested. I would say she would need to be seen so we can dip her urine. If she is unable to come in for an appointment, please let me know and I can place urine orders to be done at the lab.

## 2023-07-12 ENCOUNTER — APPOINTMENT (OUTPATIENT)
Dept: LAB | Facility: CLINIC | Age: 68
End: 2023-07-12
Payer: COMMERCIAL

## 2023-07-12 DIAGNOSIS — R94.4 DECREASED GLOMERULAR FILTRATION RATE (GFR): ICD-10-CM

## 2023-07-12 DIAGNOSIS — R10.9 FLANK PAIN: ICD-10-CM

## 2023-07-12 LAB
ALBUMIN SERPL BCP-MCNC: 3.9 G/DL (ref 3.5–5)
ALP SERPL-CCNC: 101 U/L (ref 46–116)
ALT SERPL W P-5'-P-CCNC: 46 U/L (ref 12–78)
ANION GAP SERPL CALCULATED.3IONS-SCNC: 4 MMOL/L
AST SERPL W P-5'-P-CCNC: 33 U/L (ref 5–45)
BACTERIA UR QL AUTO: ABNORMAL /HPF
BILIRUB SERPL-MCNC: 0.68 MG/DL (ref 0.2–1)
BILIRUB UR QL STRIP: NEGATIVE
BUN SERPL-MCNC: 19 MG/DL (ref 5–25)
CALCIUM SERPL-MCNC: 9.7 MG/DL (ref 8.3–10.1)
CAOX CRY URNS QL MICRO: ABNORMAL /HPF
CHLORIDE SERPL-SCNC: 109 MMOL/L (ref 96–108)
CLARITY UR: CLEAR
CO2 SERPL-SCNC: 27 MMOL/L (ref 21–32)
COLOR UR: ABNORMAL
CREAT SERPL-MCNC: 1.17 MG/DL (ref 0.6–1.3)
GFR SERPL CREATININE-BSD FRML MDRD: 47 ML/MIN/1.73SQ M
GLUCOSE P FAST SERPL-MCNC: 104 MG/DL (ref 65–99)
GLUCOSE UR STRIP-MCNC: NEGATIVE MG/DL
HGB UR QL STRIP.AUTO: NEGATIVE
KETONES UR STRIP-MCNC: NEGATIVE MG/DL
LEUKOCYTE ESTERASE UR QL STRIP: ABNORMAL
MUCOUS THREADS UR QL AUTO: ABNORMAL
NITRITE UR QL STRIP: NEGATIVE
NON-SQ EPI CELLS URNS QL MICRO: ABNORMAL /HPF
PH UR STRIP.AUTO: 5.5 [PH]
POTASSIUM SERPL-SCNC: 4.1 MMOL/L (ref 3.5–5.3)
PROT SERPL-MCNC: 7.2 G/DL (ref 6.4–8.4)
PROT UR STRIP-MCNC: NEGATIVE MG/DL
RBC #/AREA URNS AUTO: ABNORMAL /HPF
SODIUM SERPL-SCNC: 140 MMOL/L (ref 135–147)
SP GR UR STRIP.AUTO: 1.02 (ref 1–1.03)
UROBILINOGEN UR STRIP-ACNC: <2 MG/DL
WBC #/AREA URNS AUTO: ABNORMAL /HPF

## 2023-07-12 PROCEDURE — 87086 URINE CULTURE/COLONY COUNT: CPT

## 2023-07-12 PROCEDURE — 81001 URINALYSIS AUTO W/SCOPE: CPT | Performed by: NURSE PRACTITIONER

## 2023-07-12 PROCEDURE — 80053 COMPREHEN METABOLIC PANEL: CPT

## 2023-07-12 PROCEDURE — 36415 COLL VENOUS BLD VENIPUNCTURE: CPT

## 2023-07-13 ENCOUNTER — PATIENT MESSAGE (OUTPATIENT)
Dept: FAMILY MEDICINE CLINIC | Facility: CLINIC | Age: 68
End: 2023-07-13

## 2023-07-13 DIAGNOSIS — N30.00 ACUTE CYSTITIS WITHOUT HEMATURIA: Primary | ICD-10-CM

## 2023-07-13 LAB — BACTERIA UR CULT: NORMAL

## 2023-07-13 RX ORDER — CIPROFLOXACIN 250 MG/1
250 TABLET, FILM COATED ORAL EVERY 12 HOURS SCHEDULED
Qty: 14 TABLET | Refills: 0 | Status: SHIPPED | OUTPATIENT
Start: 2023-07-13 | End: 2023-07-20

## 2023-07-13 NOTE — PATIENT COMMUNICATION
I spoke with the patient. Classic uti sx. Started cipro as she has flank pain and chills.   Will adjust if needed based on culture            I sent robaxin a muscle relaxant in for her back

## 2023-07-13 NOTE — TELEPHONE ENCOUNTER
From: Live Oconnell  To: Ulysses Kern  Sent: 7/13/2023 11:30 AM EDT  Subject: Last tests    I was wondering if you had a chance to read the test results from the last tests. I am so very uncomfortable and I hurt. Please get back to me. Thank you.

## 2023-07-14 ENCOUNTER — HOSPITAL ENCOUNTER (OUTPATIENT)
Dept: RADIOLOGY | Facility: CLINIC | Age: 68
End: 2023-07-14
Payer: COMMERCIAL

## 2023-07-14 VITALS — BODY MASS INDEX: 31.21 KG/M2 | HEIGHT: 62 IN | WEIGHT: 169.6 LBS

## 2023-07-14 DIAGNOSIS — Z12.31 ENCOUNTER FOR SCREENING MAMMOGRAM FOR BREAST CANCER: ICD-10-CM

## 2023-07-14 PROCEDURE — 77067 SCR MAMMO BI INCL CAD: CPT

## 2023-07-14 PROCEDURE — 77063 BREAST TOMOSYNTHESIS BI: CPT

## 2023-07-17 DIAGNOSIS — M54.40 ACUTE RIGHT-SIDED LOW BACK PAIN WITH SCIATICA, SCIATICA LATERALITY UNSPECIFIED: Primary | ICD-10-CM

## 2023-07-17 RX ORDER — METHOCARBAMOL 500 MG/1
500 TABLET, FILM COATED ORAL 3 TIMES DAILY PRN
Qty: 30 TABLET | Refills: 0 | Status: SHIPPED | OUTPATIENT
Start: 2023-07-17

## 2023-07-20 ENCOUNTER — OFFICE VISIT (OUTPATIENT)
Dept: FAMILY MEDICINE CLINIC | Facility: CLINIC | Age: 68
End: 2023-07-20
Payer: COMMERCIAL

## 2023-07-20 VITALS
TEMPERATURE: 98.2 F | WEIGHT: 169 LBS | OXYGEN SATURATION: 96 % | HEIGHT: 62 IN | BODY MASS INDEX: 31.1 KG/M2 | SYSTOLIC BLOOD PRESSURE: 137 MMHG | DIASTOLIC BLOOD PRESSURE: 91 MMHG | HEART RATE: 81 BPM

## 2023-07-20 DIAGNOSIS — R10.11 RUQ PAIN: Primary | ICD-10-CM

## 2023-07-20 PROCEDURE — 99214 OFFICE O/P EST MOD 30 MIN: CPT | Performed by: FAMILY MEDICINE

## 2023-07-20 RX ORDER — ACETAMINOPHEN AND CODEINE PHOSPHATE 300; 30 MG/1; MG/1
1 TABLET ORAL EVERY 4 HOURS PRN
Qty: 30 TABLET | Refills: 0 | Status: SHIPPED | OUTPATIENT
Start: 2023-07-20

## 2023-07-20 RX ORDER — TOBRAMYCIN 3 MG/ML
SOLUTION/ DROPS OPHTHALMIC
COMMUNITY

## 2023-07-20 NOTE — PROGRESS NOTES
Assessment/Plan:       1. RUQ pain  Comments:  check labs and ultrasound  start tylenol #3  Orders:  -     US abdomen limited; Future; Expected date: 07/20/2023  -     CBC and differential; Future  -     Comprehensive metabolic panel; Future  -     acetaminophen-codeine (TYLENOL/CODEINE #3) 300-30 MG per tablet; Take 1 tablet by mouth every 4 (four) hours as needed for moderate pain          Subjective:      Patient ID: Isadora Schneider is a 76 y.o. female. The patient had a UTI last week treated with cipro. Those sx resolved. No blood on dip. She now has right back and ruq pain. She has a hx of Sphincter of Oddi dysfunction. No fever or systemic sx. Back Pain        The following portions of the patient's history were reviewed and updated as appropriate: allergies, current medications, past family history, past medical history, past social history, past surgical history, and problem list.    Review of Systems   Respiratory: Negative. Cardiovascular: Negative. Gastrointestinal: Negative. Musculoskeletal: Positive for back pain. Objective:      /91 (BP Location: Left arm, Patient Position: Sitting, Cuff Size: Standard)   Pulse 81   Temp 98.2 °F (36.8 °C)   Ht 5' 2" (1.575 m)   Wt 76.7 kg (169 lb)   SpO2 96%   BMI 30.91 kg/m²          Physical Exam  Vitals and nursing note reviewed. Constitutional:       Appearance: Normal appearance. HENT:      Head: Normocephalic and atraumatic. Nose: Nose normal.      Mouth/Throat:      Mouth: Mucous membranes are moist.   Eyes:      Extraocular Movements: Extraocular movements intact. Pupils: Pupils are equal, round, and reactive to light. Cardiovascular:      Rate and Rhythm: Normal rate and regular rhythm. Pulses: Normal pulses. Pulmonary:      Effort: Pulmonary effort is normal.      Breath sounds: Normal breath sounds. Abdominal:      General: Bowel sounds are normal.      Palpations: Abdomen is soft. Musculoskeletal:      Cervical back: Normal range of motion. Skin:     General: Skin is warm and dry. Capillary Refill: Capillary refill takes less than 2 seconds. Neurological:      General: No focal deficit present. Mental Status: She is alert.    Psychiatric:         Mood and Affect: Mood normal.

## 2023-07-21 ENCOUNTER — LAB (OUTPATIENT)
Dept: LAB | Facility: CLINIC | Age: 68
End: 2023-07-21
Payer: COMMERCIAL

## 2023-07-21 DIAGNOSIS — R10.11 RUQ PAIN: ICD-10-CM

## 2023-07-21 LAB
ALBUMIN SERPL BCP-MCNC: 3.8 G/DL (ref 3.5–5)
ALP SERPL-CCNC: 91 U/L (ref 46–116)
ALT SERPL W P-5'-P-CCNC: 59 U/L (ref 12–78)
ANION GAP SERPL CALCULATED.3IONS-SCNC: 6 MMOL/L
AST SERPL W P-5'-P-CCNC: 50 U/L (ref 5–45)
BASOPHILS # BLD AUTO: 0.05 THOUSANDS/ÂΜL (ref 0–0.1)
BASOPHILS NFR BLD AUTO: 1 % (ref 0–1)
BILIRUB SERPL-MCNC: 0.67 MG/DL (ref 0.2–1)
BUN SERPL-MCNC: 18 MG/DL (ref 5–25)
CALCIUM SERPL-MCNC: 9.2 MG/DL (ref 8.3–10.1)
CHLORIDE SERPL-SCNC: 111 MMOL/L (ref 96–108)
CO2 SERPL-SCNC: 24 MMOL/L (ref 21–32)
CREAT SERPL-MCNC: 1.04 MG/DL (ref 0.6–1.3)
EOSINOPHIL # BLD AUTO: 0.14 THOUSAND/ÂΜL (ref 0–0.61)
EOSINOPHIL NFR BLD AUTO: 2 % (ref 0–6)
ERYTHROCYTE [DISTWIDTH] IN BLOOD BY AUTOMATED COUNT: 13 % (ref 11.6–15.1)
GFR SERPL CREATININE-BSD FRML MDRD: 55 ML/MIN/1.73SQ M
GLUCOSE P FAST SERPL-MCNC: 122 MG/DL (ref 65–99)
HCT VFR BLD AUTO: 44 % (ref 34.8–46.1)
HGB BLD-MCNC: 15 G/DL (ref 11.5–15.4)
IMM GRANULOCYTES # BLD AUTO: 0.03 THOUSAND/UL (ref 0–0.2)
IMM GRANULOCYTES NFR BLD AUTO: 0 % (ref 0–2)
LYMPHOCYTES # BLD AUTO: 2.71 THOUSANDS/ÂΜL (ref 0.6–4.47)
LYMPHOCYTES NFR BLD AUTO: 35 % (ref 14–44)
MCH RBC QN AUTO: 30.3 PG (ref 26.8–34.3)
MCHC RBC AUTO-ENTMCNC: 34.1 G/DL (ref 31.4–37.4)
MCV RBC AUTO: 89 FL (ref 82–98)
MONOCYTES # BLD AUTO: 0.47 THOUSAND/ÂΜL (ref 0.17–1.22)
MONOCYTES NFR BLD AUTO: 6 % (ref 4–12)
NEUTROPHILS # BLD AUTO: 4.45 THOUSANDS/ÂΜL (ref 1.85–7.62)
NEUTS SEG NFR BLD AUTO: 56 % (ref 43–75)
NRBC BLD AUTO-RTO: 0 /100 WBCS
PLATELET # BLD AUTO: 188 THOUSANDS/UL (ref 149–390)
PMV BLD AUTO: 11 FL (ref 8.9–12.7)
POTASSIUM SERPL-SCNC: 4.1 MMOL/L (ref 3.5–5.3)
PROT SERPL-MCNC: 7.1 G/DL (ref 6.4–8.4)
RBC # BLD AUTO: 4.95 MILLION/UL (ref 3.81–5.12)
SODIUM SERPL-SCNC: 141 MMOL/L (ref 135–147)
WBC # BLD AUTO: 7.85 THOUSAND/UL (ref 4.31–10.16)

## 2023-07-21 PROCEDURE — 85025 COMPLETE CBC W/AUTO DIFF WBC: CPT

## 2023-07-21 PROCEDURE — 36415 COLL VENOUS BLD VENIPUNCTURE: CPT

## 2023-07-21 PROCEDURE — 80053 COMPREHEN METABOLIC PANEL: CPT

## 2023-07-23 ENCOUNTER — HOSPITAL ENCOUNTER (OUTPATIENT)
Dept: ULTRASOUND IMAGING | Facility: HOSPITAL | Age: 68
Discharge: HOME | End: 2023-07-23
Payer: COMMERCIAL

## 2023-07-23 DIAGNOSIS — R10.11 RUQ PAIN: ICD-10-CM

## 2023-07-23 PROCEDURE — 76705 ECHO EXAM OF ABDOMEN: CPT

## 2023-08-01 ENCOUNTER — TELEPHONE (OUTPATIENT)
Dept: FAMILY MEDICINE CLINIC | Facility: CLINIC | Age: 68
End: 2023-08-01

## 2023-08-01 NOTE — TELEPHONE ENCOUNTER
Patient requested we obtain records from 5-7 years ago for her Sphincter of OD Dysfunction from [facility], [facility], [address]. I had her complete a medical records request and faxed it to [phone]. Request scanned in to media.

## 2023-08-05 ENCOUNTER — PATIENT MESSAGE (OUTPATIENT)
Dept: FAMILY MEDICINE CLINIC | Facility: CLINIC | Age: 68
End: 2023-08-05

## 2023-08-05 ENCOUNTER — TELEPHONE (OUTPATIENT)
Dept: FAMILY MEDICINE CLINIC | Facility: CLINIC | Age: 68
End: 2023-08-05

## 2023-08-05 NOTE — TELEPHONE ENCOUNTER
1. Obesity   • Baseline Weight: 180 lb (01/2023)  • Most recent Weight: 169 lb (as of 07/20/2023 appointment)  • Weight loss:   ? Stage 2 - BMI > 25 with at least one severe complication or requires more aggressive weight loss for effective treatment - add pharmacotherapy with BMI > 27, consider bariatric surgery with BMI > 35 per the AACE/ACE guidelines. •  Patient's weight-related disease of complication: diabetes  • Medication options/discussion  ? Tried and failed contrave, phentermine, and Prem. Patient would benefit from GLP-1 agonist for weight loss and due to hx DM (past A1c of 7.8% (6/6/22) and 6.8% (10/6/22)). No hx of MEN2 or MTC. States she did have pancrease injury during another surgery for her Sphincter of Oddi dysfunction where the surgeon accidentally hit the pancrease, but no hx of spontaneous or drug induced pancreatitis. Medication Regimen:     Doing well on Wegovy at this time and tolerating it at the 2.4 mg dose. Will continue with this current regimen. Reports no recent side effects at this time. A 5% weight loss goal is recommended at 12 weeks per AACE/ACE guidelines. If goal is not reached, medication should be discontinued     2. Medication Reconciliation:   • Medication list reviewed with pt at 07/20/2023 visit. • Medication list updated to reflect medications pt is currently taking     3. BMI Counseling    Monitoring: weight on home scale     Referrals placed at this visit: none     Follow-up: 3 months from renewal of medication. Subjective         1. Previous weight loss medication  • Phentermine 30 mg - Has been on for at least 3 months. Lost ~ 10 -12 pounds on phentermine. Since the holidays gained about 5 pounds. • Contrave- made her sick (nasuea)   • Go -Lo OTC  • Prem - didn't work and side effects (oily stools)   • Hyrdroxycut   • Apple cider vinegar   • Metformin -did not tolerate; felt loopy / dizzy / loss orientation       2.  Lifestyle: • Water: Mixes water plus lemon plus apple cider (1 TBLS) (32 oz)   • Diet  ? Doesn't eat fruit. Eats more meat/protein and vegetables. Low carbs. Avoid pasta and bread. Occasional potato chips and cheese. Once in awhile cake but not often. • Physical Activity: Not physically active. Constantly walking in house. Reports she does not like to do focused exercise. Objective         ASCVD Risk:   The 10-year ASCVD risk score (Joon SEALS, et al., 2019) is: 8.5%    Values used to calculate the score:      Age: 76 years      Sex: Female      Is Non- : No      Diabetic: No      Tobacco smoker: No      Systolic Blood Pressure: 475 mmHg      Is BP treated: No      HDL Cholesterol: 49 mg/dL      Total Cholesterol: 173 mg/dL    Vitals:   Wt Readings from Last 3 Encounters:   07/20/23 76.7 kg (169 lb)   07/14/23 76.9 kg (169 lb 9.6 oz)   07/03/23 76.9 kg (169 lb 9.6 oz)              Wt Readings from Last 5 Encounters:

## 2023-08-06 ENCOUNTER — TELEPHONE (OUTPATIENT)
Dept: FAMILY MEDICINE CLINIC | Facility: CLINIC | Age: 68
End: 2023-08-06

## 2023-08-07 PROBLEM — K76.0 HEPATIC STEATOSIS: Status: ACTIVE | Noted: 2023-08-07

## 2023-08-09 NOTE — TELEPHONE ENCOUNTER
Nothing yet. It took me several days to get this to go through the fax. I would give it another week before we investigate.

## 2023-08-09 NOTE — TELEPHONE ENCOUNTER
Approval letter received for Ariadna Gutierrez. 08/09/2023 to 08/09/2024    Letter scanned into chart.

## 2023-08-15 DIAGNOSIS — R10.11 RUQ PAIN: ICD-10-CM

## 2023-08-15 RX ORDER — ACETAMINOPHEN AND CODEINE PHOSPHATE 300; 30 MG/1; MG/1
1 TABLET ORAL EVERY 4 HOURS PRN
Qty: 30 TABLET | Refills: 0 | Status: SHIPPED | OUTPATIENT
Start: 2023-08-15

## 2023-08-17 ENCOUNTER — TELEPHONE (OUTPATIENT)
Dept: FAMILY MEDICINE CLINIC | Facility: CLINIC | Age: 68
End: 2023-08-17

## 2023-08-17 NOTE — TELEPHONE ENCOUNTER
Prior auth submitted to [employer] plan for approval.  Key: [id number]  Awaiting approval/denial response.     Med: acetaminophen-codeine (TYLENOL/CODEINE #3) 300-30 MG per tablet

## 2023-09-25 DIAGNOSIS — R10.11 RUQ PAIN: ICD-10-CM

## 2023-09-25 RX ORDER — ACETAMINOPHEN AND CODEINE PHOSPHATE 300; 30 MG/1; MG/1
1 TABLET ORAL EVERY 4 HOURS PRN
Qty: 30 TABLET | Refills: 0 | Status: SHIPPED | OUTPATIENT
Start: 2023-09-25

## 2023-10-02 ENCOUNTER — TELEPHONE (OUTPATIENT)
Dept: FAMILY MEDICINE CLINIC | Facility: CLINIC | Age: 68
End: 2023-10-02

## 2023-10-09 RX ORDER — SEMAGLUTIDE 2.4 MG/.75ML
0.75 INJECTION, SOLUTION SUBCUTANEOUS WEEKLY
Qty: 3 ML | Refills: 0 | Status: SHIPPED | OUTPATIENT
Start: 2023-10-09

## 2023-11-05 RX ORDER — SEMAGLUTIDE 2.4 MG/.75ML
2.4 INJECTION, SOLUTION SUBCUTANEOUS WEEKLY
Qty: 3 ML | Refills: 0 | Status: SHIPPED | OUTPATIENT
Start: 2023-11-05

## 2023-11-13 DIAGNOSIS — R10.11 RUQ PAIN: ICD-10-CM

## 2023-11-13 RX ORDER — ACETAMINOPHEN AND CODEINE PHOSPHATE 300; 30 MG/1; MG/1
1 TABLET ORAL EVERY 4 HOURS PRN
Qty: 20 TABLET | Refills: 0 | Status: SHIPPED | OUTPATIENT
Start: 2023-11-13

## 2023-11-13 NOTE — TELEPHONE ENCOUNTER
I can send over a refill for her but can we ask if this is for her right upper quadrant pain that she had in July, or is this for low back pain? Since this is her fourth refill since July, she would need to come for a 6 month follow-up - can we schedule her in December or January? Thanks.

## 2023-11-27 ENCOUNTER — HOSPITAL ENCOUNTER (EMERGENCY)
Facility: HOSPITAL | Age: 68
Discharge: HOME | End: 2023-11-27
Attending: EMERGENCY MEDICINE | Admitting: EMERGENCY MEDICINE
Payer: COMMERCIAL

## 2023-11-27 ENCOUNTER — APPOINTMENT (EMERGENCY)
Dept: RADIOLOGY | Facility: HOSPITAL | Age: 68
End: 2023-11-27
Payer: COMMERCIAL

## 2023-11-27 VITALS
TEMPERATURE: 97.9 F | RESPIRATION RATE: 16 BRPM | OXYGEN SATURATION: 96 % | WEIGHT: 154.76 LBS | HEART RATE: 106 BPM | BODY MASS INDEX: 28.48 KG/M2 | HEIGHT: 62 IN | DIASTOLIC BLOOD PRESSURE: 85 MMHG | SYSTOLIC BLOOD PRESSURE: 118 MMHG

## 2023-11-27 DIAGNOSIS — J04.10 ACUTE VIRAL TRACHEITIS: ICD-10-CM

## 2023-11-27 DIAGNOSIS — J06.9 VIRAL URI WITH COUGH: Primary | ICD-10-CM

## 2023-11-27 PROCEDURE — 71045 X-RAY EXAM CHEST 1 VIEW: CPT

## 2023-11-27 PROCEDURE — 99284 EMERGENCY DEPT VISIT MOD MDM: CPT | Performed by: EMERGENCY MEDICINE

## 2023-11-27 PROCEDURE — 99283 EMERGENCY DEPT VISIT LOW MDM: CPT

## 2023-11-27 PROCEDURE — 0241U HB NFCT DS VIR RESP RNA 4 TRGT: CPT | Performed by: EMERGENCY MEDICINE

## 2023-11-27 RX ORDER — BENZONATATE 100 MG/1
100 CAPSULE ORAL 3 TIMES DAILY PRN
Qty: 21 CAPSULE | Refills: 0 | Status: SHIPPED | OUTPATIENT
Start: 2023-11-27

## 2023-11-27 RX ADMIN — DEXAMETHASONE SODIUM PHOSPHATE 10 MG: 10 INJECTION, SOLUTION INTRAMUSCULAR; INTRAVENOUS at 05:18

## 2023-11-27 NOTE — ED PROVIDER NOTES
History  Chief Complaint   Patient presents with    Cold Like Symptoms     Cough, ear fullness and nasal drainage for 4 days        History provided by:  Medical records and patient  URI  Presenting symptoms: cough, rhinorrhea and sore throat    Presenting symptoms: no ear pain, no fatigue and no fever    Severity:  Moderate  Onset quality:  Gradual  Duration:  4 days  Timing:  Constant  Progression:  Unchanged  Chronicity:  New  Relieved by:  Nothing  Worsened by:  Nothing  Ineffective treatments:  None tried  Associated symptoms: no arthralgias, no headaches, no sinus pain and no wheezing    Risk factors: sick contacts        Prior to Admission Medications   Prescriptions Last Dose Informant Patient Reported? Taking?    Semaglutide-Weight Management [facility]) 2.4 MG/0.75ML   No No   Sig: Inject 0.75 mL (2.4 mg total) under the skin once a week Inject under the skin   Sennosides (Laxative Pills) 25 MG TABS Not Taking  Yes No   Sig: Take 2 tablets by mouth daily at bedtime   Patient not taking: Reported on 11/27/2023   acetaminophen-codeine (TYLENOL/CODEINE #3) 300-30 MG per tablet Not Taking  No No   Sig: Take 1 tablet by mouth every 4 (four) hours as needed for moderate pain   Patient not taking: Reported on 11/27/2023   methocarbamol (ROBAXIN) 500 mg tablet Not Taking  No No   Sig: Take 1 tablet (500 mg total) by mouth 3 (three) times a day as needed for muscle spasms   Patient not taking: Reported on 11/27/2023      Facility-Administered Medications: None       Past Medical History:   Diagnosis Date    Allergic Several years ago    Arthritis Several years ago    Bronchitis     Elevated hemoglobin A1c     Pt states she does not take medication but is trying to lower with diet and exercise ( 7.1)    Kidney stone Several years ago    Lipoma of arm     LUE    Obesity Several years ago    Sphincter of Oddi dysfunction        Past Surgical History:   Procedure Laterality Date    BLADDER SUSPENSION      BREAST LUMPECTOMY Left     benign mass    CHOLECYSTECTOMY      COLONOSCOPY      HYSTERECTOMY      OOPHORECTOMY Bilateral     PA EXC B9 LESION MRGN XCP SK TG T/A/L 3.1-4.0 CM Left 6/3/2022    Procedure: UPPER ARM MASS EXCISION;  Surgeon: Norman Bah DO;  Location: [facility];  Service: General    RECTAL PROLAPSE REPAIR         Family History   Problem Relation Age of Onset    No Known Problems Mother     Diabetes Father     No Known Problems Sister     No Known Problems Daughter     Leukemia Maternal Grandmother     No Known Problems Maternal Grandfather     No Known Problems Paternal Grandmother     No Known Problems Paternal Grandfather     No Known Problems Maternal Aunt     No Known Problems Maternal Aunt     No Known Problems Paternal Aunt     No Known Problems Paternal Aunt     No Known Problems Sister     No Known Problems Daughter     BRCA2 Positive Neg Hx     BRCA2 Negative Neg Hx     BRCA1 Positive Neg Hx     BRCA1 Negative Neg Hx     BRCA 1/2 Neg Hx     Ovarian cancer Neg Hx     Endometrial cancer Neg Hx     Colon cancer Neg Hx     Breast cancer additional onset Neg Hx     Breast cancer Neg Hx      I have reviewed and agree with the history as documented. E-Cigarette/Vaping    E-Cigarette Use Never User      E-Cigarette/Vaping Substances    Nicotine No     THC No     CBD No     Flavoring No     Other No     Unknown No      Social History     Tobacco Use    Smoking status: Former     Packs/day: 0.50     Years: 0.00     Total pack years: 0.00     Types: Cigarettes     Start date: 1972     Quit date: 1993     Years since quittin.5    Smokeless tobacco: Never   Vaping Use    Vaping Use: Never used   Substance Use Topics    Alcohol use: Never    Drug use: Never       Review of Systems   Constitutional:  Negative for chills, fatigue and fever. HENT:  Positive for rhinorrhea, sinus pressure, sore throat and voice change.  Negative for ear discharge, ear pain, sinus pain, tinnitus and trouble swallowing. Eyes:  Negative for pain and visual disturbance. Respiratory:  Positive for cough. Negative for chest tightness, shortness of breath, wheezing and stridor. Cardiovascular:  Negative for chest pain and palpitations. Gastrointestinal:  Negative for abdominal pain, diarrhea, nausea and vomiting. Endocrine: Negative for polydipsia, polyphagia and polyuria. Genitourinary:  Negative for difficulty urinating, dysuria, flank pain and hematuria. Musculoskeletal:  Negative for arthralgias and back pain. Skin:  Negative for color change and rash. Allergic/Immunologic: Negative for immunocompromised state. Neurological:  Negative for dizziness, seizures, syncope, weakness and headaches. Psychiatric/Behavioral:  Negative for confusion and self-injury. The patient is not nervous/anxious. All other systems reviewed and are negative. Physical Exam  Physical Exam  Vitals and nursing note reviewed. Constitutional:       General: She is not in acute distress. Appearance: Normal appearance. She is not ill-appearing, toxic-appearing or diaphoretic. HENT:      Head: Normocephalic and atraumatic. Comments: Mildly hoarse voice     Right Ear: Tympanic membrane, ear canal and external ear normal. There is no impacted cerumen. Left Ear: Tympanic membrane, ear canal and external ear normal. There is no impacted cerumen. Nose: Congestion and rhinorrhea present. Mouth/Throat:      Mouth: Mucous membranes are moist.      Pharynx: Oropharynx is clear. Posterior oropharyngeal erythema present. No oropharyngeal exudate. Comments: Mild soft palate erythema and cobblestoning of the posterior oropharynx, no swelling, no exudates, no ulcers, no trismus. Eyes:      General:         Right eye: No discharge. Left eye: No discharge. Cardiovascular:      Rate and Rhythm: Normal rate and regular rhythm. Pulses: Normal pulses. Heart sounds: Normal heart sounds.  No murmur heard. No gallop. Pulmonary:      Effort: Pulmonary effort is normal. No respiratory distress. Breath sounds: Normal breath sounds. No stridor. No wheezing, rhonchi or rales. Chest:      Chest wall: No tenderness. Abdominal:      General: Bowel sounds are normal. There is no distension. Palpations: Abdomen is soft. There is no mass. Tenderness: There is no abdominal tenderness. There is no right CVA tenderness, left CVA tenderness, guarding or rebound. Hernia: No hernia is present. Musculoskeletal:         General: Normal range of motion. Cervical back: Normal range of motion and neck supple. No rigidity or tenderness. Lymphadenopathy:      Cervical: No cervical adenopathy. Skin:     General: Skin is warm and dry. Capillary Refill: Capillary refill takes less than 2 seconds. Neurological:      General: No focal deficit present. Mental Status: She is alert and oriented to person, place, and time. Cranial Nerves: No cranial nerve deficit. Sensory: No sensory deficit. Motor: No weakness. Coordination: Coordination normal.      Gait: Gait normal.      Deep Tendon Reflexes: Reflexes normal.   Psychiatric:         Mood and Affect: Mood normal.         Behavior: Behavior normal.         Thought Content:  Thought content normal.         Judgment: Judgment normal.         Vital Signs  ED Triage Vitals [11/27/23 0501]   Temperature Pulse Respirations Blood Pressure SpO2   97.9 °F (36.6 °C) (!) 106 16 118/85 96 %      Temp Source Heart Rate Source Patient Position - Orthostatic VS BP Location FiO2 (%)   Temporal Monitor Sitting Right arm --      Pain Score       8           Vitals:    11/27/23 0501   BP: 118/85   Pulse: (!) 106   Patient Position - Orthostatic VS: Sitting         Visual Acuity      ED Medications  Medications   dexamethasone oral liquid 10 mg 1 mL (10 mg Oral Given 11/27/23 0518)       Diagnostic Studies  Results Reviewed Procedure Component Value Units Date/Time    FLU/RSV/COVID - if FLU/RSV clinically relevant [[id number]] Collected: 11/27/23 0515    Lab Status: In process Specimen: Nares from Nose Updated: 11/27/23 0528                   XR chest 1 view portable    (Results Pending)              Procedures  Procedures         ED Course                               SBIRT 20yo+      Flowsheet Row Most Recent Value   Initial Alcohol Screen: US AUDIT-C     1. How often do you have a drink containing alcohol? 0 Filed at: 11/27/2023 0501   2. How many drinks containing alcohol do you have on a typical day you are drinking? 0 Filed at: 11/27/2023 0501   3a. Male UNDER 65: How often do you have five or more drinks on one occasion? 0 Filed at: 11/27/2023 0501   3b. FEMALE Any Age, or MALE 65+: How often do you have 4 or more drinks on one occassion? 0 Filed at: 11/27/2023 0501   Audit-C Score 0 Filed at: 11/27/2023 0501   SEAN: How many times in the past year have you. .. Used an illegal drug or used a prescription medication for non-medical reasons? Never Filed at: 11/27/2023 0501                      Medical Decision Making  0503: Patient appears well, vital signs reviewed. Concern for viral URI and possible mild tracheitis due to coughing. Plan to complete viral respiratory panel and chest x-ray. I will give Decadron for her discomfort. Amount and/or Complexity of Data Reviewed  Radiology: ordered and independent interpretation performed.      Details: Chest x-ray no acute pathology             Disposition  Final diagnoses:   Viral URI with cough   Acute viral tracheitis     Time reflects when diagnosis was documented in both MDM as applicable and the Disposition within this note       Time User Action Codes Description Comment    11/27/2023  5:39 AM Alexa Alvarado Add [J06.9] Viral URI with cough     11/27/2023  5:40 AM Alexa Alvarado Add [J04.10] Acute viral tracheitis           ED Disposition       ED Disposition Discharge    Condition   Stable    Date/Time   Mon Nov 27, 2023 [address] discharge to home/self care. Follow-up Information       Follow up With Specialties Details Why Contact Info    Mariluz Pope, [address], Nurse Practitioner Schedule an appointment as soon as possible for a visit   [address] [place] [address]  [phone]              Patient's Medications   Discharge Prescriptions    BENZONATATE (TESSALON PERLES) 100 MG CAPSULE    Take 1 capsule (100 mg total) by mouth 3 (three) times a day as needed for cough       Start Date: 11/27/2023End Date: --       Order Dose: 100 mg       Quantity: 21 capsule    Refills: 0       No discharge procedures on file.     PDMP Review         Value Time User    PDMP Reviewed  Yes 11/13/2023  1:37 PM Mariluz Pope, [address] [facility]            ED Provider  Electronically Signed by             Froylan Live MD  11/27/23 1054

## 2023-11-28 ENCOUNTER — OFFICE VISIT (OUTPATIENT)
Dept: FAMILY MEDICINE CLINIC | Facility: CLINIC | Age: 68
End: 2023-11-28
Payer: COMMERCIAL

## 2023-11-28 VITALS
DIASTOLIC BLOOD PRESSURE: 62 MMHG | HEART RATE: 107 BPM | HEIGHT: 62 IN | BODY MASS INDEX: 27.94 KG/M2 | OXYGEN SATURATION: 98 % | WEIGHT: 151.8 LBS | SYSTOLIC BLOOD PRESSURE: 134 MMHG

## 2023-11-28 DIAGNOSIS — J04.10 TRACHEITIS: ICD-10-CM

## 2023-11-28 DIAGNOSIS — R05.2 SUBACUTE COUGH: Primary | ICD-10-CM

## 2023-11-28 DIAGNOSIS — R09.81 NASAL CONGESTION: ICD-10-CM

## 2023-11-28 DIAGNOSIS — R09.89 CHEST CONGESTION: ICD-10-CM

## 2023-11-28 PROCEDURE — 99213 OFFICE O/P EST LOW 20 MIN: CPT | Performed by: NURSE PRACTITIONER

## 2023-11-28 RX ORDER — DEXAMETHASONE 0.5 MG/5ML
1 SOLUTION ORAL DAILY
Qty: 50 ML | Refills: 0 | Status: SHIPPED | OUTPATIENT
Start: 2023-11-28 | End: 2023-12-03

## 2023-11-28 RX ORDER — CODEINE PHOSPHATE/GUAIFENESIN 10-100MG/5
5 LIQUID (ML) ORAL 3 TIMES DAILY PRN
Qty: 118 ML | Refills: 1 | Status: SHIPPED | OUTPATIENT
Start: 2023-11-28

## 2023-11-28 NOTE — PROGRESS NOTES
Pt. Seen initially by MARK student. Assessed and reviewed this patient with the MARK student thereafter, see plan. Name: Antonio Powers      :       MRN: [id number]  Encounter Provider: MARK Alvarez  Encounter Date: 2023   Encounter department: [address] [place] [address] PRIMARY CARE    Assessment & Plan     1. Subacute cough  Assessment & Plan:  Cough started on 23. Start guaifenesin-codeine, take 5 mL PO TID PRN for cough. Start dexamethasone, take 10 mL PO daily for 5 days. Encouraged hydration and rest.     Orders:  -     dexamethasone 0.5 mg/5 mL solution; Take 10 mL (1 mg total) by mouth daily for 5 days  -     guaifenesin-codeine (GUAIFENESIN AC) 100-10 MG/5ML liquid; Take 5 mL by mouth 3 (three) times a day as needed for cough    2. Nasal congestion  -     dexamethasone 0.5 mg/5 mL solution; Take 10 mL (1 mg total) by mouth daily for 5 days  -     guaifenesin-codeine (GUAIFENESIN AC) 100-10 MG/5ML liquid; Take 5 mL by mouth 3 (three) times a day as needed for cough    3. Chest congestion  -     dexamethasone 0.5 mg/5 mL solution; Take 10 mL (1 mg total) by mouth daily for 5 days  -     guaifenesin-codeine (GUAIFENESIN AC) 100-10 MG/5ML liquid; Take 5 mL by mouth 3 (three) times a day as needed for cough    4. Tracheitis  -     dexamethasone 0.5 mg/5 mL solution; Take 10 mL (1 mg total) by mouth daily for 5 days  -     guaifenesin-codeine (GUAIFENESIN AC) 100-10 MG/5ML liquid; Take 5 mL by mouth 3 (three) times a day as needed for cough    Patient is contact the office by Friday if her symptoms worsen or fail to improve. Subjective      Patient presents today for a ER follow up visit. Presented to the ER on 23 due to worsening symptoms of productive cough (clear to yellow green mucous), sore throat, difficulty breathing, sensation of ear fullness, nasal/chest congestion. Her symptoms started on . No fevers, chills, or sweats.  In the ER, chest x-ray was negative and COVID/flu/RSV was negative. She was diagnosed with upper URI with cough and mild tracheitis. She was given dexamethasone liquid while in the ER- she is requesting for a prescription since it helped with her breathing and she was given tessalon perles. She is taking OTC cough medicine with DM. Reports some diarrhea throughout last night. No nausea or vomiting. She is requesting to cancel appointment on 12/27/23 for her renewal of tylenol with codeine since she is being seen today. Review of Systems   Constitutional:  Positive for fatigue. HENT:  Positive for congestion, sinus pressure and sore throat. Eyes: Negative. Respiratory:  Positive for cough. Cardiovascular: Negative. Gastrointestinal:  Positive for diarrhea. Musculoskeletal: Negative. Skin: Negative. Psychiatric/Behavioral: Negative. Current Outpatient Medications on File Prior to Visit   Medication Sig   • acetaminophen-codeine (TYLENOL/CODEINE #3) 300-30 MG per tablet Take 1 tablet by mouth every 4 (four) hours as needed for moderate pain   • benzonatate (TESSALON PERLES) 100 mg capsule Take 1 capsule (100 mg total) by mouth 3 (three) times a day as needed for cough   • Semaglutide-Weight Management [facility]) 2.4 MG/0.75ML Inject 0.75 mL (2.4 mg total) under the skin once a week Inject under the skin   • Sennosides (Laxative Pills) 25 MG TABS Take 2 tablets by mouth daily at bedtime   • methocarbamol (ROBAXIN) 500 mg tablet Take 1 tablet (500 mg total) by mouth 3 (three) times a day as needed for muscle spasms (Patient not taking: Reported on 11/27/2023)       Objective     /62 (BP Location: Left arm, Patient Position: Sitting, Cuff Size: Large)   Pulse (!) 107   Ht 5' 2" (1.575 m)   Wt 68.9 kg (151 lb 12.8 oz)   SpO2 98%   BMI 27.76 kg/m²     Physical Exam  Constitutional:       Appearance: Normal appearance. She is normal weight. HENT:      Head: Normocephalic.       Right Ear: Tympanic membrane, ear canal and external ear normal.      Left Ear: Tympanic membrane, ear canal and external ear normal.      Nose: Nose normal.      Mouth/Throat:      Mouth: Mucous membranes are moist.      Pharynx: Posterior oropharyngeal erythema present. Eyes:      Conjunctiva/sclera: Conjunctivae normal.   Cardiovascular:      Rate and Rhythm: Normal rate and regular rhythm. Heart sounds: Normal heart sounds. Pulmonary:      Effort: Pulmonary effort is normal.      Breath sounds: Normal breath sounds. Musculoskeletal:      Cervical back: Normal range of motion. Skin:     General: Skin is warm and dry. Neurological:      Mental Status: She is alert and oriented to person, place, and time. Mental status is at baseline. Psychiatric:         Mood and Affect: Mood normal.         Behavior: Behavior normal.         Thought Content:  Thought content normal.         Judgment: Judgment normal.       MARK Alonso

## 2023-11-28 NOTE — ASSESSMENT & PLAN NOTE
Cough started on 11/23/23. Start guaifenesin-codeine, take 5 mL PO TID PRN for cough. Start dexamethasone, take 10 mL PO daily for 5 days.  Encouraged hydration and rest.

## 2023-12-07 RX ORDER — SEMAGLUTIDE 2.4 MG/.75ML
2.4 INJECTION, SOLUTION SUBCUTANEOUS WEEKLY
Qty: 3 ML | Refills: 0 | Status: SHIPPED | OUTPATIENT
Start: 2023-12-07

## 2023-12-31 RX ORDER — SEMAGLUTIDE 2.4 MG/.75ML
2.4 INJECTION, SOLUTION SUBCUTANEOUS WEEKLY
Qty: 3 ML | Refills: 0 | Status: SHIPPED | OUTPATIENT
Start: 2023-12-31

## 2024-01-27 PROBLEM — R05.2 SUBACUTE COUGH: Status: RESOLVED | Noted: 2023-11-28 | Resolved: 2024-01-27

## 2024-01-30 RX ORDER — SEMAGLUTIDE 2.4 MG/.75ML
2.4 INJECTION, SOLUTION SUBCUTANEOUS WEEKLY
Qty: 3 ML | Refills: 0 | Status: SHIPPED | OUTPATIENT
Start: 2024-01-30

## 2024-02-28 ENCOUNTER — OFFICE VISIT (OUTPATIENT)
Dept: FAMILY MEDICINE CLINIC | Facility: CLINIC | Age: 69
End: 2024-02-28
Payer: COMMERCIAL

## 2024-02-28 VITALS
DIASTOLIC BLOOD PRESSURE: 77 MMHG | OXYGEN SATURATION: 94 % | HEIGHT: 62 IN | SYSTOLIC BLOOD PRESSURE: 118 MMHG | HEART RATE: 83 BPM | BODY MASS INDEX: 27.76 KG/M2

## 2024-02-28 DIAGNOSIS — L29.9 ITCH: ICD-10-CM

## 2024-02-28 DIAGNOSIS — R22.32 MASS OF LEFT AXILLA: Primary | ICD-10-CM

## 2024-02-28 PROCEDURE — 99213 OFFICE O/P EST LOW 20 MIN: CPT | Performed by: NURSE PRACTITIONER

## 2024-02-28 RX ORDER — SEMAGLUTIDE 2.4 MG/.75ML
2.4 INJECTION, SOLUTION SUBCUTANEOUS WEEKLY
Qty: 3 ML | Refills: 0 | Status: SHIPPED | OUTPATIENT
Start: 2024-02-28

## 2024-02-28 RX ORDER — MOMETASONE FUROATE 1 MG/G
CREAM TOPICAL 2 TIMES DAILY
Qty: 45 G | Refills: 1 | Status: SHIPPED | OUTPATIENT
Start: 2024-02-28

## 2024-02-28 NOTE — PROGRESS NOTES
"Pt. Seen initially by PA student.  Assessed and reviewed this patient with the PA student thereafter, see plan.      Name: Jennifer Carrillo      : 1955      MRN: [id number]  Encounter Provider: MARK Chatman  Encounter Date: 2024   Encounter department: [facility] PRIMARY CARE    Assessment & Plan     1. Mass of left axilla  -     US axilla; Future; Expected date: 2024  -     mometasone (ELOCON) 0.1 % cream; Apply topically 2 (two) times a day    2. Itch  -     mometasone (ELOCON) 0.1 % cream; Apply topically 2 (two) times a day       Cyst of some nature - due to location and hx of lipoma removal will have the area in question evaluated by US.  Steroid cream prescribed for the itch      Subjective      68yr female presents with complaints of a lump present under her left axilla. She reports that this lump has been present for over 1 year and itches intensely. She believes it to be a cyst and says that it is normally pea sized but fills up with white \"cheesy\" drainage up to the size of a chickpea. She denies any pain with the lump but reports intense itching when it rubs by her bra or while her arm is down. She does not shave the area frequently and tries to avoid the use of deodorants or antiperspirants. She denies recent changes in deodorant brand. Denies erythema of the lump, changes in texture or thickness, fevers, or flu-like illness.      Review of Systems   All other systems reviewed and are negative.      Current Outpatient Medications on File Prior to Visit   Medication Sig    Semaglutide-Weight Management ([facility]) 2.4 MG/0.75ML Inject 0.75 mL (2.4 mg total) under the skin once a week Inject under the skin    Sennosides (Laxative Pills) 25 MG TABS Take 2 tablets by mouth daily at bedtime    acetaminophen-codeine (TYLENOL/CODEINE #3) 300-30 MG per tablet Take 1 tablet by mouth every 4 (four) hours as needed for moderate pain    benzonatate (TESSALON PERLES) 100 mg " "capsule Take 1 capsule (100 mg total) by mouth 3 (three) times a day as needed for cough    guaifenesin-codeine (GUAIFENESIN AC) 100-10 MG/5ML liquid Take 5 mL by mouth 3 (three) times a day as needed for cough    methocarbamol (ROBAXIN) 500 mg tablet Take 1 tablet (500 mg total) by mouth 3 (three) times a day as needed for muscle spasms (Patient not taking: Reported on 11/27/2023)       Objective     /77 (BP Location: Left arm, Patient Position: Sitting, Cuff Size: Large)   Pulse 83   Ht 5' 2\" (1.575 m)   SpO2 94%   BMI 27.76 kg/m²     Physical Exam  Vitals reviewed.   Constitutional:       General: She is not in acute distress.     Appearance: Normal appearance. She is not ill-appearing.   Skin:     General: Skin is warm.      Findings: Lesion present. No erythema or wound.      Comments: Nodular cyst-like lesion present in the left axilla. Measures less than 1cm. No erythema or discoloration noted on exam. No drainage visualized.   Neurological:      Mental Status: She is alert.       MARK Chatman    "

## 2024-02-29 NOTE — TELEPHONE ENCOUNTER
Pt called stating that the pharmacy told her we cancelled her wegovy script.  I informed her that we started a prior auth on it yesterday and were awaiting response.  She will check back with pharmacy tomorrow.

## 2024-03-03 ENCOUNTER — HOSPITAL ENCOUNTER (EMERGENCY)
Facility: HOSPITAL | Age: 69
Discharge: HOME | End: 2024-03-03
Attending: EMERGENCY MEDICINE
Payer: COMMERCIAL

## 2024-03-03 VITALS
HEIGHT: 62 IN | SYSTOLIC BLOOD PRESSURE: 133 MMHG | HEART RATE: 79 BPM | BODY MASS INDEX: 25.76 KG/M2 | WEIGHT: 140 LBS | TEMPERATURE: 98.6 F | OXYGEN SATURATION: 96 % | DIASTOLIC BLOOD PRESSURE: 94 MMHG | RESPIRATION RATE: 18 BRPM

## 2024-03-03 DIAGNOSIS — M54.9 BACK PAIN: Primary | ICD-10-CM

## 2024-03-03 DIAGNOSIS — R10.11 RUQ PAIN: ICD-10-CM

## 2024-03-03 PROCEDURE — 96372 THER/PROPH/DIAG INJ SC/IM: CPT

## 2024-03-03 PROCEDURE — 99283 EMERGENCY DEPT VISIT LOW MDM: CPT

## 2024-03-03 RX ORDER — HYDROMORPHONE HCL/PF 1 MG/ML
0.5 SYRINGE (ML) INJECTION ONCE
Status: COMPLETED | OUTPATIENT
Start: 2024-03-03 | End: 2024-03-03

## 2024-03-03 RX ORDER — ACETAMINOPHEN AND CODEINE PHOSPHATE 300; 30 MG/1; MG/1
1 TABLET ORAL EVERY 4 HOURS PRN
Qty: 20 TABLET | Refills: 0 | Status: SHIPPED | OUTPATIENT
Start: 2024-03-03

## 2024-03-03 RX ORDER — METHOCARBAMOL 500 MG/1
500 TABLET, FILM COATED ORAL 2 TIMES DAILY PRN
Qty: 20 TABLET | Refills: 0 | Status: SHIPPED | OUTPATIENT
Start: 2024-03-03

## 2024-03-03 RX ORDER — KETOROLAC TROMETHAMINE 30 MG/ML
15 INJECTION, SOLUTION INTRAMUSCULAR; INTRAVENOUS ONCE
Status: COMPLETED | OUTPATIENT
Start: 2024-03-03 | End: 2024-03-03

## 2024-03-03 RX ADMIN — HYDROMORPHONE HYDROCHLORIDE 0.5 MG: 1 INJECTION, SOLUTION INTRAMUSCULAR; INTRAVENOUS; SUBCUTANEOUS at 02:38

## 2024-03-03 RX ADMIN — KETOROLAC TROMETHAMINE 15 MG: 30 INJECTION, SOLUTION INTRAMUSCULAR; INTRAVENOUS at 02:37

## 2024-03-03 NOTE — ED PROVIDER NOTES
History  Chief Complaint   Patient presents with    Back Pain     Low mid back pain since 0800 yesterday while changing the sheets on her bed. Pt states this has happened before and nothing helps her.     Patient is a 68-year-old female with history of lower back pain presents emergency department complaining of lower back pain started yesterday no trauma or injury occurred no numbness or weakness in the leg no loss of bladder or bowel urinary retention.  No fevers.      History provided by:  Patient  Back Pain  Location:  Lumbar spine  Quality:  Aching and cramping  Radiates to:  Does not radiate  Associated symptoms: no abdominal pain, no chest pain, no dysuria, no fever, no headaches, no numbness and no weakness        Prior to Admission Medications   Prescriptions Last Dose Informant Patient Reported? Taking?   Semaglutide-Weight Management (Wegovy) 2.4 MG/0.75ML   No No   Sig: Inject 0.75 mL (2.4 mg total) under the skin once a week Inject under the skin   Sennosides (Laxative Pills) 25 MG TABS   Yes No   Sig: Take 2 tablets by mouth daily at bedtime   acetaminophen-codeine (TYLENOL/CODEINE #3) 300-30 MG per tablet   No No   Sig: Take 1 tablet by mouth every 4 (four) hours as needed for moderate pain   acetaminophen-codeine (TYLENOL/CODEINE #3) 300-30 MG per tablet   No Yes   Sig: Take 1 tablet by mouth every 4 (four) hours as needed for moderate pain for up to 20 doses   benzonatate (TESSALON PERLES) 100 mg capsule   No No   Sig: Take 1 capsule (100 mg total) by mouth 3 (three) times a day as needed for cough   guaifenesin-codeine (GUAIFENESIN AC) 100-10 MG/5ML liquid   No No   Sig: Take 5 mL by mouth 3 (three) times a day as needed for cough   methocarbamol (ROBAXIN) 500 mg tablet   No No   Sig: Take 1 tablet (500 mg total) by mouth 3 (three) times a day as needed for muscle spasms   Patient not taking: Reported on 11/27/2023   mometasone (ELOCON) 0.1 % cream   No No   Sig: Apply topically 2 (two) times a day       Facility-Administered Medications: None       Past Medical History:   Diagnosis Date    Allergic Several years ago    Arthritis Several years ago    Bronchitis     Elevated hemoglobin A1c     Pt states she does not take medication but is trying to lower with diet and exercise ( 7.1)    Kidney stone Several years ago    Lipoma of arm     LUE    Obesity Several years ago    Sphincter of Oddi dysfunction        Past Surgical History:   Procedure Laterality Date    BLADDER SUSPENSION      BREAST LUMPECTOMY Left 2000    benign mass    CHOLECYSTECTOMY      COLONOSCOPY      HYSTERECTOMY  1980    OOPHORECTOMY Bilateral 1980    DE EXC B9 LESION MRGN XCP SK TG T/A/L 3.1-4.0 CM Left 6/3/2022    Procedure: UPPER ARM MASS EXCISION;  Surgeon: Krys Mendiola DO;  Location: [facility];  Service: General    RECTAL PROLAPSE REPAIR         Family History   Problem Relation Age of Onset    No Known Problems Mother     Diabetes Father     No Known Problems Sister     No Known Problems Daughter     Leukemia Maternal Grandmother     No Known Problems Maternal Grandfather     No Known Problems Paternal Grandmother     No Known Problems Paternal Grandfather     No Known Problems Maternal Aunt     No Known Problems Maternal Aunt     No Known Problems Paternal Aunt     No Known Problems Paternal Aunt     No Known Problems Sister     No Known Problems Daughter     BRCA2 Positive Neg Hx     BRCA2 Negative Neg Hx     BRCA1 Positive Neg Hx     BRCA1 Negative Neg Hx     BRCA 1/2 Neg Hx     Ovarian cancer Neg Hx     Endometrial cancer Neg Hx     Colon cancer Neg Hx     Breast cancer additional onset Neg Hx     Breast cancer Neg Hx      I have reviewed and agree with the history as documented.    E-Cigarette/Vaping    E-Cigarette Use Never User      E-Cigarette/Vaping Substances    Nicotine No     THC No     CBD No     Flavoring No     Other No     Unknown No      Social History     Tobacco Use    Smoking status: Former     Current packs/day:  0.00     Average packs/day: 0.5 packs/day for 21.4 years (10.7 ttl pk-yrs)     Types: Cigarettes     Start date: 1972     Quit date: 1993     Years since quittin.8     Passive exposure: Never    Smokeless tobacco: Never   Vaping Use    Vaping status: Never Used   Substance Use Topics    Alcohol use: Never    Drug use: Never       Review of Systems   Constitutional:  Negative for activity change, appetite change, chills, fatigue and fever.   HENT:  Negative for congestion, ear pain, rhinorrhea and sore throat.    Eyes:  Negative for discharge, redness and visual disturbance.   Respiratory:  Negative for cough, chest tightness, shortness of breath and wheezing.    Cardiovascular:  Negative for chest pain and palpitations.   Gastrointestinal:  Negative for abdominal pain, constipation, diarrhea, nausea and vomiting.   Endocrine: Negative for polydipsia and polyuria.   Genitourinary:  Negative for difficulty urinating, dysuria, frequency, hematuria and urgency.   Musculoskeletal:  Positive for back pain. Negative for arthralgias and myalgias.   Skin:  Negative for color change, pallor and rash.   Neurological:  Negative for dizziness, weakness, light-headedness, numbness and headaches.   Hematological:  Negative for adenopathy. Does not bruise/bleed easily.   All other systems reviewed and are negative.      Physical Exam  Physical Exam  Vitals and nursing note reviewed.   Constitutional:       Appearance: She is well-developed.   HENT:      Head: Normocephalic and atraumatic.      Right Ear: External ear normal.      Left Ear: External ear normal.      Nose: Nose normal.   Eyes:      Conjunctiva/sclera: Conjunctivae normal.      Pupils: Pupils are equal, round, and reactive to light.   Cardiovascular:      Rate and Rhythm: Normal rate and regular rhythm.      Heart sounds: Normal heart sounds.   Pulmonary:      Effort: Pulmonary effort is normal. No respiratory distress.      Breath sounds: Normal breath  sounds. No wheezing or rales.   Chest:      Chest wall: No tenderness.   Abdominal:      General: Bowel sounds are normal. There is no distension.      Palpations: Abdomen is soft.      Tenderness: There is no abdominal tenderness. There is no guarding.   Musculoskeletal:      Cervical back: Normal range of motion and neck supple.      Lumbar back: Spasms and tenderness present. Positive right straight leg raise test and positive left straight leg raise test.   Skin:     General: Skin is warm and dry.   Neurological:      Mental Status: She is alert and oriented to person, place, and time.      Cranial Nerves: No cranial nerve deficit.      Sensory: No sensory deficit.         Vital Signs  ED Triage Vitals [03/03/24 0219]   Temperature Pulse Respirations Blood Pressure SpO2   98.6 °F (37 °C) 79 18 133/94 96 %      Temp Source Heart Rate Source Patient Position - Orthostatic VS BP Location FiO2 (%)   Temporal Monitor Sitting Left arm --      Pain Score       --           Vitals:    03/03/24 0219   BP: 133/94   Pulse: 79   Patient Position - Orthostatic VS: Sitting         Visual Acuity      ED Medications  Medications   HYDROmorphone (DILAUDID) injection 0.5 mg (has no administration in time range)   ketorolac (TORADOL) injection 15 mg (has no administration in time range)       Diagnostic Studies  Results Reviewed       None                   No orders to display              Procedures  Procedures         ED Course                                             Medical Decision Making  Patient is afebrile nontoxic well-appearing clinically and hemodynamically stable in the emergency department neurologically intact bilateral lower extremities no alarm features of back pain present to necessitate emergent neuroimaging and spine at this time we will treat with intramuscular pain medication and advised muscle relaxer and pain control at home and supportive care and prompt follow-up with PCP and pain and spine medicine  referral provided for further evaluation and treatment.  Return precautions and anticipatory guidance discussed.      Problems Addressed:  Back pain: acute illness or injury    Risk  Prescription drug management.             Disposition  Final diagnoses:   Back pain     Time reflects when diagnosis was documented in both MDM as applicable and the Disposition within this note       Time User Action Codes Description Comment    3/3/2024  2:30 AM Raheel Armendariz Add [M54.9] Back pain     3/3/2024  2:32 AM Raheel Armendariz [R10.11] RUQ pain check labs and ultrasound  start tylenol #3          ED Disposition       ED Disposition   Discharge    Condition   Stable    Date/Time   Sun Mar 3, 2024  2:30 AM    Comment   Jennifer Carrillo discharge to home/self care.                   Follow-up Information       Follow up With Specialties Details Why Contact Info Additional Information    [facility], Nurse Practitioner Schedule an appointment as soon as possible for a visit in 2 days  [address] [facility] [address]  [phone]       [facility] Schedule an appointment as soon as possible for a visit in 3 days  [address] [facility] [address]  [address] [facility] [address] [phone] [facility] [address], [place], [address] [phone]             Patient's Medications   Discharge Prescriptions    METHOCARBAMOL (ROBAXIN) 500 MG TABLET    Take 1 tablet (500 mg total) by mouth 2 (two) times a day as needed for muscle spasms       Start Date: 3/3/2024  End Date: --       Order Dose: 500 mg       Quantity: 20 tablet    Refills: 0           PDMP Review         Value Time User    PDMP Reviewed  Yes 3/3/2024  2:32 AM Raheel Armendariz DO            ED Provider  Electronically Signed by             Raheel Armendariz DO  03/03/24 0238

## 2024-03-04 ENCOUNTER — PATIENT MESSAGE (OUTPATIENT)
Dept: FAMILY MEDICINE CLINIC | Facility: CLINIC | Age: 69
End: 2024-03-04

## 2024-03-04 RX ORDER — SEMAGLUTIDE 2.4 MG/.75ML
2.4 INJECTION, SOLUTION SUBCUTANEOUS WEEKLY
Qty: 3 ML | Refills: 0 | Status: SHIPPED | OUTPATIENT
Start: 2024-03-04

## 2024-03-04 NOTE — TELEPHONE ENCOUNTER
Spoke with pharmacy. Stated that they cannot start prior auth as they never received the new script.

## 2024-03-18 ENCOUNTER — HOSPITAL ENCOUNTER (OUTPATIENT)
Dept: RADIOLOGY | Facility: CLINIC | Age: 69
Discharge: HOME | End: 2024-03-18
Payer: COMMERCIAL

## 2024-03-18 VITALS — WEIGHT: 142 LBS | BODY MASS INDEX: 26.13 KG/M2 | HEIGHT: 62 IN

## 2024-03-18 DIAGNOSIS — R22.32 MASS OF LEFT AXILLA: ICD-10-CM

## 2024-03-18 PROCEDURE — G0279 TOMOSYNTHESIS, MAMMO: HCPCS

## 2024-03-18 PROCEDURE — 77065 DX MAMMO INCL CAD UNI: CPT

## 2024-03-18 PROCEDURE — 76642 ULTRASOUND BREAST LIMITED: CPT

## 2024-03-19 ENCOUNTER — TELEPHONE (OUTPATIENT)
Age: 69
End: 2024-03-19

## 2024-03-19 DIAGNOSIS — R22.32 MASS OF LEFT AXILLA: ICD-10-CM

## 2024-03-19 DIAGNOSIS — L29.9 ITCH: Primary | ICD-10-CM

## 2024-03-19 NOTE — TELEPHONE ENCOUNTER
Patient called stating that she had went for a breast mammogram due to a lump patient had and they told patient that she needs to see dermatology. Patient would like provider to place a referral to dermatology with a provider she think is best for patient to go to.

## 2024-03-19 NOTE — TELEPHONE ENCOUNTER
I placed the referral to [facility] in [place] - the referral has the address on it for her.  Can we also fax the referral to them?    [facility]   Fax [phone]    Thanks.

## 2024-03-22 ENCOUNTER — CONSULT (OUTPATIENT)
Dept: PAIN MEDICINE | Facility: CLINIC | Age: 69
End: 2024-03-22
Payer: COMMERCIAL

## 2024-03-22 VITALS
WEIGHT: 153 LBS | BODY MASS INDEX: 28.16 KG/M2 | OXYGEN SATURATION: 97 % | DIASTOLIC BLOOD PRESSURE: 88 MMHG | HEART RATE: 92 BPM | TEMPERATURE: 96.7 F | HEIGHT: 62 IN | SYSTOLIC BLOOD PRESSURE: 130 MMHG | RESPIRATION RATE: 18 BRPM

## 2024-03-22 DIAGNOSIS — M79.18 MYOFASCIAL PAIN SYNDROME: ICD-10-CM

## 2024-03-22 DIAGNOSIS — M54.9 BACK PAIN: ICD-10-CM

## 2024-03-22 DIAGNOSIS — M47.814 THORACIC SPONDYLOSIS: ICD-10-CM

## 2024-03-22 PROCEDURE — 99204 OFFICE O/P NEW MOD 45 MIN: CPT | Performed by: ANESTHESIOLOGY

## 2024-03-22 RX ORDER — TIZANIDINE 4 MG/1
4 TABLET ORAL EVERY 8 HOURS PRN
Qty: 90 TABLET | Refills: 2 | Status: SHIPPED | OUTPATIENT
Start: 2024-03-22

## 2024-03-22 NOTE — PATIENT INSTRUCTIONS
Early Postoperative or Post Injury Shoulder Exercises   WHAT YOU NEED TO KNOW:   You may need to wait until your swelling and pain have gone down before you start to exercise. Do not start an exercise program before you talk to your healthcare provider.  DISCHARGE INSTRUCTIONS:   Call your doctor or physical therapist if:   You have sharp or worsening pain during exercise or at rest.    You have questions or concerns about your shoulder exercises.    Before you exercise:  Warm up and stretch before you exercise. Walk or ride a stationary bike for 5 to 10 minutes to help you warm up. Stretching helps increase range of motion. It may also decrease muscle soreness and help prevent another injury. Your healthcare provider or physical therapist will tell you which of the following stretches to do:  Crossover arm stretch:  Relax your shoulders. Hold your upper arm with the opposite hand. Pull your arm across your chest until you feel a stretch. Hold the stretch for 30 seconds. Return to the starting position.         Shoulder flexion stretch:  Stand facing a wall. Slowly walk your fingers up the wall until you feel a stretch. Hold the stretch for 30 seconds. Return to the starting position.         Sleeper stretch:  Lie on your injured side on a firm, flat surface. Bend the elbow of your injured arm 90° with your hand facing up. Use your arm that is not injured to slowly push your injured arm down. Stop when you feel a stretch at the back of your injured shoulder. Hold the stretch for 30 seconds. Slowly return to the starting position.       How to perform exercises without a weight or an exercise band:   Pendulum swings:  Lean forward and rest the arm that is not injured on a table. Do not round your back or lock your knees during the exercise. Let your other arm hang freely by your side. Gently swing your injured arm forward and backward, side to side, and in circles.         Shrugs:  Stand with your arms by your side.  Gently lift your shoulders up to your ears and hold for 5 seconds. With your shoulders lifted, pinch your shoulder blades together. Hold for 5 seconds. Slowly return to the starting position.       How to exercise with a weight:  Your healthcare provider or physical therapist will tell you how much weight to use. You may need to start with a light weight and work up to heavier weights. Hold a weight with your arm slightly in front of your body. Slowly raise your arm to the side with your thumb pointing up or down as directed. Stop when you reach the level of your shoulder. Hold for as many seconds as directed. Slowly return to the starting position.  How to exercise with an exercise band:  Your healthcare provider or physical therapist will tell you how much resistance to use.  Hold the exercise band with both hands in front of your body. Slowly raise your injured arm up and to the side with your thumb pointing up or down as directed. Stop when you reach the level of your shoulder. Hold for as many seconds as directed. Slowly return to the starting position.    Tie one end of the exercise band to a heavy object. Stand and hold the band in your hand. Bend your elbow. Keep your arm close to your side and pull the band straight back. Squeeze your shoulder blades together as you pull. Slowly return to the starting position.    Follow up with your doctor or physical therapist as directed:  Write down your questions so you remember to ask them at your visits.  © Copyright [employer] 2023 Information is for End User's use only and may not be sold, redistributed or otherwise used for commercial purposes.  The above information is an  only. It is not intended as medical advice for individual conditions or treatments. Talk to your doctor, nurse or pharmacist before following any medical regimen to see if it is safe and effective for you.  Tizanidine (By mouth)   Tizanidine (nfu-[id number]-jose antonio)  Treats muscle  spasticity.   Brand Name(s): Zanaflex, Zanaflex Capsule   There may be other brand names for this medicine.  When This Medicine Should Not Be Used:   This medicine is not right for everyone. Do not use if you had an allergic reaction to tizanidine.  How to Use This Medicine:   Capsule, Tablet  Take your medicine as directed. Your dose may need to be changed several times to find what works best for you.  You may take this medicine with or without food, but always take it the same way every time. Tizanidine works differently depending on whether you take it on an empty stomach or a full stomach. Talk to your doctor if you have any questions about this.  Missed dose: Take a dose as soon as you remember. If it is almost time for your next dose, wait until then and take a regular dose. Do not take extra medicine to make up for a missed dose.  Store the medicine in a closed container at room temperature, away from heat, moisture, and direct light.  Drugs and Foods to Avoid:   Ask your doctor or pharmacist before using any other medicine, including over-the-counter medicines, vitamins, and herbal products.  Do not use this medicine together with ciprofloxacin or fluvoxamine.  Some foods and medicines can affect how tizanidine works. Tell your doctor if you are using any of the following:  Acyclovir, baclofen, cimetidine, famotidine, ticlopidine, verapamil, zileuton  Birth control pills, blood pressure medicine, medicine for heart rhythm problems (such as amiodarone, mexiletine, propafenone), or medicine to treat an infection (such as levofloxacin, moxifloxacin)  Do not drink alcohol while you are using this medicine.  Tell your doctor if you use anything else that makes you sleepy. Some examples are allergy medicine, narcotic pain medicine, and alcohol.  Warnings While Using This Medicine:   Tell your doctor if you are pregnant or breastfeeding, or if you have kidney disease or liver disease.  This medicine may cause the  following problems:  Low blood pressure  Liver damage  This medicine may make you dizzy or drowsy. Do not drive or do anything else that could be dangerous until you know how this medicine affects you. Stand or sit up slowly if you are dizzy.  Do not stop using this medicine suddenly. Your doctor will need to slowly decrease your dose before you stop it completely.  Your doctor will do lab tests at regular visits to check on the effects of this medicine. Keep all appointments.  Keep all medicine out of the reach of children. Never share your medicine with anyone.  Possible Side Effects While Using This Medicine:   Call your doctor right away if you notice any of these side effects:  Allergic reaction: Itching or hives, swelling in your face or hands, swelling or tingling in your mouth or throat, chest tightness, trouble breathing  Dark urine or pale stools, nausea, vomiting, loss of appetite, stomach pain, yellow skin or eyes  Lightheadedness, dizziness, or fainting  Seeing or hearing things that are not really there  Slow heartbeat  If you notice these less serious side effects, talk with your doctor:   Dry mouth  Drowsiness or sleepiness  Weakness  If you notice other side effects that you think are caused by this medicine, tell your doctor.   Call your doctor for medical advice about side effects. You may report side effects to FDA at 1-689-FDA-6375  © Copyright [employer] 2023 Information is for End User's use only and may not be sold, redistributed or otherwise used for commercial purposes.  The above information is an  only. It is not intended as medical advice for individual conditions or treatments. Talk to your doctor, nurse or pharmacist before following any medical regimen to see if it is safe and effective for you.    Duloxetine (By mouth)   Duloxetine (doo-LOX-[id number]-teen)  Treats depression, anxiety, diabetic peripheral neuropathy, fibromyalgia, and chronic muscle or bone pain. This medicine is  a SNRI.   Brand Name(s): Cymbalta, Drizalma Sprinkle, Irenka   There may be other brand names for this medicine.  When This Medicine Should Not Be Used:   This medicine is not right for everyone. Do not use it if you had an allergic reaction to duloxetine.  How to Use This Medicine:   Capsule, Delayed Release Capsule  Take your medicine as directed. Your dose may need to be changed several times to find what works best for you.  Delayed-release capsule: Swallow the capsule whole. Do not crush, chew, break, or open it. Do not open the Cymbalta® delayed-release capsule and sprinkle the contents on food or in liquids.  If you have trouble swallowing the Drizalma Sprinkle™ delayed release capsule:   You may open the capsule and sprinkle the contents over one tablespoon (15 mL) of applesauce. Swallow the mixture right away and do not save any of the mixture to use later.  You may open the capsule and pour the contents to an all plastic catheter tip syringe and add 50 mL of water. Do not use other liquids. Gently shake it for 10 seconds, and then use it through a nasogastric tube. Rinse with additional water (about 15 mL) if needed.  This medicine should come with a Medication Guide. Ask your pharmacist for a copy if you do not have one.  Missed dose: Take a dose as soon as you remember. If it is almost time for your next dose, wait until then and take a regular dose. Do not take extra medicine to make up for a missed dose.  Store the medicine in a closed container at room temperature, away from heat, moisture, and direct light.  Drugs and Foods to Avoid:   Ask your doctor or pharmacist before using any other medicine, including over-the-counter medicines, vitamins, and herbal products.  Do not take duloxetine if you have used an MAO inhibitor (MAOI) within the past 14 days. Do not start taking an MAO inhibitor within 5 days of stopping duloxetine. Ask your doctor if you are not sure if you take an MAOI, including  linezolid or methylene blue injection.  Some medicines can affect how duloxetine works. Tell your doctor if you are using any of the following:  Buspirone, cimetidine, ciprofloxacin, enoxacin, fentanyl, fluvoxamine, lithium, Florin's wort, theophylline, tramadol, tryptophan, warfarin  Amphetamines  Blood pressure medicine  Diuretic (water pill)  Medicine for heart rhythm problems (including flecainide, propafenone, quinidine)  Medicine to treat migraine headaches (including triptans)  NSAID pain or arthritis medicine (including aspirin, celecoxib, diclofenac, ibuprofen, naproxen)  Other medicine to treat depression or mood disorders (including amitriptyline, desipramine, fluoxetine, imipramine, nortriptyline, paroxetine)  Phenothiazine medicine (including thioridazine)  Stomach medicine (including famotidine, antacids containing aluminum or magnesium, PPIs)  Tell your doctor if you use anything else that makes you sleepy. Some examples are allergy medicine, narcotic pain medicine, and alcohol.  Do not drink alcohol while you are using this medicine.  Warnings While Using This Medicine:   Tell your doctor if you are pregnant or breastfeeding, or if you have kidney disease, liver disease, bleeding problems, diabetes, digestion problems, glaucoma, heart disease, high or low blood pressure, or problems with urination. Tell your doctor if you smoke or you have a history of seizures, mental health problems (including bipolar disorder, nisha), or drug or alcohol addiction.  This medicine may cause the following problems:   Serious liver problems  Serotonin syndrome, when used with certain medicines  Increased risk of bleeding problems  Serious skin reactions, including erythema multiforme, Lira-Juice syndrome  Low sodium levels in the blood  Sexual problems  This medicine can increase thoughts of suicide. Tell your doctor right away if you start to feel depressed and have thoughts about hurting yourself.  This  medicine may cause blurred vision, dizziness, drowsiness, trouble with thinking, or trouble with controlling body movements, which may lead to falls, fractures, or other injuries. Do not drive or do anything that could be dangerous until you know how this medicine affects you. Stand up slowly to avoid falls.  Do not stop using this medicine suddenly. Your doctor will need to slowly decrease your dose before you stop it completely.  Your doctor will check your progress and the effects of this medicine at regular visits. Keep all appointments.  Keep all medicine out of the reach of children. Never share your medicine with anyone.  Possible Side Effects While Using This Medicine:   Call your doctor right away if you notice any of these side effects:  Allergic reaction: Itching or hives, swelling in your face or hands, swelling or tingling in your mouth or throat, chest tightness, trouble breathing  Anxiety, restlessness, fever, fast heartbeat, sweating, muscle spasms, diarrhea, seeing or hearing things that are not there  Blistering, peeling, red skin rash  Confusion, weakness, muscle twitching  Dark urine or pale stools, nausea, vomiting, loss of appetite, stomach pain, yellow skin or eyes  Decrease in how much or how often you urinate  Decrease in sexual ability, desire, drive, or performance  Eye pain, vision changes, seeing halos around lights  Feeling more energetic than usual  Lightheadedness, dizziness, fainting  Unusual moods or behaviors, worsening depression, thoughts about hurting yourself, trouble sleeping  Unusual bleeding or bruising  If you notice these less serious side effects, talk with your doctor:   Decrease in appetite or weight  Dry mouth, constipation, mild nausea  Headache  Unusual drowsiness, sleepiness, or tiredness  If you notice other side effects that you think are caused by this medicine, tell your doctor.   Call your doctor for medical advice about side effects. You may report side  effects to FDA at 4-805-FDA-2980  © Copyright [employer] 2023 Information is for End User's use only and may not be sold, redistributed or otherwise used for commercial purposes.  The above information is an  only. It is not intended as medical advice for individual conditions or treatments. Talk to your doctor, nurse or pharmacist before following any medical regimen to see if it is safe and effective for you.

## 2024-03-22 NOTE — LETTER
March 25, 2024     Raheel Armendariz DO  [address] [facility] [place] [address]    Patient: Jennifer Carrillo   YOB: 1955   Date of Visit: 3/22/2024       Dear Dr. Armendariz:    Thank you for referring Jennifer Carrillo to me for evaluation. Below are my notes for this consultation.    If you have questions, please do not hesitate to call me. I look forward to following your patient along with you.         Sincerely,        Cabrera Del Rio MD        CC: No Recipients    Cabrera Del Rio MD  3/23/2024  2:50 AM  Signed      Assessment  1. Back pain  -     Ambulatory referral to Spine & Pain Management  -     Ambulatory referral to Physical Therapy; Future  -     tiZANidine (ZANAFLEX) 4 mg tablet; Take 1 tablet (4 mg total) by mouth every 8 (eight) hours as needed for muscle spasms    2. Thoracic spondylosis  -     Ambulatory referral to Physical Therapy; Future  -     tiZANidine (ZANAFLEX) 4 mg tablet; Take 1 tablet (4 mg total) by mouth every 8 (eight) hours as needed for muscle spasms  -     MRI thoracic spine without contrast; Future; Expected date: 03/22/2024    3. Myofascial pain syndrome  -     Ambulatory referral to Physical Therapy; Future  -     tiZANidine (ZANAFLEX) 4 mg tablet; Take 1 tablet (4 mg total) by mouth every 8 (eight) hours as needed for muscle spasms    Axial mid back pain described primarily by arthritic features.  Aching, nagging, indolent, stabbing, throbbing features in axial mid back without radicular components.  5/5 strength bilaterally in upper and lower extremities, negative SLR.  Positive facet loading maneuvers in thoracic spine elicited pain, positive tenderness to palpation over thoracic paraspinal muscles more right sided.  xray thoracic spine noncontributory;however, considerable spondylosis noted in lumbar spine.  Currently she is neurologically intact without gait instability, saddle anesthesia or bowel/bladder abnormality. Risks, benefits and alternatives to KRYS, medial  branch blocks and subsequent radiofrequency ablation if successful thoroughly discussed with patient.  Handouts provided questions answered to patient satisfaction.    The patient has been experiencing moderate to severe axial spine pain that is causing functional deficit.  The pain has been present for at least 3 months and is not improving with conservative care.  Currently the patient is not experiencing any radicular features nor neurogenic claudication.  Non-facet pathology has been ruled out on clinical evaluation.      Plan  -MRI thoracic spine; will f/u result with patient  -tizanidine 4 mg t.i.d. prn muscle spasm ordered for patient; counseled regarding sedative effects of taking this medication and provided up titration calendar.  Counseled not to take medication while driving or operating heavy machinery/using stairs  -script provided for formal physical therapy for right-sided mid back pain/thoracic spondylosis and muscle spasm; Physician directed home exercise plan as per AAOS demonstrated and handouts provided that patient plans to participate with for 1 hour, twice a week for the next 6 weeks.     There are risks associated with opioid medications, including dependence, addiction and tolerance. The patient understands and agrees to use these medications only as prescribed. Potential side effects of the medications include, but are not limited to, constipation, drowsiness, addiction, impaired judgment and risk of fatal overdose if not taken as prescribed. The patient was warned against driving while taking sedation medications or operating heavy machinery. The patient voiced understanding. Sharing medications is a felony. At this point in time, the patient is showing no signs of addiction, abuse, diversion or suicidal ideation.     [place] Prescription Drug Monitoring Program report was reviewed and was appropriate      Complete risks and benefits including bleeding, infection, tissue reaction,  nerve injury and allergic reaction were discussed. The approach was demonstrated using models and literature was provided. Verbal and written consent was obtained.     My impressions and treatment recommendations were discussed in detail with the patient who verbalized understanding and had no further questions.  Discharge instructions were provided. I personally saw and examined the patient and I agree with the above discussed plan of care.      New Medications Ordered This Visit   Medications   • tiZANidine (ZANAFLEX) 4 mg tablet     Sig: Take 1 tablet (4 mg total) by mouth every 8 (eight) hours as needed for muscle spasms     Dispense:  90 tablet     Refill:  2       History of Present Illness    Jennifer Carrillo is a 68 y.o. female with pmhx of prediabetes presenting with chronic mid back pain described primarily as arthritic in nature. She describes 8/10 ,od back pain that is worse in the mornings and worse at the end of the day.  The pain is characterized by achy, nagging, indolent, crampy, stabbing pain in her axial mid back.  The patient describes that the pain is worse with standing for long periods of time on hard surfaces as well as with walking.  The patient is a very active individual and feels as though this pain compromises his participation with independent activities of daily living. The pain can be debilitating at times and contribute to significant disability, compromising overall activity and independent activities of daily living.  She has not yet trialed PT formally.  Medications the patient has tried in the past include nsaids, tylenol. She describes no radicular symptoms and has good strength.  She denies any weakness numbness or paresthesias. The patient denies any bowel or bladder dysfunction, saddle anesthesia or gait instability as well.      I have personally reviewed and/or updated the patient's past medical history, past surgical history, family history, social history, current  medications, allergies, and vital signs today.     Review of Systems   Constitutional:  Positive for activity change.   HENT: Negative.     Eyes: Negative.    Respiratory: Negative.     Cardiovascular: Negative.    Gastrointestinal: Negative.    Endocrine: Negative.    Genitourinary: Negative.    Musculoskeletal:  Positive for arthralgias, back pain and myalgias. Negative for gait problem.   Skin: Negative.    Allergic/Immunologic: Negative.    Neurological:  Negative for weakness and numbness.   Hematological: Negative.    Psychiatric/Behavioral: Negative.     All other systems reviewed and are negative.      Patient Active Problem List   Diagnosis   • Sphincter of Oddi dysfunction   • Arthritis   • Elevated hemoglobin A1c   • Hepatic steatosis   • Nasal congestion   • Chest congestion   • Tracheitis       Past Medical History:   Diagnosis Date   • Allergic Several years ago   • Arthritis Several years ago   • Bronchitis    • Elevated hemoglobin A1c     Pt states she does not take medication but is trying to lower with diet and exercise ( 7.1)   • Kidney stone Several years ago   • Lipoma of arm     LUE   • Obesity Several years ago   • Sphincter of Oddi dysfunction        Past Surgical History:   Procedure Laterality Date   • BLADDER SUSPENSION     • BREAST EXCISIONAL BIOPSY Left 2000    benign   • CHOLECYSTECTOMY     • COLONOSCOPY     • HYSTERECTOMY  1980   • OOPHORECTOMY Bilateral 1980   • NH EXC B9 LESION MRGN XCP SK TG T/A/L 3.1-4.0 CM Left 06/03/2022    Procedure: UPPER ARM MASS EXCISION;  Surgeon: Krys Mendiola DO;  Location:  MAIN OR;  Service: General   • RECTAL PROLAPSE REPAIR         Family History   Problem Relation Age of Onset   • No Known Problems Mother    • Diabetes Father    • No Known Problems Sister    • No Known Problems Daughter    • Leukemia Maternal Grandmother    • No Known Problems Maternal Grandfather    • No Known Problems Paternal Grandmother    • No Known Problems Paternal  Grandfather    • No Known Problems Maternal Aunt    • No Known Problems Maternal Aunt    • No Known Problems Paternal Aunt    • No Known Problems Paternal Aunt    • No Known Problems Sister    • No Known Problems Daughter    • BRCA2 Positive Neg Hx    • BRCA2 Negative Neg Hx    • BRCA1 Positive Neg Hx    • BRCA1 Negative Neg Hx    • BRCA 1/2 Neg Hx    • Ovarian cancer Neg Hx    • Endometrial cancer Neg Hx    • Colon cancer Neg Hx    • Breast cancer additional onset Neg Hx    • Breast cancer Neg Hx        Social History     Occupational History   • Not on file   Tobacco Use   • Smoking status: Former     Current packs/day: 0.00     Average packs/day: 0.5 packs/day for 21.4 years (10.7 ttl pk-yrs)     Types: Cigarettes     Start date: 1972     Quit date: 1993     Years since quittin.8     Passive exposure: Never   • Smokeless tobacco: Never   Vaping Use   • Vaping status: Never Used   Substance and Sexual Activity   • Alcohol use: Never   • Drug use: Never   • Sexual activity: Not Currently     Partners: Male     Birth control/protection: None       Current Outpatient Medications on File Prior to Visit   Medication Sig   • Semaglutide-Weight Management (Wegovy) 2.4 MG/0.75ML Inject 0.75 mL (2.4 mg total) under the skin once a week Inject under the skin   • Sennosides (Laxative Pills) 25 MG TABS Take 2 tablets by mouth daily at bedtime     No current facility-administered medications on file prior to visit.       Allergies   Allergen Reactions   • Cayenne - Food Allergy Shortness Of Breath     Pt states she coughs and then can't take breaths in   • Pineapple - Food Allergy Shortness Of Breath     Pt states she has a terrible cough and can't breath in   • Demerol [Meperidine] Itching   • Oxycodone Itching   • Propoxyphene Itching   • Tegaderm Alginate Ag Rope Rash and Swelling         Physical Exam    /88 (BP Location: Left arm, Patient Position: Sitting, Cuff Size: Adult)   Pulse 92   Temp (!)  "96.7 °F (35.9 °C)   Resp 18   Ht 5' 2\" (1.575 m)   Wt 69.4 kg (153 lb)   SpO2 97%   BMI 27.98 kg/m²     Constitutional: normal, well developed, well nourished, alert, in no distress and non-toxic and no overt pain behavior. and overweight  Eyes: anicteric  HEENT: grossly intact  Neck: supple, symmetric, trachea midline and no masses   Pulmonary:even and unlabored  Cardiovascular:No edema or pitting edema present  Skin:Normal without rashes or lesions and well hydrated  Psychiatric:Mood and affect appropriate  Neurologic:Cranial Nerves II-XII grossly intact Sensation grossly intact; no clonus negative romero's. Reflexes 2+ and brisk. SLR negative bilaterally. Spurling's maneuver negative bilaterally.  Musculoskeletal:normal gait. 5/5 strength bilaterally with AROM in all extremities. Normal heel toe and tip toe walking. Significant pain with thoracic facet loading bilaterally and with lateral spine rotation, right greater than left. ttp over right sided thoracic paraspinal muscles. Negative bhavin's test, negative gaenslen's negative SIJ loading bilaterally.    Imaging    THORACIC SPINE     INDICATION:   M54.42: Lumbago with sciatica, left side  M54.41: Lumbago with sciatica, right side  M62.838: Other muscle spasm.     COMPARISON:  None     VIEWS:  XR SPINE THORACIC 3 VW  Images: 4     FINDINGS:     There is no fracture or pathologic bone lesion.     Minimal smooth midthoracic dextroscoliosis     No significant degenerative changes.      There is no displacement of the paraspinal line.      The pedicles appear intact.     Visualized lungs are clear.  Right upper quadrant surgical clips     IMPRESSION:     No acute osseous abnormality.          "

## 2024-03-23 NOTE — PROGRESS NOTES
Assessment  1. Back pain  -     Ambulatory referral to Spine & Pain Management  -     Ambulatory referral to Physical Therapy; Future  -     tiZANidine (ZANAFLEX) 4 mg tablet; Take 1 tablet (4 mg total) by mouth every 8 (eight) hours as needed for muscle spasms    2. Thoracic spondylosis  -     Ambulatory referral to Physical Therapy; Future  -     tiZANidine (ZANAFLEX) 4 mg tablet; Take 1 tablet (4 mg total) by mouth every 8 (eight) hours as needed for muscle spasms  -     MRI thoracic spine without contrast; Future; Expected date: 03/22/2024    3. Myofascial pain syndrome  -     Ambulatory referral to Physical Therapy; Future  -     tiZANidine (ZANAFLEX) 4 mg tablet; Take 1 tablet (4 mg total) by mouth every 8 (eight) hours as needed for muscle spasms    Axial mid back pain described primarily by arthritic features.  Aching, nagging, indolent, stabbing, throbbing features in axial mid back without radicular components.  5/5 strength bilaterally in upper and lower extremities, negative SLR.  Positive facet loading maneuvers in thoracic spine elicited pain, positive tenderness to palpation over thoracic paraspinal muscles more right sided.  xray thoracic spine noncontributory;however, considerable spondylosis noted in lumbar spine.  Currently she is neurologically intact without gait instability, saddle anesthesia or bowel/bladder abnormality. Risks, benefits and alternatives to KRYS, medial branch blocks and subsequent radiofrequency ablation if successful thoroughly discussed with patient.  Handouts provided questions answered to patient satisfaction.    The patient has been experiencing moderate to severe axial spine pain that is causing functional deficit.  The pain has been present for at least 3 months and is not improving with conservative care.  Currently the patient is not experiencing any radicular features nor neurogenic claudication.  Non-facet pathology has been ruled out on clinical  evaluation.      Plan  -MRI thoracic spine; will f/u result with patient  -tizanidine 4 mg t.i.d. prn muscle spasm ordered for patient; counseled regarding sedative effects of taking this medication and provided up titration calendar.  Counseled not to take medication while driving or operating heavy machinery/using stairs  -script provided for formal physical therapy for right-sided mid back pain/thoracic spondylosis and muscle spasm; Physician directed home exercise plan as per AAOS demonstrated and handouts provided that patient plans to participate with for 1 hour, twice a week for the next 6 weeks.     There are risks associated with opioid medications, including dependence, addiction and tolerance. The patient understands and agrees to use these medications only as prescribed. Potential side effects of the medications include, but are not limited to, constipation, drowsiness, addiction, impaired judgment and risk of fatal overdose if not taken as prescribed. The patient was warned against driving while taking sedation medications or operating heavy machinery. The patient voiced understanding. Sharing medications is a felony. At this point in time, the patient is showing no signs of addiction, abuse, diversion or suicidal ideation.     [place] Prescription Drug Monitoring Program report was reviewed and was appropriate      Complete risks and benefits including bleeding, infection, tissue reaction, nerve injury and allergic reaction were discussed. The approach was demonstrated using models and literature was provided. Verbal and written consent was obtained.     My impressions and treatment recommendations were discussed in detail with the patient who verbalized understanding and had no further questions.  Discharge instructions were provided. I personally saw and examined the patient and I agree with the above discussed plan of care.      New Medications Ordered This Visit   Medications    tiZANidine  (ZANAFLEX) 4 mg tablet     Sig: Take 1 tablet (4 mg total) by mouth every 8 (eight) hours as needed for muscle spasms     Dispense:  90 tablet     Refill:  2       History of Present Illness    Jennifer Carrillo is a 68 y.o. female with pmhx of prediabetes presenting with chronic mid back pain described primarily as arthritic in nature. She describes 8/10 ,od back pain that is worse in the mornings and worse at the end of the day.  The pain is characterized by achy, nagging, indolent, crampy, stabbing pain in her axial mid back.  The patient describes that the pain is worse with standing for long periods of time on hard surfaces as well as with walking.  The patient is a very active individual and feels as though this pain compromises his participation with independent activities of daily living. The pain can be debilitating at times and contribute to significant disability, compromising overall activity and independent activities of daily living.  She has not yet trialed PT formally.  Medications the patient has tried in the past include nsaids, tylenol. She describes no radicular symptoms and has good strength.  She denies any weakness numbness or paresthesias. The patient denies any bowel or bladder dysfunction, saddle anesthesia or gait instability as well.      I have personally reviewed and/or updated the patient's past medical history, past surgical history, family history, social history, current medications, allergies, and vital signs today.     Review of Systems   Constitutional:  Positive for activity change.   HENT: Negative.     Eyes: Negative.    Respiratory: Negative.     Cardiovascular: Negative.    Gastrointestinal: Negative.    Endocrine: Negative.    Genitourinary: Negative.    Musculoskeletal:  Positive for arthralgias, back pain and myalgias. Negative for gait problem.   Skin: Negative.    Allergic/Immunologic: Negative.    Neurological:  Negative for weakness and numbness.   Hematological:  Negative.    Psychiatric/Behavioral: Negative.     All other systems reviewed and are negative.      Patient Active Problem List   Diagnosis    Sphincter of Oddi dysfunction    Arthritis    Elevated hemoglobin A1c    Hepatic steatosis    Nasal congestion    Chest congestion    Tracheitis       Past Medical History:   Diagnosis Date    Allergic Several years ago    Arthritis Several years ago    Bronchitis     Elevated hemoglobin A1c     Pt states she does not take medication but is trying to lower with diet and exercise ( 7.1)    Kidney stone Several years ago    Lipoma of arm     LUE    Obesity Several years ago    Sphincter of Oddi dysfunction        Past Surgical History:   Procedure Laterality Date    BLADDER SUSPENSION      BREAST EXCISIONAL BIOPSY Left 2000    benign    CHOLECYSTECTOMY      COLONOSCOPY      HYSTERECTOMY  1980    OOPHORECTOMY Bilateral 1980    CA EXC B9 LESION MRGN XCP SK TG T/A/L 3.1-4.0 CM Left 06/03/2022    Procedure: UPPER ARM MASS EXCISION;  Surgeon: Krys Medniola DO;  Location: [facility];  Service: General    RECTAL PROLAPSE REPAIR         Family History   Problem Relation Age of Onset    No Known Problems Mother     Diabetes Father     No Known Problems Sister     No Known Problems Daughter     Leukemia Maternal Grandmother     No Known Problems Maternal Grandfather     No Known Problems Paternal Grandmother     No Known Problems Paternal Grandfather     No Known Problems Maternal Aunt     No Known Problems Maternal Aunt     No Known Problems Paternal Aunt     No Known Problems Paternal Aunt     No Known Problems Sister     No Known Problems Daughter     BRCA2 Positive Neg Hx     BRCA2 Negative Neg Hx     BRCA1 Positive Neg Hx     BRCA1 Negative Neg Hx     BRCA 1/2 Neg Hx     Ovarian cancer Neg Hx     Endometrial cancer Neg Hx     Colon cancer Neg Hx     Breast cancer additional onset Neg Hx     Breast cancer Neg Hx        Social History     Occupational History    Not on file  "  Tobacco Use    Smoking status: Former     Current packs/day: 0.00     Average packs/day: 0.5 packs/day for 21.4 years (10.7 ttl pk-yrs)     Types: Cigarettes     Start date: 1972     Quit date: 1993     Years since quittin.8     Passive exposure: Never    Smokeless tobacco: Never   Vaping Use    Vaping status: Never Used   Substance and Sexual Activity    Alcohol use: Never    Drug use: Never    Sexual activity: Not Currently     Partners: Male     Birth control/protection: None       Current Outpatient Medications on File Prior to Visit   Medication Sig    Semaglutide-Weight Management (Wegovy) 2.4 MG/0.75ML Inject 0.75 mL (2.4 mg total) under the skin once a week Inject under the skin    Sennosides (Laxative Pills) 25 MG TABS Take 2 tablets by mouth daily at bedtime     No current facility-administered medications on file prior to visit.       Allergies   Allergen Reactions    Cayenne - Food Allergy Shortness Of Breath     Pt states she coughs and then can't take breaths in    Pineapple - Food Allergy Shortness Of Breath     Pt states she has a terrible cough and can't breath in    Demerol [Meperidine] Itching    Oxycodone Itching    Propoxyphene Itching    Tegaderm Alginate Ag Rope Rash and Swelling         Physical Exam    /88 (BP Location: Left arm, Patient Position: Sitting, Cuff Size: Adult)   Pulse 92   Temp (!) 96.7 °F (35.9 °C)   Resp 18   Ht 5' 2\" (1.575 m)   Wt 69.4 kg (153 lb)   SpO2 97%   BMI 27.98 kg/m²     Constitutional: normal, well developed, well nourished, alert, in no distress and non-toxic and no overt pain behavior. and overweight  Eyes: anicteric  HEENT: grossly intact  Neck: supple, symmetric, trachea midline and no masses   Pulmonary:even and unlabored  Cardiovascular:No edema or pitting edema present  Skin:Normal without rashes or lesions and well hydrated  Psychiatric:Mood and affect appropriate  Neurologic:Cranial Nerves II-XII grossly intact Sensation " grossly intact; no clonus negative romero's. Reflexes 2+ and brisk. SLR negative bilaterally. Spurling's maneuver negative bilaterally.  Musculoskeletal:normal gait. 5/5 strength bilaterally with AROM in all extremities. Normal heel toe and tip toe walking. Significant pain with thoracic facet loading bilaterally and with lateral spine rotation, right greater than left. ttp over right sided thoracic paraspinal muscles. Negative bhavin's test, negative gaenslen's negative SIJ loading bilaterally.    Imaging    THORACIC SPINE     INDICATION:   M54.42: Lumbago with sciatica, left side  M54.41: Lumbago with sciatica, right side  M62.838: Other muscle spasm.     COMPARISON:  None     VIEWS:  XR SPINE THORACIC 3 VW  Images: 4     FINDINGS:     There is no fracture or pathologic bone lesion.     Minimal smooth midthoracic dextroscoliosis     No significant degenerative changes.      There is no displacement of the paraspinal line.      The pedicles appear intact.     Visualized lungs are clear.  Right upper quadrant surgical clips     IMPRESSION:     No acute osseous abnormality.

## 2024-03-23 NOTE — H&P (VIEW-ONLY)
Assessment  1. Thoracic radiculitis    2. Back pain  -     Ambulatory referral to Spine & Pain Management  -     Ambulatory referral to Physical Therapy; Future  -     tiZANidine (ZANAFLEX) 4 mg tablet; Take 1 tablet (4 mg total) by mouth every 8 (eight) hours as needed for muscle spasms    3. Thoracic spondylosis  -     Ambulatory referral to Physical Therapy; Future  -     tiZANidine (ZANAFLEX) 4 mg tablet; Take 1 tablet (4 mg total) by mouth every 8 (eight) hours as needed for muscle spasms  -     MRI thoracic spine without contrast; Future; Expected date: 03/22/2024    4. Myofascial pain syndrome  -     Ambulatory referral to Physical Therapy; Future  -     tiZANidine (ZANAFLEX) 4 mg tablet; Take 1 tablet (4 mg total) by mouth every 8 (eight) hours as needed for muscle spasms    Axial mid back pain described primarily by arthritic features with radicular pain in T7-T11 dermatome.  Aching, nagging, indolent, stabbing, throbbing features in axial mid back with radicular components.  5/5 strength bilaterally in upper and lower extremities, negative SLR.  Positive facet loading maneuvers in thoracic spine elicited pain, positive tenderness to palpation over thoracic paraspinal muscles more right sided.  xray thoracic spine noncontributory;however, considerable spondylosis noted in lumbar spine.  Currently she is neurologically intact without gait instability, saddle anesthesia or bowel/bladder abnormality. Risks, benefits and alternatives to KRYS, medial branch blocks and subsequent radiofrequency ablation if successful thoroughly discussed with patient.  Handouts provided questions answered to patient satisfaction.    The patient has been experiencing moderate to severe axial spine pain that is causing functional deficit.  The pain has been present for at least 3 months and is not improving with conservative care.  Currently the patient is not experiencing any radicular features nor neurogenic claudication.   Non-facet pathology has been ruled out on clinical evaluation.      Plan  -MRI thoracic spine; will f/u result with patient  -tizanidine 4 mg t.i.d. prn muscle spasm ordered for patient; counseled regarding sedative effects of taking this medication and provided up titration calendar.  Counseled not to take medication while driving or operating heavy machinery/using stairs  -script provided for formal physical therapy for right-sided mid back pain/thoracic spondylosis and muscle spasm; Physician directed home exercise plan as per [facility] demonstrated and handouts provided that patient plans to participate with for 1 hour, twice a week for the next 6 weeks.     There are risks associated with opioid medications, including dependence, addiction and tolerance. The patient understands and agrees to use these medications only as prescribed. Potential side effects of the medications include, but are not limited to, constipation, drowsiness, addiction, impaired judgment and risk of fatal overdose if not taken as prescribed. The patient was warned against driving while taking sedation medications or operating heavy machinery. The patient voiced understanding. Sharing medications is a felony. At this point in time, the patient is showing no signs of addiction, abuse, diversion or suicidal ideation.     [place] Prescription Drug Monitoring Program report was reviewed and was appropriate      Complete risks and benefits including bleeding, infection, tissue reaction, nerve injury and allergic reaction were discussed. The approach was demonstrated using models and literature was provided. Verbal and written consent was obtained.     My impressions and treatment recommendations were discussed in detail with the patient who verbalized understanding and had no further questions.  Discharge instructions were provided. I personally saw and examined the patient and I agree with the above discussed plan of care.      New Medications  Ordered This Visit   Medications    tiZANidine (ZANAFLEX) 4 mg tablet     Sig: Take 1 tablet (4 mg total) by mouth every 8 (eight) hours as needed for muscle spasms     Dispense:  90 tablet     Refill:  2       History of Present Illness    Jennifer Carrillo is a 68 y.o. female with pmhx of prediabetes presenting with chronic mid back pain described primarily as arthritic in nature. She describes 8/10 ,od back pain that is worse in the mornings and worse at the end of the day.  The pain is characterized by achy, nagging, indolent, crampy, stabbing pain in her axial mid back.  The patient describes that the pain is worse with standing for long periods of time on hard surfaces as well as with walking.  The patient is a very active individual and feels as though this pain compromises his participation with independent activities of daily living. The pain can be debilitating at times and contribute to significant disability, compromising overall activity and independent activities of daily living.  She has not yet trialed PT formally.  Medications the patient has tried in the past include nsaids, tylenol. She describes b/l T7-T11 radicular symptoms and has good strength.  She denies any weakness numbness or paresthesias. The patient denies any bowel or bladder dysfunction, saddle anesthesia or gait instability as well.      I have personally reviewed and/or updated the patient's past medical history, past surgical history, family history, social history, current medications, allergies, and vital signs today.     Review of Systems   Constitutional:  Positive for activity change.   HENT: Negative.     Eyes: Negative.    Respiratory: Negative.     Cardiovascular: Negative.    Gastrointestinal: Negative.    Endocrine: Negative.    Genitourinary: Negative.    Musculoskeletal:  Positive for arthralgias, back pain and myalgias. Negative for gait problem.   Skin: Negative.    Allergic/Immunologic: Negative.    Neurological:   Negative for weakness and numbness.   Hematological: Negative.    Psychiatric/Behavioral: Negative.     All other systems reviewed and are negative.      Patient Active Problem List   Diagnosis    Sphincter of Oddi dysfunction    Arthritis    Elevated hemoglobin A1c    Hepatic steatosis    Nasal congestion    Chest congestion    Tracheitis       Past Medical History:   Diagnosis Date    Allergic Several years ago    Arthritis Several years ago    Bronchitis     Elevated hemoglobin A1c     Pt states she does not take medication but is trying to lower with diet and exercise ( 7.1)    Kidney stone Several years ago    Lipoma of arm     LUE    Obesity Several years ago    Sphincter of Oddi dysfunction        Past Surgical History:   Procedure Laterality Date    BLADDER SUSPENSION      BREAST EXCISIONAL BIOPSY Left 2000    benign    CHOLECYSTECTOMY      COLONOSCOPY      HYSTERECTOMY  1980    OOPHORECTOMY Bilateral 1980    DC EXC B9 LESION MRGN XCP SK TG T/A/L 3.1-4.0 CM Left 06/03/2022    Procedure: UPPER ARM MASS EXCISION;  Surgeon: Krys Mendiola DO;  Location: [facility];  Service: General    RECTAL PROLAPSE REPAIR         Family History   Problem Relation Age of Onset    No Known Problems Mother     Diabetes Father     No Known Problems Sister     No Known Problems Daughter     Leukemia Maternal Grandmother     No Known Problems Maternal Grandfather     No Known Problems Paternal Grandmother     No Known Problems Paternal Grandfather     No Known Problems Maternal Aunt     No Known Problems Maternal Aunt     No Known Problems Paternal Aunt     No Known Problems Paternal Aunt     No Known Problems Sister     No Known Problems Daughter     BRCA2 Positive Neg Hx     BRCA2 Negative Neg Hx     BRCA1 Positive Neg Hx     BRCA1 Negative Neg Hx     BRCA 1/2 Neg Hx     Ovarian cancer Neg Hx     Endometrial cancer Neg Hx     Colon cancer Neg Hx     Breast cancer additional onset Neg Hx     Breast cancer Neg Hx        Social  "History     Occupational History    Not on file   Tobacco Use    Smoking status: Former     Current packs/day: 0.00     Average packs/day: 0.5 packs/day for 21.4 years (10.7 ttl pk-yrs)     Types: Cigarettes     Start date: 1972     Quit date: 1993     Years since quittin.8     Passive exposure: Never    Smokeless tobacco: Never   Vaping Use    Vaping status: Never Used   Substance and Sexual Activity    Alcohol use: Never    Drug use: Never    Sexual activity: Not Currently     Partners: Male     Birth control/protection: None       Current Outpatient Medications on File Prior to Visit   Medication Sig    Semaglutide-Weight Management (Wegovy) 2.4 MG/0.75ML Inject 0.75 mL (2.4 mg total) under the skin once a week Inject under the skin    Sennosides (Laxative Pills) 25 MG TABS Take 2 tablets by mouth daily at bedtime     No current facility-administered medications on file prior to visit.       Allergies   Allergen Reactions    Cayenne - Food Allergy Shortness Of Breath     Pt states she coughs and then can't take breaths in    Pineapple - Food Allergy Shortness Of Breath     Pt states she has a terrible cough and can't breath in    Demerol [Meperidine] Itching    Oxycodone Itching    Propoxyphene Itching    Tegaderm Alginate Ag Rope Rash and Swelling         Physical Exam    /88 (BP Location: Left arm, Patient Position: Sitting, Cuff Size: Adult)   Pulse 92   Temp (!) 96.7 °F (35.9 °C)   Resp 18   Ht 5' 2\" (1.575 m)   Wt 69.4 kg (153 lb)   SpO2 97%   BMI 27.98 kg/m²     Constitutional: normal, well developed, well nourished, alert, in no distress and non-toxic and no overt pain behavior. and overweight  Eyes: anicteric  HEENT: grossly intact  Neck: supple, symmetric, trachea midline and no masses   Pulmonary:even and unlabored  Cardiovascular:No edema or pitting edema present  Skin:Normal without rashes or lesions and well hydrated  Psychiatric:Mood and affect " appropriate  Neurologic:Cranial Nerves II-XII grossly intact Sensation grossly intact; no clonus negative romero's. Reflexes 2+ and brisk. SLR negative bilaterally. Spurling's maneuver negative bilaterally.  Musculoskeletal:normal gait. 5/5 strength bilaterally with AROM in all extremities. Normal heel toe and tip toe walking. Significant pain with thoracic facet loading bilaterally and with lateral spine rotation, right greater than left. ttp over right sided thoracic paraspinal muscles. Negative bhavin's test, negative gaenslen's negative SIJ loading bilaterally.    Imaging    THORACIC SPINE     INDICATION:   M54.42: Lumbago with sciatica, left side  M54.41: Lumbago with sciatica, right side  M62.838: Other muscle spasm.     COMPARISON:  None     VIEWS:  XR SPINE THORACIC 3 VW  Images: 4     FINDINGS:     There is no fracture or pathologic bone lesion.     Minimal smooth midthoracic dextroscoliosis     No significant degenerative changes.      There is no displacement of the paraspinal line.      The pedicles appear intact.     Visualized lungs are clear.  Right upper quadrant surgical clips     IMPRESSION:     No acute osseous abnormality.

## 2024-03-25 ENCOUNTER — TELEPHONE (OUTPATIENT)
Age: 69
End: 2024-03-25

## 2024-03-26 ENCOUNTER — EVALUATION (OUTPATIENT)
Dept: PHYSICAL THERAPY | Facility: CLINIC | Age: 69
End: 2024-03-26
Payer: COMMERCIAL

## 2024-03-26 DIAGNOSIS — M47.814 THORACIC SPONDYLOSIS: ICD-10-CM

## 2024-03-26 DIAGNOSIS — G89.29 CHRONIC MIDLINE THORACIC BACK PAIN: Primary | ICD-10-CM

## 2024-03-26 DIAGNOSIS — M79.18 MYOFASCIAL PAIN SYNDROME: ICD-10-CM

## 2024-03-26 DIAGNOSIS — M54.6 CHRONIC MIDLINE THORACIC BACK PAIN: Primary | ICD-10-CM

## 2024-03-26 PROCEDURE — 97112 NEUROMUSCULAR REEDUCATION: CPT

## 2024-03-26 PROCEDURE — 97140 MANUAL THERAPY 1/> REGIONS: CPT

## 2024-03-26 PROCEDURE — 97161 PT EVAL LOW COMPLEX 20 MIN: CPT

## 2024-03-26 NOTE — PROGRESS NOTES
PT Evaluation     Today's date: 3/26/2024  Patient name: Jennifer Carrillo  : 1955  MRN: [id number]  Referring provider: Cabrera Del Rio MD  Dx:   Encounter Diagnosis     ICD-10-CM    1. Back pain  M54.9 Ambulatory referral to Physical Therapy      2. Thoracic spondylosis  M47.814 Ambulatory referral to Physical Therapy      3. Myofascial pain syndrome  M79.18 Ambulatory referral to Physical Therapy          Start Time: 1100  Stop Time: 1205  Total time in clinic (min): 65 minutes    Assessment  Assessment details: Patient is a 68 y.o. female with medical diagnosis of thoracic spine pain and myofascial pain syndrome. Following a thorough physical therapy evaluation, the patient demonstrates objective impairments in thoracic ROM, posture, and scapular strength. Performed STM to paraspinal musculature in T/S, noted improved pain-free R thoracic rotation ROM, though L ROT still painful actively. Provided patient c/ handout c/ HEP. Patient demonstrated each exercise c/ good form and verbalized understanding of expectations c/ HEP. Patient will benefit from skilled physical therapy treatment to address the aforementioned impairments and functional deficits, accomplish patient goals and return patient to [facility].    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  Scap Strength >=4-/5    LTG (8 weeks):  Thoracic ROM WFL.  Able to mimi and wash floors c/ <=4/10 pain.  75% improvement in mid-back pain  Patient will be independent with HEP in 8 weeks to allow independent management of condition.      Plan  Plan details: TE, NMR, TA, MT, self-care, and modalities as needed in order to progress through skilled PT focused on ROM, strength, posture,  motor control.    Patient would benefit from: skilled physical therapy  Planned modality interventions: cryotherapy and thermotherapy: hydrocollator packs  Planned therapy interventions: manual therapy, neuromuscular re-education, patient education, self care, therapeutic activities, therapeutic exercise and home exercise program  Frequency: 2x week  Duration in weeks: 6  Plan of Care beginning date: 3/26/2024  Plan of Care expiration date: 5/7/2024  Treatment plan discussed with: patient      Subjective Evaluation    History of Present Illness  Mechanism of injury: IE: Patient is a 68 y.o. female presenting with thoracic spine pain. Has had thoracic spine pain for at least a year which has gradually worsened. She crochets regularly and now uses a support pillow for just between her shoulder blades to decrease pain. Also has a postural support brace which she says also helps the symptoms. 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 Has muscle relaxers which help c/ tightness, but not the pain. Nothing really relieves the pain, even pain killers. Denies radicular symptoms.  X-rays (-) for fx. Has long-standing history of LBP for years where if she moves too much she is in bed for days, to point where her  has to help her to the bathroom. In 2022, had first spasms on lumbar spine and went to ED. Was told she has DDD in neck and back, minimal space between vertebrae, bone spurs, and foraminal narrowing.  F/u c/ [facility] next month, MRI scheduled for 4/1/24. Patients primary goals for PT are to feel better c/ less spasms.     Patient Goals  Patient goals for therapy: decreased pain, increased motion, increased strength and return to sport/leisure activities  Patient goal: no spasms  Pain  Current pain rating: 3  At best pain rating: 3  At worst pain rating: 10  Quality: dull ache, cramping and knife-like  Relieving factors: change in  "position and heat  Aggravating factors: standing (crocheting, cleaning (washing floors on hands and knees), forward tasks in kitchen while standing)    Social Support    Employment status: not working (Retired)  Hand dominance: right        Objective    Observation:     -Posture: Moderate thoracic kyphosis c/ rounded shoulders.     TTP: Sps and paraspinals of T5-9    Cervical ROM Screen: Slight limitation in R ROT compared to L ROT c/ \"pinching\" on R side, (-) for concordant symptoms.    Thoracic ROM: Tested all in seated  FLX: Slight limitation though rests in mid thoracic flexion, pulling and stretching in  mid back at end range  EXT: (-) p! But limited overall  ROT: Moderate limitation bilaterally; R ROT central mid back p! C/ gentle OP, L ROT central mid back p! At end range (did not use OP)    Shoulder ROM Screen: WNL.    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, DMT2    Daily Treatment Diary:      Initial Evaluation Date: 03/26/24  POC Expiration: 5/7/24  Compliance 03/26/24                     Visit Number 1                    Re-Eval  IE                 MC   Foto Captured Y                         Date 3/26       Manuals        STM KS Mid T/S Paraspinal                               Neuro Re-Ed        TA Activation 10\"/10       Abdominal Iso c/ SB nv       Scap Retract        Blackburns: Row, Ext, HABD nv       Bilateral ER        Bilateral Hklg HABD: ER & IR RTB x15ea       TB Rows & EXT RTB Row x15ea                                       Ther Ex        Seated TS Rotation c/ C/S Fixed nv       Standing FLX/Scaption        Open Book        Foam Roller T/S        SA Punch nv       UBE nv               Ther Activity        Chop/Lift        Box Lift                                Modalities                             Access Code: [email or web address]  URL: [email or web address]  Date: 03/26/2024  Prepared by: Wendy Arroyo    Exercises  - Supine Transversus " Abdominis Bracing - Hands on Stomach  - 1 x daily - 7 x weekly - 10 reps - 10 seconds hold  - Supine Shoulder Horizontal Abduction with Resistance  - 1 x daily - 7 x weekly - 1 sets - 15 reps  - Standing Shoulder Row with Anchored Resistance  - 1 x daily - 7 x weekly - 2-3 sets - 10 reps

## 2024-03-27 RX ORDER — SEMAGLUTIDE 2.4 MG/.75ML
2.4 INJECTION, SOLUTION SUBCUTANEOUS WEEKLY
Qty: 3 ML | Refills: 0 | Status: SHIPPED | OUTPATIENT
Start: 2024-03-27

## 2024-03-28 ENCOUNTER — OFFICE VISIT (OUTPATIENT)
Dept: PHYSICAL THERAPY | Facility: CLINIC | Age: 69
End: 2024-03-28
Payer: COMMERCIAL

## 2024-03-28 DIAGNOSIS — M47.814 THORACIC SPONDYLOSIS: ICD-10-CM

## 2024-03-28 DIAGNOSIS — G89.29 CHRONIC MIDLINE THORACIC BACK PAIN: Primary | ICD-10-CM

## 2024-03-28 DIAGNOSIS — M54.6 CHRONIC MIDLINE THORACIC BACK PAIN: Primary | ICD-10-CM

## 2024-03-28 DIAGNOSIS — M79.18 MYOFASCIAL PAIN SYNDROME: ICD-10-CM

## 2024-03-28 PROCEDURE — 97112 NEUROMUSCULAR REEDUCATION: CPT

## 2024-03-28 PROCEDURE — 97530 THERAPEUTIC ACTIVITIES: CPT

## 2024-03-28 PROCEDURE — 97140 MANUAL THERAPY 1/> REGIONS: CPT

## 2024-03-28 NOTE — PROGRESS NOTES
"Daily Note     Today's date: 3/28/2024  Patient name: Jennifer Carrillo  : 1955  MRN: [id number]  Referring provider: Cabrera Del Rio MD  Dx:   Encounter Diagnosis     ICD-10-CM    1. Chronic midline thoracic back pain  M54.6     G89.29       2. Thoracic spondylosis  M47.814       3. Myofascial pain syndrome  M79.18         Patient treated by Dwain Smith SPT under the direct supervision of Wendy Arroyo PT, DPT.     Start Time: 1052  Stop Time: 1145  Total time in clinic (min): 53 minutes    Subjective: Patient reported to therapy with muscle soreness in her thoracic spine.         Objective: See treatment diary below      Assessment: Tolerated treatment well. Patient demonstrated fatigue post treatment, exhibited good technique with therapeutic exercises, and would benefit from continued PT. Pt had mild soreness and pulling sensation through her thoracic spine while performing thoracic rotation at the start of treatment. Improved ROM within spine after STM to paraspinals. Introduced blackburns row, ext, and H Abd in order to improve strength to improve stabilization of back. Will benefit from continued strengthening and core stabilization.      Plan: Continue per plan of care.      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, DMT2    Daily Treatment Diary:      Initial Evaluation Date: 24  POC Expiration: 24  Compliance 03/26/24  3/28                   Visit Number 1 2                   Re-Eval  IE                 JACINTO   Foto Captured Y                         Date 3/26 3/28      Manuals        STM KS Mid T/S Paraspinal KS Mid T/S Paraspinal                              Neuro Re-Ed        TA Activation 10\"/10 10\"/10      Abdominal Iso c/ SB nv 5\"/15      Scap Retract        Blackburns: Row, Ext, HABD nv X15 ea      Bilateral ER        Bilateral Hklg HABD: ER & IR RTB x15ea RTB x15ea      TB Rows & EXT RTB Row x15ea RTB Row x15ea                                        Ther Ex        Seated TS Rotation c/ C/S " "Fixed nv 5\"/10      Standing FLX/Scaption        Open Book        Foam Roller T/S        SA Punch nv X15 c/ cane      UBE nv 2'/2'              Ther Activity        Chop/Lift        Box Lift                                Modalities                             Access Code: [email or web address]  URL: [email or web address]  Date: 03/26/2024  Prepared by: Wendy Arroyo    Exercises  - Supine Transversus Abdominis Bracing - Hands on Stomach  - 1 x daily - 7 x weekly - 10 reps - 10 seconds hold  - Supine Shoulder Horizontal Abduction with Resistance  - 1 x daily - 7 x weekly - 1 sets - 15 reps  - Standing Shoulder Row with Anchored Resistance  - 1 x daily - 7 x weekly - 2-3 sets - 10 reps         " Stage 15: Number Of Blocks?: 0

## 2024-04-01 ENCOUNTER — HOSPITAL ENCOUNTER (OUTPATIENT)
Dept: MRI IMAGING | Facility: HOSPITAL | Age: 69
Discharge: HOME | End: 2024-04-01
Attending: ANESTHESIOLOGY
Payer: COMMERCIAL

## 2024-04-01 DIAGNOSIS — M47.814 THORACIC SPONDYLOSIS: ICD-10-CM

## 2024-04-01 PROCEDURE — 72146 MRI CHEST SPINE W/O DYE: CPT

## 2024-04-02 ENCOUNTER — OFFICE VISIT (OUTPATIENT)
Dept: PHYSICAL THERAPY | Facility: CLINIC | Age: 69
End: 2024-04-02
Payer: COMMERCIAL

## 2024-04-02 DIAGNOSIS — G89.29 CHRONIC MIDLINE THORACIC BACK PAIN: Primary | ICD-10-CM

## 2024-04-02 DIAGNOSIS — M54.6 CHRONIC MIDLINE THORACIC BACK PAIN: Primary | ICD-10-CM

## 2024-04-02 DIAGNOSIS — M47.814 THORACIC SPONDYLOSIS: ICD-10-CM

## 2024-04-02 DIAGNOSIS — M79.18 MYOFASCIAL PAIN SYNDROME: ICD-10-CM

## 2024-04-02 PROCEDURE — 97112 NEUROMUSCULAR REEDUCATION: CPT

## 2024-04-02 PROCEDURE — 97140 MANUAL THERAPY 1/> REGIONS: CPT

## 2024-04-02 NOTE — PROGRESS NOTES
"Daily Note     Today's date: 2024  Patient name: Jennifer Carrillo  : 1955  MRN: [id number]  Referring provider: Cabrera Del Rio MD  Dx:   Encounter Diagnosis     ICD-10-CM    1. Chronic midline thoracic back pain  M54.6     G89.29       2. Thoracic spondylosis  M47.814       3. Myofascial pain syndrome  M79.18         Patient treated by Dwain Smith SPT under the direct supervision of Wendy Arroyo PT, DPT.   Start Time: 0845  Stop Time: 930  Total time in clinic (min): 45 minutes    Subjective: Patient reported to therapy with muscle soreness in thoracic spine. Back felt less tight from last visit. Had MRI and waiting for results.      Objective: See treatment diary below      Assessment: Tolerated treatment well. Patient demonstrated fatigue post treatment, exhibited good technique with therapeutic exercises, and would benefit from continued PT. Pt showed limited ROM in thoracic rotation prior to starting exercises.Noticeable increase in ROM and felt relief in back following completion of exercise program and STM to the paraspinals. Showed good form with exercises and will plan to progress with adding weights to blackburn exercises next visit.       Plan: Continue per plan of care.      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, DMT2    Daily Treatment Diary:      Initial Evaluation Date: 24  POC Expiration: 24  Compliance 03/26/24  3/28  4/2                 Visit Number 1 2  3                 Re-Eval  IE                 MC   Foto Captured Y                         Date 3/26 3/28 4/2     Manuals        STM KS Mid T/S Paraspinal KS Mid T/S Paraspinal JH Mid T/S Paraspnal                             Neuro Re-Ed        TA Activation 10\"/10 10\"/10 10\"/10     Abdominal Iso c/ SB nv 5\"/15 5\"/15     Scap Retract        Blackburns: Row, Ext, HABD nv X15 ea X15 ea     Bilateral ER        Bilateral Hklg HABD: ER & IR RTB x15ea RTB x15ea RTB x15ea     TB Rows & EXT RTB Row x15ea RTB Row x15ea RTB Row x15ea   " "                                    Ther Ex        Seated TS Rotation c/ C/S Fixed nv 5\"/10 5\"/10     Standing FLX/Scaption        Open Book        Foam Roller T/S        SA Punch nv X15 c/ cane X20 c/ cane     UBE nv 2'/2' 2'/2'             Ther Activity        Chop/Lift        Box Lift                                Modalities                             Access Code: [email or web address]  URL: [email or web address]  Date: 03/26/2024  Prepared by: Wendy Arroyo    Exercises  - Supine Transversus Abdominis Bracing - Hands on Stomach  - 1 x daily - 7 x weekly - 10 reps - 10 seconds hold  - Supine Shoulder Horizontal Abduction with Resistance  - 1 x daily - 7 x weekly - 1 sets - 15 reps  - Standing Shoulder Row with Anchored Resistance  - 1 x daily - 7 x weekly - 2-3 sets - 10 reps           "

## 2024-04-04 ENCOUNTER — OFFICE VISIT (OUTPATIENT)
Dept: PHYSICAL THERAPY | Facility: CLINIC | Age: 69
End: 2024-04-04
Payer: COMMERCIAL

## 2024-04-04 DIAGNOSIS — G89.29 CHRONIC MIDLINE THORACIC BACK PAIN: Primary | ICD-10-CM

## 2024-04-04 DIAGNOSIS — M54.6 CHRONIC MIDLINE THORACIC BACK PAIN: Primary | ICD-10-CM

## 2024-04-04 DIAGNOSIS — M79.18 MYOFASCIAL PAIN SYNDROME: ICD-10-CM

## 2024-04-04 DIAGNOSIS — M47.814 THORACIC SPONDYLOSIS: ICD-10-CM

## 2024-04-04 PROCEDURE — 97530 THERAPEUTIC ACTIVITIES: CPT

## 2024-04-04 PROCEDURE — 97140 MANUAL THERAPY 1/> REGIONS: CPT

## 2024-04-04 PROCEDURE — 97112 NEUROMUSCULAR REEDUCATION: CPT

## 2024-04-04 NOTE — PROGRESS NOTES
"Daily Note     Today's date: 2024  Patient name: Jennifer Carrillo  : 1955  MRN: [id number]  Referring provider: Cabrera Del Rio MD  Dx:   Encounter Diagnosis     ICD-10-CM    1. Chronic midline thoracic back pain  M54.6     G89.29       2. Thoracic spondylosis  M47.814       3. Myofascial pain syndrome  M79.18         Patient treated by Dwain Smith SPT under the direct supervision of Wendy Arroyo PT, DPT.     Start Time: 08  Stop Time: 930  Total time in clinic (min): 45 minutes    Subjective: Patient reported to therapy with pain and pulling in her mid thoracic back which is going up into her neck now. Felt sore after leaving last session.      Objective: See treatment diary below      Assessment: Tolerated treatment well. Patient demonstrated fatigue post treatment, exhibited good technique with therapeutic exercises, and would benefit from continued PT. Pt is progressing with strengthening as evidenced by ability to add weight to shoulder ext and row as well as increasing resistance band on TB rows. She had pain in her L shoulder with horizontal abd today. Noticed significant improvement in thoracic rotation and cervical flexion/extension ROM and decreased pain after STM and traction to her cervical and thoracic spine.        Plan: Continue per plan of care.      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, DMT2    Daily Treatment Diary:      Initial Evaluation Date: 24  POC Expiration: 24  Compliance 03/26/24  3/28  4/2  4/4               Visit Number 1 2  3  4               Re-Eval  IE                 MC   Foto Captured Y                         Date 3/26 3/28 4/2 4/4    Manuals        STM KS Mid T/S Paraspinal KS Mid T/S Paraspinal JH Mid T/S Paraspnal JH Mid T/S Paraspinal    Manual c-spine traction    KS    C-spine AAROM    KS B ROT            Neuro Re-Ed        TA Activation 10\"/10 10\"/10 10\"/10 10\"/10    Abdominal Iso c/ SB nv 5\"/15 5\"/15 5\"/15    Scap Retract        Blackburns: Row, " "Ext, HABD nv X15 ea X15 ea 1# Ext/Row x15 0# Abd x15    Bilateral ER        Bilateral Hklg HABD: ER & IR RTB x15ea RTB x15ea RTB x15ea RTB x15ea    TB Rows & EXT RTB Row x15ea RTB Row x15 RTB Row x15 GTB Row x20    Chin Tucks    5\"/10 Supine                              Ther Ex        Seated TS Rotation c/ C/S Fixed nv 5\"/10 5\"/10 5\"/10    Standing FLX/Scaption        Open Book        Foam Roller T/S        SA Punch nv X15 c/ cane X20 c/ cane X20 c/ cane    UBE nv 2'/2' 2'/2' 2'/2'            Ther Activity        Chop/Lift        Box Lift                                Modalities                             Access Code: [email or web address]  URL: [email or web address]  Date: 03/26/2024  Prepared by: Wendy Arroyo    Exercises  - Supine Transversus Abdominis Bracing - Hands on Stomach  - 1 x daily - 7 x weekly - 10 reps - 10 seconds hold  - Supine Shoulder Horizontal Abduction with Resistance  - 1 x daily - 7 x weekly - 1 sets - 15 reps  - Standing Shoulder Row with Anchored Resistance  - 1 x daily - 7 x weekly - 2-3 sets - 10 reps             "

## 2024-04-08 ENCOUNTER — TELEPHONE (OUTPATIENT)
Dept: PAIN MEDICINE | Facility: CLINIC | Age: 69
End: 2024-04-08

## 2024-04-08 ENCOUNTER — OFFICE VISIT (OUTPATIENT)
Dept: PHYSICAL THERAPY | Facility: CLINIC | Age: 69
End: 2024-04-08
Payer: COMMERCIAL

## 2024-04-08 DIAGNOSIS — M47.814 THORACIC SPONDYLOSIS: ICD-10-CM

## 2024-04-08 DIAGNOSIS — M79.18 MYOFASCIAL PAIN SYNDROME: ICD-10-CM

## 2024-04-08 DIAGNOSIS — G89.29 CHRONIC MIDLINE THORACIC BACK PAIN: Primary | ICD-10-CM

## 2024-04-08 DIAGNOSIS — M54.6 CHRONIC MIDLINE THORACIC BACK PAIN: Primary | ICD-10-CM

## 2024-04-08 PROBLEM — M54.14 THORACIC RADICULITIS: Status: ACTIVE | Noted: 2024-04-08

## 2024-04-08 PROCEDURE — 97140 MANUAL THERAPY 1/> REGIONS: CPT

## 2024-04-08 NOTE — TELEPHONE ENCOUNTER
Small right subarticular disc protrusion at levels T11-12 and T12-L1 mildly indenting right ventral thecal sac without significant canal stenosis.  Minimal disc bulge at the levels T7 through T11 with tiny disc protrusions (left subarticular T7-8 and right subarticular T8-9). There is minor canal narrowing at these levels.    Above MRI findings discussed with patient via phone call; will plan for ILESI at T11-T12 or alternate level to target spondylosis/ radicular pain; informed consent to be obtained day of procedure.

## 2024-04-08 NOTE — PROGRESS NOTES
"Daily Note     Today's date: 2024  Patient name: Jennifer Carrillo  : 1955  MRN: [id number]  Referring provider: Cabrera Del Rio MD  Dx:   Encounter Diagnosis     ICD-10-CM    1. Chronic midline thoracic back pain  M54.6     G89.29       2. Thoracic spondylosis  M47.814       3. Myofascial pain syndrome  M79.18           Start Time: 915  Stop Time: 945  Total time in clinic (min): 30 minutes    Subjective: Reports she had a bad weekend c/ mid back pain. Has been taking her muscle relaxers regularly to help with the pain.       Objective: See treatment diary below      Assessment: Tolerated treatment fair. Patient demonstrated fatigue post treatment and would benefit from continued PT. Per patient request held exercises today due to increased pain. Cervical spine ROM still improved from last visit, but thoracic spine ROM painful and limited bilaterally. Performed manual therapy interventions and noted improved pain-free thoracic rotation ROM following.       Plan: Continue per plan of care.      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, DMT2    Daily Treatment Diary:      Initial Evaluation Date: 24  POC Expiration: 24  Compliance 03/26/24  3/28  4/2  4/4  4/8             Visit Number 1 2  3  4  5             Re-Eval  IE                 MC   Foto Captured Y                         Date 3/26 3/28 4/2 4/4 4/8   Manuals        STM KS Mid T/S Paraspinal KS Mid T/S Paraspinal JH Mid T/S Paraspnal JH Mid T/S Paraspinal KS Mid T/S Paraspinal   Manual c-spine traction    KS KS   C-spine AAROM    KS B ROT KS B ROT           Neuro Re-Ed        TA Activation 10\"/10 10\"/10 10\"/10 10\"10 held   Abdominal Iso c/ SB nv 5\"15 5\"/15 5\"15 held   Scap Retract        Blackburns: Row, Ext, HABD nv X15 ea X15 ea 1# Ext/Row x15 0# Abd x15 held   Bilateral ER        Bilateral Hklg HABD: ER & IR RTB x15ea RTB x15ea RTB x15ea RTB x15ea held   TB Rows & EXT RTB Row x15ea RTB Row x15 RTB Row x15 GTB Row x20 held   Chin Tucks   " " 5\"/10 Supine held                             Ther Ex        Seated TS Rotation c/ C/S Fixed nv 5\"/10 5\"/10 5\"/10 5\"/10   Standing FLX/Scaption        Open Book        Foam Roller T/S        SA Punch nv X15 c/ cane X20 c/ cane X20 c/ cane held   UBE nv 2'/2' 2'/2' 2'/2' held           Ther Activity        Chop/Lift        Box Lift                                Modalities                             Access Code: [email or web address]  URL: [email or web address]  Date: 03/26/2024  Prepared by: Wendy Arroyo    Exercises  - Supine Transversus Abdominis Bracing - Hands on Stomach  - 1 x daily - 7 x weekly - 10 reps - 10 seconds hold  - Supine Shoulder Horizontal Abduction with Resistance  - 1 x daily - 7 x weekly - 1 sets - 15 reps  - Standing Shoulder Row with Anchored Resistance  - 1 x daily - 7 x weekly - 2-3 sets - 10 reps               "

## 2024-04-09 ENCOUNTER — PREP FOR PROCEDURE (OUTPATIENT)
Dept: PAIN MEDICINE | Facility: CLINIC | Age: 69
End: 2024-04-09

## 2024-04-09 DIAGNOSIS — M47.814 THORACIC SPONDYLOSIS: Primary | ICD-10-CM

## 2024-04-10 ENCOUNTER — APPOINTMENT (OUTPATIENT)
Dept: PHYSICAL THERAPY | Facility: CLINIC | Age: 69
End: 2024-04-10
Payer: COMMERCIAL

## 2024-04-16 ENCOUNTER — HOSPITAL ENCOUNTER (OUTPATIENT)
Dept: INTERVENTIONAL RADIOLOGY/VASCULAR | Facility: HOSPITAL | Age: 69
Discharge: HOME | End: 2024-04-16
Attending: ANESTHESIOLOGY
Payer: COMMERCIAL

## 2024-04-16 VITALS
HEART RATE: 79 BPM | BODY MASS INDEX: 28.16 KG/M2 | RESPIRATION RATE: 20 BRPM | WEIGHT: 153 LBS | HEIGHT: 62 IN | SYSTOLIC BLOOD PRESSURE: 140 MMHG | DIASTOLIC BLOOD PRESSURE: 86 MMHG | TEMPERATURE: 98.5 F | OXYGEN SATURATION: 96 %

## 2024-04-16 DIAGNOSIS — M47.814 THORACIC SPONDYLOSIS: ICD-10-CM

## 2024-04-16 LAB — GLUCOSE SERPL-MCNC: 99 MG/DL (ref 65–140)

## 2024-04-16 PROCEDURE — 82948 REAGENT STRIP/BLOOD GLUCOSE: CPT

## 2024-04-16 PROCEDURE — 62321 NJX INTERLAMINAR CRV/THRC: CPT | Performed by: ANESTHESIOLOGY

## 2024-04-16 RX ORDER — 0.9 % SODIUM CHLORIDE 0.9 %
VIAL (ML) INJECTION AS NEEDED
Status: COMPLETED | OUTPATIENT
Start: 2024-04-16 | End: 2024-04-16

## 2024-04-16 RX ORDER — LIDOCAINE HYDROCHLORIDE 10 MG/ML
INJECTION, SOLUTION EPIDURAL; INFILTRATION; INTRACAUDAL; PERINEURAL AS NEEDED
Status: COMPLETED | OUTPATIENT
Start: 2024-04-16 | End: 2024-04-16

## 2024-04-16 RX ORDER — METHYLPREDNISOLONE ACETATE 80 MG/ML
INJECTION, SUSPENSION INTRA-ARTICULAR; INTRALESIONAL; INTRAMUSCULAR; PARENTERAL; SOFT TISSUE AS NEEDED
Status: COMPLETED | OUTPATIENT
Start: 2024-04-16 | End: 2024-04-16

## 2024-04-16 RX ADMIN — LIDOCAINE HYDROCHLORIDE 5 ML: 10 INJECTION, SOLUTION EPIDURAL; INFILTRATION; INTRACAUDAL; PERINEURAL at 12:53

## 2024-04-16 RX ADMIN — IOHEXOL 1 ML: 240 INJECTION, SOLUTION INTRATHECAL; INTRAVASCULAR; INTRAVENOUS; ORAL at 12:55

## 2024-04-16 RX ADMIN — METHYLPREDNISOLONE ACETATE 80 MG: 80 INJECTION, SUSPENSION INTRA-ARTICULAR; INTRALESIONAL; INTRAMUSCULAR; SOFT TISSUE at 12:55

## 2024-04-16 RX ADMIN — SODIUM CHLORIDE 3 ML: 9 INJECTION, SOLUTION INTRAMUSCULAR; INTRAVENOUS; SUBCUTANEOUS at 12:55

## 2024-04-16 NOTE — DISCHARGE INSTR - AVS FIRST PAGE
YOUR 2 WEEK FOLLOW UP HAS BEEN SCHEDULED; IF YOU WISH TO CHANGE THE FOLLOW UP, PLEASE CALL THE [facility] AT [place]: [phone]    Epidural Steroid Injection   WHAT YOU NEED TO KNOW:   An epidural steroid injection (KRYS) is a procedure to inject steroid medicine into the epidural space. The epidural space is between your spinal cord and vertebrae. Steroids reduce inflammation and fluid buildup in your spine that may be causing pain. You may be given pain medicine along with the steroids.        DISCHARGE INSTRUCTIONS:   Call your local emergency number (911 in the [place]) if:   You have a seizure.    You have trouble moving your legs.    Seek care immediately if:   Blood soaks through your bandage.    You have a fever or chills, severe back pain, and the procedure area is sensitive to the touch.    You cannot control when you urinate or have a bowel movement.    Call your doctor if:   You have weakness or numbness in your legs.    Your wound is red, swollen, or draining pus.    You have nausea or are vomiting.    Your face or neck is red and you feel warm.    You have more pain than you had before the procedure.    You have swelling in your hands or feet.    You have questions or concerns about your condition or care.    Care for your wound as directed:  You may remove the bandage before you go to bed the day of your procedure. You may take a shower, but do not take a bath for at least 24 hours.   Self-care:   Do not drive,  use machines, or do strenuous activity for 24 hours after your procedure or as directed.     Continue other treatments  as directed. Steroid injections alone will not control your pain. The injections are meant to be used with other treatments, such as physical therapy.    Follow up with your doctor as directed:  Write down your questions so you remember to ask them during your visits.     EPIDURAL STEROID INJECTION DISCHARGE INSTRUCTIONS      ACTIVITY  Do not drive or operate  machinery today.  No strenuous activity today - bending, lifting, etc.   You may resume normal activities starting tomorrow - start slowly and as tolerated.  You may shower today, but not tub baths or hot tubs.  You may have numbness for several hours from the local anesthetics. Please use caution and common sense, especially with weight-bearing activities.    CARE OF THE INJECTION SITE  If you have soreness or pain apply ice to the area today (20 minutes on and 20 minutes off).  Starting tomorrow, you   Notify the [facility] if you have any of the following: redness, drainage, swelling or fever above 100°F.      MEDICATIONS  Continue to take all routine medications.  Our office may have instructed you to hold some medications. You may restart medications, including blood thinners.

## 2024-04-16 NOTE — INTERVAL H&P NOTE
H&P reviewed. After examining the patient I find no changes in the patients condition since the H&P had been written.    Vitals:    04/16/24 1220   BP: 141/92   Pulse: 85   Resp: 16   Temp: (!) 97.4 °F (36.3 °C)   SpO2: 98%

## 2024-04-23 ENCOUNTER — TELEPHONE (OUTPATIENT)
Dept: OBGYN CLINIC | Facility: HOSPITAL | Age: 69
End: 2024-04-23

## 2024-04-23 NOTE — TELEPHONE ENCOUNTER
Patient reports slight improvement post inj  Pain level 2/10 but throughout the day the pain increases.

## 2024-04-26 RX ORDER — SEMAGLUTIDE 2.4 MG/.75ML
2.4 INJECTION, SOLUTION SUBCUTANEOUS WEEKLY
Qty: 3 ML | Refills: 0 | Status: SHIPPED | OUTPATIENT
Start: 2024-04-26

## 2024-05-23 RX ORDER — SEMAGLUTIDE 2.4 MG/.75ML
2.4 INJECTION, SOLUTION SUBCUTANEOUS WEEKLY
Qty: 3 ML | Refills: 0 | Status: SHIPPED | OUTPATIENT
Start: 2024-05-23

## 2024-05-24 ENCOUNTER — TELEPHONE (OUTPATIENT)
Age: 69
End: 2024-05-24

## 2024-05-24 ENCOUNTER — HOSPITAL ENCOUNTER (OUTPATIENT)
Dept: RADIOLOGY | Facility: CLINIC | Age: 69
End: 2024-05-24
Payer: COMMERCIAL

## 2024-05-24 ENCOUNTER — OFFICE VISIT (OUTPATIENT)
Dept: PAIN MEDICINE | Facility: CLINIC | Age: 69
End: 2024-05-24
Payer: COMMERCIAL

## 2024-05-24 VITALS
HEART RATE: 82 BPM | SYSTOLIC BLOOD PRESSURE: 122 MMHG | OXYGEN SATURATION: 97 % | TEMPERATURE: 98 F | DIASTOLIC BLOOD PRESSURE: 84 MMHG | WEIGHT: 153 LBS | RESPIRATION RATE: 18 BRPM | BODY MASS INDEX: 28.16 KG/M2 | HEIGHT: 62 IN

## 2024-05-24 DIAGNOSIS — M25.512 CHRONIC LEFT SHOULDER PAIN: ICD-10-CM

## 2024-05-24 DIAGNOSIS — G89.29 CHRONIC LEFT SHOULDER PAIN: ICD-10-CM

## 2024-05-24 PROCEDURE — 73030 X-RAY EXAM OF SHOULDER: CPT

## 2024-05-24 PROCEDURE — 99214 OFFICE O/P EST MOD 30 MIN: CPT | Performed by: ANESTHESIOLOGY

## 2024-05-24 PROCEDURE — 76942 ECHO GUIDE FOR BIOPSY: CPT | Performed by: ANESTHESIOLOGY

## 2024-05-24 PROCEDURE — 20611 DRAIN/INJ JOINT/BURSA W/US: CPT | Performed by: ANESTHESIOLOGY

## 2024-05-24 RX ORDER — METHYLPREDNISOLONE ACETATE 40 MG/ML
40 INJECTION, SUSPENSION INTRA-ARTICULAR; INTRALESIONAL; INTRAMUSCULAR; SOFT TISSUE ONCE
Status: COMPLETED | OUTPATIENT
Start: 2024-05-24 | End: 2024-05-24

## 2024-05-24 RX ORDER — BUPIVACAINE HYDROCHLORIDE 2.5 MG/ML
4 INJECTION, SOLUTION EPIDURAL; INFILTRATION; INTRACAUDAL ONCE
Status: COMPLETED | OUTPATIENT
Start: 2024-05-24 | End: 2024-05-24

## 2024-05-24 RX ADMIN — METHYLPREDNISOLONE ACETATE 40 MG: 40 INJECTION, SUSPENSION INTRA-ARTICULAR; INTRALESIONAL; INTRAMUSCULAR; SOFT TISSUE at 12:48

## 2024-05-24 RX ADMIN — BUPIVACAINE HYDROCHLORIDE 4 ML: 2.5 INJECTION, SOLUTION EPIDURAL; INFILTRATION; INTRACAUDAL at 12:47

## 2024-05-24 NOTE — PATIENT INSTRUCTIONS
Steroid Joint Injection   WHAT YOU NEED TO KNOW:   A steroid joint injection is a procedure to inject steroid medicine into a joint. Steroid medicine decreases pain and inflammation. The injection may also contain an anesthetic (numbing medicine) to decrease pain. It may be done to treat conditions such as arthritis, gout, or carpal tunnel syndrome. The injections may be given in your knee, ankle, shoulder, elbow, or wrist. Injections may also be given in your hip, toe, thumb, or finger.  DISCHARGE INSTRUCTIONS:   Contact your healthcare provider if:   You have fever or chills.     You have redness or swelling at the injection site.     You have more pain than usual in your joint for more than 72 hours.     You have questions or concerns about your condition or care.    Medicines:   Pain medicine  may be given. Ask how to take this medicine safely.     Take your medicine as directed.  Contact your healthcare provider if you think your medicine is not helping or if you have side effects. Tell your provider if you are allergic to any medicine. Keep a list of the medicines, vitamins, and herbs you take. Include the amounts, and when and why you take them. Bring the list or the pill bottles to follow-up visits. Carry your medicine list with you in case of an emergency.    Self-care:   Leave the bandage on for 8 to 12 hours.  Care for your wound as directed.    Rest the area  as directed. You may need to decrease weight on certain joints, such as the knee, for a period of time. Ask when you can return to your daily activities.     Elevate  your limb where the steroid injection was given. Elevate the limb above the level of your heart as often as you can. This will help decrease swelling and pain. Prop your limb on pillows or blankets to keep it elevated comfortably.         Apply ice  on your joint for 15 to 20 minutes every hour or as directed. Use an ice pack, or put crushed ice in a plastic bag. Cover it with a towel.  Ice helps prevent tissue damage and decreases swelling and pain.    © Copyright [employer] 2023 Information is for End User's use only and may not be sold, redistributed or otherwise used for commercial purposes.  The above information is an  only. It is not intended as medical advice for individual conditions or treatments. Talk to your doctor, nurse or pharmacist before following any medical regimen to see if it is safe and effective for you.  Early Postoperative or Post Injury Shoulder Exercises   WHAT YOU NEED TO KNOW:   You may need to wait until your swelling and pain have gone down before you start to exercise. Do not start an exercise program before you talk to your healthcare provider.  DISCHARGE INSTRUCTIONS:   Call your doctor or physical therapist if:   You have sharp or worsening pain during exercise or at rest.    You have questions or concerns about your shoulder exercises.    Before you exercise:  Warm up and stretch before you exercise. Walk or ride a stationary bike for 5 to 10 minutes to help you warm up. Stretching helps increase range of motion. It may also decrease muscle soreness and help prevent another injury. Your healthcare provider or physical therapist will tell you which of the following stretches to do:  Crossover arm stretch:  Relax your shoulders. Hold your upper arm with the opposite hand. Pull your arm across your chest until you feel a stretch. Hold the stretch for 30 seconds. Return to the starting position.         Shoulder flexion stretch:  Stand facing a wall. Slowly walk your fingers up the wall until you feel a stretch. Hold the stretch for 30 seconds. Return to the starting position.         Sleeper stretch:  Lie on your injured side on a firm, flat surface. Bend the elbow of your injured arm 90° with your hand facing up. Use your arm that is not injured to slowly push your injured arm down. Stop when you feel a stretch at the back of your injured shoulder.  Hold the stretch for 30 seconds. Slowly return to the starting position.       How to perform exercises without a weight or an exercise band:   Pendulum swings:  Lean forward and rest the arm that is not injured on a table. Do not round your back or lock your knees during the exercise. Let your other arm hang freely by your side. Gently swing your injured arm forward and backward, side to side, and in circles.         Shrugs:  Stand with your arms by your side. Gently lift your shoulders up to your ears and hold for 5 seconds. With your shoulders lifted, pinch your shoulder blades together. Hold for 5 seconds. Slowly return to the starting position.       How to exercise with a weight:  Your healthcare provider or physical therapist will tell you how much weight to use. You may need to start with a light weight and work up to heavier weights. Hold a weight with your arm slightly in front of your body. Slowly raise your arm to the side with your thumb pointing up or down as directed. Stop when you reach the level of your shoulder. Hold for as many seconds as directed. Slowly return to the starting position.  How to exercise with an exercise band:  Your healthcare provider or physical therapist will tell you how much resistance to use.  Hold the exercise band with both hands in front of your body. Slowly raise your injured arm up and to the side with your thumb pointing up or down as directed. Stop when you reach the level of your shoulder. Hold for as many seconds as directed. Slowly return to the starting position.    Tie one end of the exercise band to a heavy object. Stand and hold the band in your hand. Bend your elbow. Keep your arm close to your side and pull the band straight back. Squeeze your shoulder blades together as you pull. Slowly return to the starting position.    Follow up with your doctor or physical therapist as directed:  Write down your questions so you remember to ask them at your visits.  ©  Copyright [employer] 2023 Information is for End User's use only and may not be sold, redistributed or otherwise used for commercial purposes.  The above information is an  only. It is not intended as medical advice for individual conditions or treatments. Talk to your doctor, nurse or pharmacist before following any medical regimen to see if it is safe and effective for you.

## 2024-05-24 NOTE — PROGRESS NOTES
Assessment  1. Chronic left shoulder pain  -     XR shoulder 2+ vw left; Future; Expected date: 05/24/2024  -     Diclofenac Sodium (VOLTAREN) 1 %; Apply 2 g topically 4 (four) times a day    Greater than 90% relief of pain with improved ability to participate with IADLs after T11-T12 ILESI. Mri reviewed showing Small right subarticular disc protrusion at levels T11-12 and T12-L1 mildly indenting right ventral thecal sac without significant canal stenosis which was contributory. Additionally she describes left shoulder pain with overhead maneuvers, pain with lying on that side. Ttp over left sits muscles, normal ROM, no significant pain at present with abduction/external rotation; xray noncontributory. Risks discussed with regard to joint  injection. Previously reported the following symptomatology:     Axial mid back pain described primarily by arthritic features with radicular pain in T7-T11 dermatome.  Aching, nagging, indolent, stabbing, throbbing features in axial mid back with radicular components.  5/5 strength bilaterally in upper and lower extremities, negative SLR.  Positive facet loading maneuvers in thoracic spine elicited pain, positive tenderness to palpation over thoracic paraspinal muscles more right sided.  xray thoracic spine noncontributory;however, considerable spondylosis noted in lumbar spine.  Currently she is neurologically intact without gait instability, saddle anesthesia or bowel/bladder abnormality. Risks, benefits and alternatives to KRYS, medial branch blocks and subsequent radiofrequency ablation if successful thoroughly discussed with patient.  Handouts provided questions answered to patient satisfaction.    The patient has been experiencing moderate to severe axial spine pain that is causing functional deficit.  The pain has been present for at least 3 months and is not improving with conservative care.  Currently the patient is not experiencing any radicular features nor  neurogenic claudication.  Non-facet pathology has been ruled out on clinical evaluation.      Plan  -left shoulder glenohumeral joint injection; f/u prn  -voltaren gel rx to be applied to left shoulder 4x daily prn pain  -tizanidine 4 mg t.i.d. prn muscle spasm ordered for patient; counseled regarding sedative effects of taking this medication and provided up titration calendar.  Counseled not to take medication while driving or operating heavy machinery/using stairs  -has completed formal physical therapy for right-sided mid back pain/thoracic spondylosis and muscle spasm; Physician directed home exercise plan as per [facility] demonstrated and handouts provided that patient plans to participate with for 1 hour, twice a week for the next 6 weeks.     Pre-procedure Diagnosis:   1. Shoulder Pain [LEFT]     Post-procedure Diagnosis:   1. Shoulder Pain [LEFT]        Operation Title(s):  1. [LEFT] shoulder glenohumeral joint steroid injection      2. Ultrasound  Attending Surgeon:   Cabrera Del Rio MD  Anesthesia:   Local    1. Shoulder Pain [LEFT]    The patient's history and physical exam were reviewed.   The risks, benefits and alternatives to the procedure were discussed, and all questions were answered to the patient's satisfaction. The patient agreed to proceed, and written informed consent was obtained.    Procedure in Detail: The patient was placed in the sitting position on the exam chair. The [LEFT] posterior shoulder was prepped with chloraprep times two and draped in a sterile manner.     Ultrasound was used to identify and yenni the [LEFT] glenohumeral joint. A 22-gauge, 3½-inch spinal needle was advanced toward the glenohumeral bursa under ultrrasound guidance until the area outside of the joint space was entered.  Then, after negative aspiration, a solution consisting of 4mL 0.25% bupivacaine and 1mL Depo-Medrol (40mg/ml) was easily injected. The needle was removed.    The patient's shoulder was cleaned and a  bandage was placed over the site of needle insertion.    Disposition: The patient tolerated the procedure well and there were no apparent complications.  The patient was taken to the recovery area where written discharge instructions for the procedure were given.    Estimated Blood Loss: None  Specimens Obtained: N/A      There are risks associated with opioid medications, including dependence, addiction and tolerance. The patient understands and agrees to use these medications only as prescribed. Potential side effects of the medications include, but are not limited to, constipation, drowsiness, addiction, impaired judgment and risk of fatal overdose if not taken as prescribed. The patient was warned against driving while taking sedation medications or operating heavy machinery. The patient voiced understanding. Sharing medications is a felony. At this point in time, the patient is showing no signs of addiction, abuse, diversion or suicidal ideation.     [place] Prescription Drug Monitoring Program report was reviewed and was appropriate      Complete risks and benefits including bleeding, infection, tissue reaction, nerve injury and allergic reaction were discussed. The approach was demonstrated using models and literature was provided. Verbal and written consent was obtained.     My impressions and treatment recommendations were discussed in detail with the patient who verbalized understanding and had no further questions.  Discharge instructions were provided. I personally saw and examined the patient and I agree with the above discussed plan of care.      New Medications Ordered This Visit   Medications    Diclofenac Sodium (VOLTAREN) 1 %     Sig: Apply 2 g topically 4 (four) times a day     Dispense:  150 g     Refill:  3       History of Present Illness    Greater than 90% relief of pain with improved ability to participate with IADLs after T11-T12 ILESI. Now describes left shoulder pain with overhead  maneuvers, pain with lying on that side. Ttp over left sits muscles, normal ROM, no significant pain at present with abduction/external rotation; xray noncontributory. Risks discussed with regard to joint  injection. Previously reported the following symptomatology:     Jennifer Carrillo is a 69 y.o. female with pmhx of prediabetes presenting with chronic mid back pain described primarily as arthritic in nature. She describes 8/10 ,od back pain that is worse in the mornings and worse at the end of the day.  The pain is characterized by achy, nagging, indolent, crampy, stabbing pain in her axial mid back.  The patient describes that the pain is worse with standing for long periods of time on hard surfaces as well as with walking.  The patient is a very active individual and feels as though this pain compromises his participation with independent activities of daily living. The pain can be debilitating at times and contribute to significant disability, compromising overall activity and independent activities of daily living.  She has not yet trialed PT formally.  Medications the patient has tried in the past include nsaids, tylenol. She describes b/l T7-T11 radicular symptoms and has good strength.  She denies any weakness numbness or paresthesias. The patient denies any bowel or bladder dysfunction, saddle anesthesia or gait instability as well.      I have personally reviewed and/or updated the patient's past medical history, past surgical history, family history, social history, current medications, allergies, and vital signs today.     Review of Systems   Constitutional:  Positive for activity change.   HENT: Negative.     Eyes: Negative.    Respiratory: Negative.     Cardiovascular: Negative.    Gastrointestinal: Negative.    Endocrine: Negative.    Genitourinary: Negative.    Musculoskeletal:  Positive for arthralgias, back pain and myalgias. Negative for gait problem.   Skin: Negative.    Allergic/Immunologic:  Negative.    Neurological:  Negative for weakness and numbness.   Hematological: Negative.    Psychiatric/Behavioral: Negative.     All other systems reviewed and are negative.      Patient Active Problem List   Diagnosis    Sphincter of Oddi dysfunction    Arthritis    Elevated hemoglobin A1c    Hepatic steatosis    Nasal congestion    Chest congestion    Tracheitis    Thoracic radiculitis       Past Medical History:   Diagnosis Date    Allergic Several years ago    Arthritis Several years ago    Bronchitis     Elevated hemoglobin A1c     Pt states she does not take medication but is trying to lower with diet and exercise ( 7.1)    Kidney stone Several years ago    Lipoma of arm     LUE    Obesity Several years ago    Sphincter of Oddi dysfunction        Past Surgical History:   Procedure Laterality Date    BLADDER SUSPENSION      BREAST EXCISIONAL BIOPSY Left 2000    benign    CHOLECYSTECTOMY      COLONOSCOPY      HYSTERECTOMY  1980    IR SPINE AND PAIN PROCEDURE  4/16/2024    OOPHORECTOMY Bilateral 1980    NH EXC B9 LESION MRGN XCP SK TG T/A/L 3.1-4.0 CM Left 06/03/2022    Procedure: UPPER ARM MASS EXCISION;  Surgeon: Krys Mendiola DO;  Location: [facility];  Service: General    RECTAL PROLAPSE REPAIR         Family History   Problem Relation Age of Onset    No Known Problems Mother     Diabetes Father     No Known Problems Sister     No Known Problems Daughter     Leukemia Maternal Grandmother     No Known Problems Maternal Grandfather     No Known Problems Paternal Grandmother     No Known Problems Paternal Grandfather     No Known Problems Maternal Aunt     No Known Problems Maternal Aunt     No Known Problems Paternal Aunt     No Known Problems Paternal Aunt     No Known Problems Sister     No Known Problems Daughter     BRCA2 Positive Neg Hx     BRCA2 Negative Neg Hx     BRCA1 Positive Neg Hx     BRCA1 Negative Neg Hx     BRCA 1/2 Neg Hx     Ovarian cancer Neg Hx     Endometrial cancer Neg Hx     Colon  "cancer Neg Hx     Breast cancer additional onset Neg Hx     Breast cancer Neg Hx        Social History     Occupational History    Not on file   Tobacco Use    Smoking status: Former     Current packs/day: 0.00     Average packs/day: 0.5 packs/day for 21.4 years (10.7 ttl pk-yrs)     Types: Cigarettes     Start date: 1972     Quit date: 1993     Years since quittin.0     Passive exposure: Never    Smokeless tobacco: Never   Vaping Use    Vaping status: Never Used   Substance and Sexual Activity    Alcohol use: Never    Drug use: Never    Sexual activity: Not Currently     Partners: Male     Birth control/protection: None       Current Outpatient Medications on File Prior to Visit   Medication Sig    Semaglutide-Weight Management (Wegovy) 2.4 MG/0.75ML Inject 0.75 mL (2.4 mg total) under the skin once a week Inject under the skin    Sennosides (Laxative Pills) 25 MG TABS Take 2 tablets by mouth daily at bedtime    tiZANidine (ZANAFLEX) 4 mg tablet Take 1 tablet (4 mg total) by mouth every 8 (eight) hours as needed for muscle spasms (Patient taking differently: Take 4 mg by mouth every 8 (eight) hours as needed for muscle spasms PRN)     No current facility-administered medications on file prior to visit.       Allergies   Allergen Reactions    Cayenne - Food Allergy Shortness Of Breath     Pt states she coughs and then can't take breaths in    Pineapple - Food Allergy Shortness Of Breath     Pt states she has a terrible cough and can't breath in    Demerol [Meperidine] Itching    Oxycodone Itching    Propoxyphene Itching    Tegaderm Alginate Ag Rope Rash and Swelling         Physical Exam    /84 (BP Location: Left arm, Patient Position: Sitting, Cuff Size: Adult)   Pulse 82   Temp 98 °F (36.7 °C)   Resp 18   Ht 5' 2\" (1.575 m)   Wt 69.4 kg (153 lb)   SpO2 97%   BMI 27.98 kg/m²     Constitutional: normal, well developed, well nourished, alert, in no distress and non-toxic and no overt pain " behavior. and overweight  Eyes: anicteric  HEENT: grossly intact  Neck: supple, symmetric, trachea midline and no masses   Pulmonary:even and unlabored  Cardiovascular:No edema or pitting edema present  Skin:Normal without rashes or lesions and well hydrated  Psychiatric:Mood and affect appropriate  Neurologic:Cranial Nerves II-XII grossly intact Sensation grossly intact; no clonus negative romero's. Reflexes 2+ and brisk. SLR negative bilaterally. Spurling's maneuver negative bilaterally.  Musculoskeletal:normal gait. 5/5 strength bilaterally with AROM in all extremities. Normal heel toe and tip toe walking. Significant pain with thoracic facet loading bilaterally and with lateral spine rotation, right greater than left. ttp over right sided thoracic paraspinal muscles. Negative bhavin's test, negative gaenslen's negative SIJ loading bilaterally.    Imaging    MRI THORACIC SPINE WITHOUT CONTRAST     INDICATION: M47.814: Spondylosis without myelopathy or radiculopathy, thoracic region.     COMPARISON: X-ray thoracic spine 5/9/2022     TECHNIQUE:  Multiplanar, multisequence imaging of the thoracic spine was performed. .     IMAGE QUALITY: Diagnostic.     FINDINGS:     ALIGNMENT: Alignment is unremarkable.     MARROW SIGNAL: No bone marrow signal abnormality or suspicious discrete lesion.     THORACIC CORD: Normal signal within the thoracic cord.     PARAVERTEBRAL SOFT TISSUES:  Normal.     THORACIC DEGENERATIVE CHANGE:     Small right subarticular disc protrusion at levels T11-12 and T12-L1 mildly indenting right ventral thecal sac without significant canal stenosis.  Minimal disc bulge at the levels T7 through T11 with tiny disc protrusions (left subarticular T7-8 and right subarticular T8-9). There is minor canal narrowing at these levels.     Incompletely evaluated (partially imaged) degenerative narrowing in the mid to low cervical spine.     OTHER FINDINGS: Right renal cysts.     IMPRESSION:     Mild  degenerative change in the mid to lower thoracic spine as described. No high-grade canal or foraminal stenosis.

## 2024-05-24 NOTE — TELEPHONE ENCOUNTER
PA for DICLO 1%    Submitted via    []CMM-KEY   [x][employer]-Case ID #  Case ID: [id number]    []Faxed to plan   []Other website   []Phone call Case ID #     Office notes sent, clinical questions answered. Awaiting determination    Turnaround time for your insurance to make a decision on your Prior Authorization can take 7-21 business days.

## 2024-06-17 RX ORDER — SEMAGLUTIDE 2.4 MG/.75ML
2.4 INJECTION, SOLUTION SUBCUTANEOUS WEEKLY
Qty: 3 ML | Refills: 0 | Status: SHIPPED | OUTPATIENT
Start: 2024-06-17

## 2024-07-14 DIAGNOSIS — M79.18 MYOFASCIAL PAIN SYNDROME: ICD-10-CM

## 2024-07-14 DIAGNOSIS — M47.814 THORACIC SPONDYLOSIS: ICD-10-CM

## 2024-07-14 DIAGNOSIS — M54.9 BACK PAIN: ICD-10-CM

## 2024-07-15 RX ORDER — SEMAGLUTIDE 2.4 MG/.75ML
2.4 INJECTION, SOLUTION SUBCUTANEOUS WEEKLY
Qty: 3 ML | Refills: 0 | Status: SHIPPED | OUTPATIENT
Start: 2024-07-15

## 2024-07-15 RX ORDER — TIZANIDINE 4 MG/1
4 TABLET ORAL EVERY 8 HOURS PRN
Qty: 90 TABLET | Refills: 0 | Status: SHIPPED | OUTPATIENT
Start: 2024-07-15

## 2024-08-07 RX ORDER — SEMAGLUTIDE 2.4 MG/.75ML
2.4 INJECTION, SOLUTION SUBCUTANEOUS WEEKLY
Qty: 3 ML | Refills: 0 | Status: SHIPPED | OUTPATIENT
Start: 2024-08-07

## 2024-09-03 ENCOUNTER — TELEPHONE (OUTPATIENT)
Age: 69
End: 2024-09-03

## 2024-09-03 RX ORDER — SEMAGLUTIDE 2.4 MG/.75ML
2.4 INJECTION, SOLUTION SUBCUTANEOUS WEEKLY
Qty: 3 ML | Refills: 0 | Status: SHIPPED | OUTPATIENT
Start: 2024-09-03

## 2024-09-03 NOTE — TELEPHONE ENCOUNTER
PA for Semaglutide (Wegovy) 2.4 MG/0.75MLSUBMITTED     via    []CMM-KEY:   [x][place]-Case ID # [id number]   []Faxed to plan   []Other website   []Phone call Case ID #     Office notes sent, clinical questions answered. Awaiting determination    Turnaround time for your insurance to make a decision on your Prior Authorization can take 7-21 business days.

## 2024-09-04 NOTE — TELEPHONE ENCOUNTER
PA for Semaglutide (Wegovy) 2.4 MG/0.75ML RESUBMITTED     via    [x]CMM-KEY: [id number]  [][employer]-Case ID #   []Faxed to plan   []Other website   []Phone call Case ID #     Office notes sent, clinical questions answered. Awaiting determination    Turnaround time for your insurance to make a decision on your Prior Authorization can take 7-21 business days.

## 2024-09-05 NOTE — TELEPHONE ENCOUNTER
PA for Semaglutide (Wegovy) 2.4 MG/0.75ML APPROVED     Date(s) approved 9/4/24 to 9/4/25    Case # [id number]     Patient advised by          []MyChart Message  [x]Phone call   []LMOM  []L/M to call office as no active Communication consent on file  []Unable to leave detailed message as VM not approved on Communication consent       Pharmacy advised by    [x]Fax  []Phone call    Approval letter scanned into [employer] Yes

## 2024-09-17 ENCOUNTER — APPOINTMENT (EMERGENCY)
Dept: CT IMAGING | Facility: HOSPITAL | Age: 69
End: 2024-09-17
Payer: COMMERCIAL

## 2024-09-17 ENCOUNTER — HOSPITAL ENCOUNTER (EMERGENCY)
Facility: HOSPITAL | Age: 69
Discharge: HOME | End: 2024-09-17
Attending: EMERGENCY MEDICINE
Payer: COMMERCIAL

## 2024-09-17 VITALS
TEMPERATURE: 97.2 F | RESPIRATION RATE: 16 BRPM | BODY MASS INDEX: 28.83 KG/M2 | SYSTOLIC BLOOD PRESSURE: 152 MMHG | DIASTOLIC BLOOD PRESSURE: 96 MMHG | OXYGEN SATURATION: 97 % | HEART RATE: 83 BPM | WEIGHT: 157.63 LBS

## 2024-09-17 DIAGNOSIS — I10 HTN (HYPERTENSION): Primary | ICD-10-CM

## 2024-09-17 DIAGNOSIS — R04.0 EPISTAXIS: ICD-10-CM

## 2024-09-17 DIAGNOSIS — E87.6 HYPOKALEMIA: ICD-10-CM

## 2024-09-17 DIAGNOSIS — R51.9 HEADACHE: ICD-10-CM

## 2024-09-17 LAB
ALBUMIN SERPL BCG-MCNC: 4.4 G/DL (ref 3.5–5)
ALP SERPL-CCNC: 71 U/L (ref 34–104)
ALT SERPL W P-5'-P-CCNC: 15 U/L (ref 7–52)
ANION GAP SERPL CALCULATED.3IONS-SCNC: 7 MMOL/L (ref 4–13)
APTT PPP: 25 SECONDS (ref 23–34)
AST SERPL W P-5'-P-CCNC: 18 U/L (ref 13–39)
ATRIAL RATE: 85 BPM
BASOPHILS # BLD AUTO: 0.06 THOUSANDS/ΜL (ref 0–0.1)
BASOPHILS NFR BLD AUTO: 1 % (ref 0–1)
BILIRUB SERPL-MCNC: 0.68 MG/DL (ref 0.2–1)
BNP SERPL-MCNC: 14 PG/ML (ref 0–100)
BUN SERPL-MCNC: 18 MG/DL (ref 5–25)
CALCIUM SERPL-MCNC: 9.4 MG/DL (ref 8.4–10.2)
CARDIAC TROPONIN I PNL SERPL HS: 3 NG/L
CHLORIDE SERPL-SCNC: 107 MMOL/L (ref 96–108)
CO2 SERPL-SCNC: 25 MMOL/L (ref 21–32)
CREAT SERPL-MCNC: 0.9 MG/DL (ref 0.6–1.3)
EOSINOPHIL # BLD AUTO: 0.14 THOUSAND/ΜL (ref 0–0.61)
EOSINOPHIL NFR BLD AUTO: 2 % (ref 0–6)
ERYTHROCYTE [DISTWIDTH] IN BLOOD BY AUTOMATED COUNT: 12.7 % (ref 11.6–15.1)
GFR SERPL CREATININE-BSD FRML MDRD: 65 ML/MIN/1.73SQ M
GLUCOSE SERPL-MCNC: 121 MG/DL (ref 65–140)
HCT VFR BLD AUTO: 45.7 % (ref 34.8–46.1)
HGB BLD-MCNC: 15.5 G/DL (ref 11.5–15.4)
IMM GRANULOCYTES # BLD AUTO: 0.02 THOUSAND/UL (ref 0–0.2)
IMM GRANULOCYTES NFR BLD AUTO: 0 % (ref 0–2)
INR PPP: 0.85 (ref 0.85–1.19)
LYMPHOCYTES # BLD AUTO: 2.62 THOUSANDS/ΜL (ref 0.6–4.47)
LYMPHOCYTES NFR BLD AUTO: 35 % (ref 14–44)
MAGNESIUM SERPL-MCNC: 2.1 MG/DL (ref 1.9–2.7)
MCH RBC QN AUTO: 29.4 PG (ref 26.8–34.3)
MCHC RBC AUTO-ENTMCNC: 33.9 G/DL (ref 31.4–37.4)
MCV RBC AUTO: 87 FL (ref 82–98)
MONOCYTES # BLD AUTO: 0.57 THOUSAND/ΜL (ref 0.17–1.22)
MONOCYTES NFR BLD AUTO: 8 % (ref 4–12)
NEUTROPHILS # BLD AUTO: 4.14 THOUSANDS/ΜL (ref 1.85–7.62)
NEUTS SEG NFR BLD AUTO: 54 % (ref 43–75)
NRBC BLD AUTO-RTO: 0 /100 WBCS
PLATELET # BLD AUTO: 191 THOUSANDS/UL (ref 149–390)
PMV BLD AUTO: 9.2 FL (ref 8.9–12.7)
POTASSIUM SERPL-SCNC: 3.3 MMOL/L (ref 3.5–5.3)
PR INTERVAL: 168 MS
PROT SERPL-MCNC: 6.9 G/DL (ref 6.4–8.4)
PROTHROMBIN TIME: 12 SECONDS (ref 12.3–15)
QRS AXIS: -62 DEGREES
QRSD INTERVAL: 84 MS
QT INTERVAL: 304 MS
QTC INTERVAL: 361 MS
RBC # BLD AUTO: 5.28 MILLION/UL (ref 3.81–5.12)
SODIUM SERPL-SCNC: 139 MMOL/L (ref 135–147)
T WAVE AXIS: 22 DEGREES
VENTRICULAR RATE: 85 BPM
WBC # BLD AUTO: 7.55 THOUSAND/UL (ref 4.31–10.16)

## 2024-09-17 PROCEDURE — 83880 ASSAY OF NATRIURETIC PEPTIDE: CPT | Performed by: EMERGENCY MEDICINE

## 2024-09-17 PROCEDURE — 85025 COMPLETE CBC W/AUTO DIFF WBC: CPT | Performed by: EMERGENCY MEDICINE

## 2024-09-17 PROCEDURE — 80053 COMPREHEN METABOLIC PANEL: CPT | Performed by: EMERGENCY MEDICINE

## 2024-09-17 PROCEDURE — 85610 PROTHROMBIN TIME: CPT | Performed by: EMERGENCY MEDICINE

## 2024-09-17 PROCEDURE — 84484 ASSAY OF TROPONIN QUANT: CPT | Performed by: EMERGENCY MEDICINE

## 2024-09-17 PROCEDURE — 70450 CT HEAD/BRAIN W/O DYE: CPT

## 2024-09-17 PROCEDURE — 83735 ASSAY OF MAGNESIUM: CPT | Performed by: EMERGENCY MEDICINE

## 2024-09-17 PROCEDURE — 99285 EMERGENCY DEPT VISIT HI MDM: CPT | Performed by: EMERGENCY MEDICINE

## 2024-09-17 PROCEDURE — 36415 COLL VENOUS BLD VENIPUNCTURE: CPT | Performed by: EMERGENCY MEDICINE

## 2024-09-17 PROCEDURE — 93005 ELECTROCARDIOGRAM TRACING: CPT

## 2024-09-17 PROCEDURE — 85730 THROMBOPLASTIN TIME PARTIAL: CPT | Performed by: EMERGENCY MEDICINE

## 2024-09-17 PROCEDURE — 99284 EMERGENCY DEPT VISIT MOD MDM: CPT

## 2024-09-17 RX ORDER — POTASSIUM CHLORIDE 1500 MG/1
20 TABLET, EXTENDED RELEASE ORAL ONCE
Status: COMPLETED | OUTPATIENT
Start: 2024-09-17 | End: 2024-09-17

## 2024-09-17 RX ORDER — CLONIDINE HYDROCHLORIDE 0.1 MG/1
0.1 TABLET ORAL ONCE
Status: COMPLETED | OUTPATIENT
Start: 2024-09-17 | End: 2024-09-17

## 2024-09-17 RX ORDER — AMLODIPINE BESYLATE 10 MG/1
10 TABLET ORAL DAILY
Qty: 20 TABLET | Refills: 0 | Status: SHIPPED | OUTPATIENT
Start: 2024-09-17 | End: 2024-10-07

## 2024-09-17 RX ORDER — ACETAMINOPHEN 325 MG/1
975 TABLET ORAL ONCE
Status: COMPLETED | OUTPATIENT
Start: 2024-09-17 | End: 2024-09-17

## 2024-09-17 RX ADMIN — POTASSIUM CHLORIDE 20 MEQ: 1500 TABLET, EXTENDED RELEASE ORAL at 06:10

## 2024-09-17 RX ADMIN — CLONIDINE HYDROCHLORIDE 0.1 MG: 0.1 TABLET ORAL at 05:34

## 2024-09-17 RX ADMIN — ACETAMINOPHEN 325MG 975 MG: 325 TABLET ORAL at 05:39

## 2024-09-17 NOTE — ED PROVIDER NOTES
1. HTN (hypertension)    2. Hypokalemia    3. Headache    4. Epistaxis      ED Disposition       ED Disposition   Discharge    Condition   Stable    Date/Time   Tue Sep 17, 2024  6:14 AM    Comment   Jennifer Carrillo discharge to home/self care.                   Assessment & Plan       Medical Decision Making  Differential diagnosis included but not limited to hypertensive urgency hypertensive emergency epistaxis intracranial hemorrhage stroke.  Patient remained clinically hemodynamically stable in the emergency department normal nonfocal neurologic examination in the ED blood pressure improved with oral medication given in the ED no evidence of hypertensive urgency or hypertensive emergency present.  No active epistaxis in the ED suspect all symptoms related to new diagnosis of hypertension.  For now we will start on oral antihypertensive medication advised prompt follow-up with primary physician for further evaluation and treatment and obtain test results. return precautions and anticipatory guidance discussed.      Problems Addressed:  Epistaxis: acute illness or injury  Headache: acute illness or injury  HTN (hypertension): acute illness or injury  Hypokalemia: acute illness or injury    Amount and/or Complexity of Data Reviewed  Labs: ordered. Decision-making details documented in ED Course.  Radiology: ordered. Decision-making details documented in ED Course.  ECG/medicine tests: ordered and independent interpretation performed. Decision-making details documented in ED Course.    Risk  OTC drugs.  Prescription drug management.                         Medications   cloNIDine (CATAPRES) tablet 0.1 mg (0.1 mg Oral Given 9/17/24 0534)   acetaminophen (TYLENOL) tablet 975 mg (975 mg Oral Given 9/17/24 0539)   potassium chloride (Klor-Con M20) CR tablet 20 mEq (20 mEq Oral Given 9/17/24 0610)       History of Present Illness       Patient is a 69-year-old female no significant past medical history presents to the  emergency department complaining of headaches dizziness nosebleeds and blurry vision has been ongoing intermittently for about 3 weeks denies any chest pain or shortness of breath.        History provided by:  Patient  Dizziness  Severity:  Moderate  Onset quality:  Gradual  Duration:  3 weeks  Timing:  Intermittent  Progression:  Waxing and waning  Chronicity:  Recurrent  Associated symptoms: headaches    Associated symptoms: no chest pain, no nausea, no shortness of breath, no vomiting and no weakness            Review of Systems   Constitutional:  Negative for fever.   HENT:  Positive for nosebleeds.    Eyes:  Positive for visual disturbance.   Respiratory:  Negative for shortness of breath.    Cardiovascular:  Negative for chest pain.   Gastrointestinal:  Negative for abdominal pain, nausea and vomiting.   Neurological:  Positive for dizziness and headaches. Negative for weakness and numbness.   All other systems reviewed and are negative.          Objective     ED Triage Vitals   Temperature Pulse Blood Pressure Respirations SpO2 Patient Position - Orthostatic VS   09/17/24 0525 09/17/24 0525 09/17/24 0525 09/17/24 0525 09/17/24 0525 09/17/24 0525   (!) 97.2 °F (36.2 °C) 88 (!) 199/109 18 98 % Lying      Temp Source Heart Rate Source BP Location FiO2 (%) Pain Score    09/17/24 0525 09/17/24 0525 09/17/24 0525 -- 09/17/24 0535    Temporal Monitor Right arm  2        Physical Exam  Vitals and nursing note reviewed.   Constitutional:       General: She is not in acute distress.     Appearance: Normal appearance.   HENT:      Head: Normocephalic and atraumatic.      Nose: Nose normal.      Right Nostril: No septal hematoma.      Comments: Friable blood vessel and excoriation right anterior nasal septal mucosa no active bleeding  Eyes:      Extraocular Movements: Extraocular movements intact.      Conjunctiva/sclera: Conjunctivae normal.      Pupils: Pupils are equal, round, and reactive to light.   Cardiovascular:       Rate and Rhythm: Normal rate and regular rhythm.   Pulmonary:      Effort: Pulmonary effort is normal. No respiratory distress.   Skin:     General: Skin is dry.   Neurological:      General: No focal deficit present.      Mental Status: She is alert and oriented to person, place, and time.      Sensory: No sensory deficit.      Motor: No weakness.         Labs Reviewed   CBC AND DIFFERENTIAL - Abnormal       Result Value    WBC 7.55      RBC 5.28 (*)     Hemoglobin 15.5 (*)     Hematocrit 45.7      MCV 87      MCH 29.4      MCHC 33.9      RDW 12.7      MPV 9.2      Platelets 191      nRBC 0      Segmented % 54      Immature Grans % 0      Lymphocytes % 35      Monocytes % 8      Eosinophils Relative 2      Basophils Relative 1      Absolute Neutrophils 4.14      Absolute Immature Grans 0.02      Absolute Lymphocytes 2.62      Absolute Monocytes 0.57      Eosinophils Absolute 0.14      Basophils Absolute 0.06     PROTIME-INR - Abnormal    Protime 12.0 (*)     INR 0.85      Narrative:     INR Therapeutic Range    Indication                                             INR Range      Atrial Fibrillation                                               2.0-3.0  Hypercoagulable State                                    2.0.2.3  Left Ventricular Asist Device                            2.0-3.0  Mechanical Heart Valve                                  -    Aortic(with afib, MI, embolism, HF, LA enlargement,    and/or coagulopathy)                                     2.0-3.0 (2.5-3.5)     Mitral                                                             2.5-3.5  Prosthetic/Bioprosthetic Heart Valve               2.0-3.0  Venous thromboembolism (VTE: VT, PE        2.0-3.0   COMPREHENSIVE METABOLIC PANEL - Abnormal    Sodium 139      Potassium 3.3 (*)     Chloride 107      CO2 25      ANION GAP 7      BUN 18      Creatinine 0.90      Glucose 121      Calcium 9.4      AST 18      ALT 15      Alkaline Phosphatase 71      Total  Protein 6.9      Albumin 4.4      Total Bilirubin 0.68      eGFR 65      Narrative:     National Kid[employer] Disease Foundation guidelines for Chronic Kidney Disease (CKD):     Stage 1 with normal or high GFR (GFR > 90 mL/min/1.73 square meters)    Stage 2 Mild CKD (GFR = 60-89 mL/min/1.73 square meters)    Stage 3A Moderate CKD (GFR = 45-59 mL/min/1.73 square meters)    Stage 3B Moderate CKD (GFR = 30-44 mL/min/1.73 square meters)    Stage 4 Severe CKD (GFR = 15-29 mL/min/1.73 square meters)    Stage 5 End Stage CKD (GFR <15 mL/min/1.73 square meters)  Note: GFR calculation is accurate only with a steady state creatinine   APTT - Normal    PTT 25     HS TROPONIN I 0HR - Normal    hs TnI 0hr 3     B-TYPE NATRIURETIC PEPTIDE (BNP) - Normal    BNP 14     MAGNESIUM - Normal    Magnesium 2.1     HS TROPONIN I 2HR     CT head without contrast   Final Interpretation by Minesh Jackson MD (09/17 0611)      No acute intracranial abnormality.                  Workstation performed: [id number]             ECG 12 Lead Documentation Only    Date/Time: 9/17/2024 5:28 AM    Performed by: Raheel Armendariz DO  Authorized by: Raheel Armendariz DO    ECG reviewed by me, the ED Provider: yes    Patient location:  ED  Previous ECG:     Comparison to cardiac monitor: Yes    Quality:     Tracing quality:  Limited by artifact  Rate:     ECG rate:  85    ECG rate assessment: normal    Rhythm:     Rhythm: sinus rhythm    QRS:     QRS axis:  Left    QRS intervals:  Normal  Conduction:     Conduction: normal    ST segments:     ST segments:  Normal  T waves:     T waves: non-specific and flattening           Raheel Armendariz DO  09/17/24 0610       Raheel Armendariz DO  09/17/24 0616

## 2024-09-18 ENCOUNTER — OFFICE VISIT (OUTPATIENT)
Dept: FAMILY MEDICINE CLINIC | Facility: CLINIC | Age: 69
End: 2024-09-18
Payer: COMMERCIAL

## 2024-09-18 ENCOUNTER — APPOINTMENT (OUTPATIENT)
Dept: LAB | Facility: CLINIC | Age: 69
End: 2024-09-18
Payer: COMMERCIAL

## 2024-09-18 VITALS
OXYGEN SATURATION: 96 % | DIASTOLIC BLOOD PRESSURE: 82 MMHG | SYSTOLIC BLOOD PRESSURE: 130 MMHG | BODY MASS INDEX: 27.71 KG/M2 | WEIGHT: 151.5 LBS | HEART RATE: 76 BPM

## 2024-09-18 DIAGNOSIS — R53.83 OTHER FATIGUE: ICD-10-CM

## 2024-09-18 DIAGNOSIS — Z12.31 ENCOUNTER FOR SCREENING MAMMOGRAM FOR MALIGNANT NEOPLASM OF BREAST: ICD-10-CM

## 2024-09-18 DIAGNOSIS — R79.89 LOW VITAMIN D LEVEL: ICD-10-CM

## 2024-09-18 DIAGNOSIS — D58.2 ELEVATED HEMOGLOBIN (HCC): ICD-10-CM

## 2024-09-18 DIAGNOSIS — R03.0 ELEVATED BLOOD PRESSURE READING: Primary | ICD-10-CM

## 2024-09-18 LAB
25(OH)D3 SERPL-MCNC: 22.7 NG/ML (ref 30–100)
FERRITIN SERPL-MCNC: 94 NG/ML (ref 11–307)
FOLATE SERPL-MCNC: 7.3 NG/ML
IRON SATN MFR SERPL: 30 % (ref 15–50)
IRON SERPL-MCNC: 96 UG/DL (ref 50–212)
TIBC SERPL-MCNC: 315 UG/DL (ref 250–450)
UIBC SERPL-MCNC: 219 UG/DL (ref 155–355)
VIT B12 SERPL-MCNC: 296 PG/ML (ref 180–914)

## 2024-09-18 PROCEDURE — 99214 OFFICE O/P EST MOD 30 MIN: CPT | Performed by: NURSE PRACTITIONER

## 2024-09-18 PROCEDURE — 83540 ASSAY OF IRON: CPT

## 2024-09-18 PROCEDURE — 36415 COLL VENOUS BLD VENIPUNCTURE: CPT

## 2024-09-18 PROCEDURE — 83550 IRON BINDING TEST: CPT

## 2024-09-18 PROCEDURE — 82306 VITAMIN D 25 HYDROXY: CPT

## 2024-09-18 PROCEDURE — 82728 ASSAY OF FERRITIN: CPT

## 2024-09-18 PROCEDURE — 82607 VITAMIN B-12: CPT

## 2024-09-18 PROCEDURE — 82746 ASSAY OF FOLIC ACID SERUM: CPT

## 2024-09-18 NOTE — PROGRESS NOTES
Ambulatory Visit  Name: Jennifer Carrillo      : 1955      MRN: [id number]  Encounter Provider: MARK Chatman  Encounter Date: 2024   Encounter department: [facility]    Assessment & Plan  Elevated blood pressure reading  BP today in normal range - she will begin to check her BP daily and will stop the 5 hour energy drinks.            Encounter for screening mammogram for malignant neoplasm of breast    Orders:    Mammo screening bilateral w 3d and cad; Future    Other fatigue  Due to her taking the energy drinks due to ongoing fatigue will check labs.    Orders:    Vitamin D 25 hydroxy; Future    Vitamin B12; Future    Folate; Future    Iron Panel (Includes Ferritin, Iron Sat%, Iron, and TIBC); Future    Low vitamin D level    Orders:    Vitamin D 25 hydroxy; Future    Vitamin B12; Future    Folate; Future    Elevated hemoglobin (HCC)    Orders:    Iron Panel (Includes Ferritin, Iron Sat%, Iron, and TIBC); Future       History of Present Illness     Here for an ED follow-up.  Was seen yesterday due to headaches, nose bleeds on the right side, and found to have elevated blood pressure.  She was prescribed amlodipine but has not started it as he BP today was in normal range.  She has no past hx of elevated BP.  She is on Wegovy and has maintained her recent weight loss.      She does report that she was taking 5 hour energy drinks for the past three weeks - notes of increased fatigue recently.  States she has a hx of low vitamin D in the past - is currently not taking any supplementation.          History obtained from : patient  Review of Systems   Constitutional: Negative.    Respiratory: Negative.     Cardiovascular: Negative.    Neurological:  Positive for headaches.   All other systems reviewed and are negative.    Current Outpatient Medications on File Prior to Visit   Medication Sig Dispense Refill    amLODIPine (NORVASC) 10 mg tablet Take 1 tablet (10 mg total) by  mouth daily for 20 doses (Patient not taking: Reported on 9/18/2024) 20 tablet 0    Semaglutide-Weight Management (Wegovy) 2.4 MG/0.75ML Inject 0.75 mL (2.4 mg total) under the skin once a week 3 mL 0    Sennosides (Laxative Pills) 25 MG TABS Take 2 tablets by mouth daily at bedtime      tiZANidine (ZANAFLEX) 4 mg tablet Take 1 tablet (4 mg total) by mouth every 8 (eight) hours as needed for muscle spasms 90 tablet 0    [DISCONTINUED] Diclofenac Sodium (VOLTAREN) 1 % Apply 2 g topically 4 (four) times a day 150 g 3     No current facility-administered medications on file prior to visit.          Objective     /82 (BP Location: Right arm, Patient Position: Sitting, Cuff Size: Large)   Pulse 76   Wt 68.7 kg (151 lb 8 oz)   SpO2 96%   BMI 27.71 kg/m²     Physical Exam  Neurological:      General: No focal deficit present.      Mental Status: She is alert and oriented to person, place, and time.      Cranial Nerves: No cranial nerve deficit.      Motor: No weakness.      Coordination: Coordination normal.      Gait: Gait normal.

## 2024-10-02 RX ORDER — SEMAGLUTIDE 2.4 MG/.75ML
2.4 INJECTION, SOLUTION SUBCUTANEOUS WEEKLY
Qty: 3 ML | Refills: 0 | Status: SHIPPED | OUTPATIENT
Start: 2024-10-02 | End: 2024-10-04 | Stop reason: SDUPTHER

## 2024-10-03 ENCOUNTER — TELEPHONE (OUTPATIENT)
Age: 69
End: 2024-10-03

## 2024-10-03 NOTE — TELEPHONE ENCOUNTER
S/w pt.     4/16 she had T11-12 IESI  pt notes she had 85-90% relief until 3 weeks ago.  Notes more constant pain now rating 8-9/10 (would like the back addressed first)    5/25 she was f/u in the office and had left should joint injection.   States she had almost 100% relief until 2 weeks ago. This pain comes and comes but is 5/10 at the least    Please advise regarding repeating both injections

## 2024-10-03 NOTE — TELEPHONE ENCOUNTER
Caller: elmer    Doctor: db    Reason for call: pt would like to get an injection for her back and shoulder.    Call back#: [phone]

## 2024-10-04 ENCOUNTER — OFFICE VISIT (OUTPATIENT)
Dept: FAMILY MEDICINE CLINIC | Facility: CLINIC | Age: 69
End: 2024-10-04
Payer: COMMERCIAL

## 2024-10-04 ENCOUNTER — APPOINTMENT (OUTPATIENT)
Dept: RADIOLOGY | Facility: CLINIC | Age: 69
End: 2024-10-04
Payer: COMMERCIAL

## 2024-10-04 VITALS
WEIGHT: 151.4 LBS | OXYGEN SATURATION: 98 % | DIASTOLIC BLOOD PRESSURE: 68 MMHG | SYSTOLIC BLOOD PRESSURE: 124 MMHG | HEART RATE: 81 BPM | BODY MASS INDEX: 27.86 KG/M2 | HEIGHT: 62 IN

## 2024-10-04 DIAGNOSIS — I10 HTN (HYPERTENSION): Primary | ICD-10-CM

## 2024-10-04 DIAGNOSIS — R23.2 HOT FLASHES: ICD-10-CM

## 2024-10-04 DIAGNOSIS — Z78.0 POSTMENOPAUSAL: ICD-10-CM

## 2024-10-04 DIAGNOSIS — R09.81 NASAL CONGESTION: ICD-10-CM

## 2024-10-04 PROCEDURE — 70150 X-RAY EXAM OF FACIAL BONES: CPT

## 2024-10-04 PROCEDURE — 99214 OFFICE O/P EST MOD 30 MIN: CPT | Performed by: NURSE PRACTITIONER

## 2024-10-04 RX ORDER — SEMAGLUTIDE 2.4 MG/.75ML
2.4 INJECTION, SOLUTION SUBCUTANEOUS WEEKLY
Qty: 3 ML | Refills: 0 | Status: SHIPPED | OUTPATIENT
Start: 2024-10-04

## 2024-10-04 RX ORDER — AMLODIPINE BESYLATE 10 MG/1
10 TABLET ORAL DAILY
Qty: 90 TABLET | Refills: 1 | Status: SHIPPED | OUTPATIENT
Start: 2024-10-04

## 2024-10-04 RX ORDER — ESTRADIOL 0.5 MG/1
0.5 TABLET ORAL DAILY
Qty: 30 TABLET | Refills: 1 | Status: SHIPPED | OUTPATIENT
Start: 2024-10-04

## 2024-10-04 NOTE — ASSESSMENT & PLAN NOTE
Hx of nasal congestion - xray ordered to look for opacification.   Orders:    XR facial bones 3+ vw; Future

## 2024-10-04 NOTE — PROGRESS NOTES
"Ambulatory Visit  Name: Jennifer Carrillo      : 1955      MRN: [id number]  Encounter Provider: MARK Chatman  Encounter Date: 10/4/2024   Encounter department: [facility]    Assessment & Plan  HTN (hypertension)  BP has been in range for her log book - she did have one that was a systolic of 100, but on average it has been 110's to 130's.   Orders:    amLODIPine (NORVASC) 10 mg tablet; Take 1 tablet (10 mg total) by mouth daily    BMI 32.0-32.9,adult  Continues with wegovy, tolerating well.  Orders:    Semaglutide-Weight Management (Wegovy) 2.4 MG/0.75ML; Inject 0.75 mL (2.4 mg total) under the skin once a week    Postmenopausal  See below  Orders:    estradiol (Estrace) 0.5 MG tablet; Take 1 tablet (0.5 mg total) by mouth daily    Nasal congestion  Hx of nasal congestion - xray ordered to look for opacification.   Orders:    XR facial bones 3+ vw; Future    Hot flashes  Had total hysterectomy plus ovaries in her 20's.  Was on medication for some time to suppress menopause.  Will have her start estradiol.    Orders:    estradiol (Estrace) 0.5 MG tablet; Take 1 tablet (0.5 mg total) by mouth daily    She declined her screening mammogram this year - we reviewed her chart and she had the left breast mammogram and US done in March of this year.  Her screening mammogram was due in July - she would like to defer that until .     History of Present Illness     Here for a BP recheck she is currently taking amlodipine 10 mg daily and tolerating it.    Reports increased hot flashes and would like to go on estrogen.  No hx of breast cancer. Had hysterectomy in her 20's including both ovaries.            Review of Systems   Constitutional: Negative.    Respiratory: Negative.     Cardiovascular: Negative.            Objective     /68 (BP Location: Right arm, Patient Position: Sitting, Cuff Size: Large)   Pulse 81   Ht 5' 2\" (1.575 m)   Wt 68.7 kg (151 lb 6.4 oz)   SpO2 98%  "  BMI 27.69 kg/m²     Physical Exam

## 2024-10-04 NOTE — TELEPHONE ENCOUNTER
Called and left voice message for patient about scheduling OVS for shoulder injection with Dr. Del Rio.     [phone] for callback to schedule

## 2024-10-25 ENCOUNTER — OFFICE VISIT (OUTPATIENT)
Dept: PAIN MEDICINE | Facility: CLINIC | Age: 69
End: 2024-10-25
Payer: COMMERCIAL

## 2024-10-25 VITALS
HEIGHT: 62 IN | TEMPERATURE: 98.2 F | HEART RATE: 65 BPM | OXYGEN SATURATION: 98 % | BODY MASS INDEX: 28.52 KG/M2 | DIASTOLIC BLOOD PRESSURE: 72 MMHG | SYSTOLIC BLOOD PRESSURE: 116 MMHG | RESPIRATION RATE: 18 BRPM | WEIGHT: 155 LBS

## 2024-10-25 DIAGNOSIS — M19.012 PRIMARY OSTEOARTHRITIS OF LEFT SHOULDER: ICD-10-CM

## 2024-10-25 DIAGNOSIS — M54.14 THORACIC RADICULITIS: Primary | ICD-10-CM

## 2024-10-25 PROCEDURE — 99214 OFFICE O/P EST MOD 30 MIN: CPT | Performed by: ANESTHESIOLOGY

## 2024-10-25 PROCEDURE — 20611 DRAIN/INJ JOINT/BURSA W/US: CPT | Performed by: ANESTHESIOLOGY

## 2024-10-25 RX ORDER — METHYLPREDNISOLONE ACETATE 40 MG/ML
40 INJECTION, SUSPENSION INTRA-ARTICULAR; INTRALESIONAL; INTRAMUSCULAR; SOFT TISSUE ONCE
Status: COMPLETED | OUTPATIENT
Start: 2024-10-25 | End: 2024-10-25

## 2024-10-25 RX ORDER — BUPIVACAINE HYDROCHLORIDE 2.5 MG/ML
4 INJECTION, SOLUTION EPIDURAL; INFILTRATION; INTRACAUDAL ONCE
Status: COMPLETED | OUTPATIENT
Start: 2024-10-25 | End: 2024-10-25

## 2024-10-25 RX ADMIN — BUPIVACAINE HYDROCHLORIDE 4 ML: 2.5 INJECTION, SOLUTION EPIDURAL; INFILTRATION; INTRACAUDAL at 12:38

## 2024-10-25 RX ADMIN — METHYLPREDNISOLONE ACETATE 40 MG: 40 INJECTION, SUSPENSION INTRA-ARTICULAR; INTRALESIONAL; INTRAMUSCULAR; SOFT TISSUE at 12:38

## 2024-10-25 NOTE — PATIENT INSTRUCTIONS
"Patient Education     Steroid injection   The Basics   Written by the doctors and editors at [facility]   What is a steroid injection? -- Steroids, also known as \"glucocorticoids,\" are medicines that help reduce swelling and pain. Doctors sometimes inject a steroid medicine into a joint or other part of the body to relieve pain. This is also sometimes called a \"cortisone shot.\"  After the injection, the steroid starts to work within a few days.  How long does a steroid injection work? -- It depends on the person and where the injection is given. For some people, the effects of a steroid injection can last for a few weeks or longer.  Sometimes, the doctor also injects a medicine called a \"local anesthetic\" with the steroid. This might help relieve pain until the steroid starts to work.  A steroid injection can help with pain, but it won't cure the problem that is causing the pain.  How do I prepare for a steroid injection? -- The doctor or nurse will tell you if you need to do anything special to prepare. Before your procedure, your doctor will do an exam.  Your doctor will also ask you about your \"health history.\" This involves asking you questions about any health problems you have or had in the past, past surgeries, and any medicines you take. Tell them about:   Any medicines you are taking - This includes any prescription or \"over-the-counter\" medicines you use, plus any herbal supplements you take. It helps to write down and bring a list of any medicines you take, or bring a bag with all of your medicines with you.   Any allergies you have   Any bleeding problems you have   Any other steroid injections you have had  Ask the doctor or nurse if you have questions or if there is anything you do not understand.  How is a steroid injection given? -- When it is time for the injection:   The doctor will clean the skin over the area where they plan to give the shot. (This is called the \"injection site.\")   They might use " ultrasound or a special kind of X-ray during the procedure. This is to check where to give the steroid.   Sometimes, they might give a shot of medicine to numb the skin.   They will inject the steroid.   Then, they will take out the needle and cover the injection site with a bandage.  What happens after a steroid injection? -- You can go home after the injection.  For the next few days, you might want to:   Apply a cold gel pack, bag of ice, or bag of frozen vegetables to the injection site every 1 to 2 hours, for 15 minutes each time. Put a thin towel between the ice (or other cold object) and your skin.   Rest the treated body part for a few days.   Take medicines to relieve pain. Examples include acetaminophen (sample brand name: Tylenol), ibuprofen (sample brand names: Advil, Motrin), or naproxen (sample brand name: Aleve).  If you have diabetes, the doctor might want you to check your blood sugar levels more often for a few days. The steroid medicine might temporarily raise your blood sugar.  What are the risks of a steroid injection? -- Your doctor will talk to you about all of the possible risks, and answer your questions. Possible risks include:   Bleeding, bruising, or soreness at the injection site   Damage to parts near the injection site - This might include cartilage damage, injury to nerves, tendon rupture, or thinning of skin and bones.   Skin around the injection site becoming lighter or whiter in color   Infection   Health conditions like diabetes or high blood pressure getting worse for a few days   Allergic reaction to the medicine  What else should I know? -- Most of the time, doctors limit the total number of steroid injections to a certain area.  A steroid injection might be only 1 part of your treatment plan. Take your other medicines as instructed. Also, follow your doctor's recommendations about other treatments. These might include things like physical therapy or devices like a brace or  cane.  All topics are updated as new evidence becomes available and our peer review process is complete.  This topic retrieved from [employer] on: Apr 25, 2024.  Topic 019354 Version 2.0  Release: 32.4.2 - C32.114  © 2024 [employer] and/or its affiliates. All rights reserved.  Consumer Information Use and Disclaimer   Disclaimer: This generalized information is a limited summary of diagnosis, treatment, and/or medication information. It is not meant to be comprehensive and should be used as a tool to help the user understand and/or assess potential diagnostic and treatment options. It does NOT include all information about conditions, treatments, medications, side effects, or risks that may apply to a specific patient. It is not intended to be medical advice or a substitute for the medical advice, diagnosis, or treatment of a health care provider based on the health care provider's examination and assessment of a patient's specific and unique circumstances. Patients must speak with a health care provider for complete information about their health, medical questions, and treatment options, including any risks or benefits regarding use of medications. This information does not endorse any treatments or medications as safe, effective, or approved for treating a specific patient. [employer] and its affiliates disclaim any warranty or liability relating to this information or the use thereof.The use of this information is governed by the Terms of Use, available at https://www.woltersZookaluwer.com/en/know/clinical-effectiveness-terms. 2024© [employer] and its affiliates and/or licensors. All rights reserved.  Copyright   © 2024 [employer] and/or its affiliates. All rights reserved.

## 2024-10-25 NOTE — H&P (VIEW-ONLY)
Assessment  1. Thoracic radiculitis  2. Primary osteoarthritis of left shoulder  -     bupivacaine (PF) (MARCAINE) 0.25 % injection 4 mL  -     methylPREDNISolone acetate (DEPO-MEDROL) injection 40 mg    Greater than 90% relief of pain with improved ability to participate with IADLs after T11-T12 ILESI for more than 6 months; this pain has subsequently returned. Mri reviewed showing Small right subarticular disc protrusion at levels T11-12 and T12-L1 mildly indenting right ventral thecal sac without significant canal stenosis which was contributory. Additionally she describes left shoulder pain with overhead maneuvers, pain with lying on that side. Ttp over left sits muscles, normal ROM, no significant pain at present with abduction/external rotation; xray noncontributory. Risks discussed with regard to repeat joint injection which provided greater than 90% relief of pain with improved ability to participate with IADLs previously. Continues to report the following symptomatology:     Axial mid back pain described primarily by arthritic features with radicular pain in T7-T11 dermatome.  Aching, nagging, indolent, stabbing, throbbing features in axial mid back with radicular components.  5/5 strength bilaterally in upper and lower extremities, negative SLR.  Positive facet loading maneuvers in thoracic spine elicited pain, positive tenderness to palpation over thoracic paraspinal muscles more right sided.  xray thoracic spine noncontributory;however, considerable spondylosis noted in lumbar spine.  Currently she is neurologically intact without gait instability, saddle anesthesia or bowel/bladder abnormality. Risks, benefits and alternatives to KRYS, medial branch blocks and subsequent radiofrequency ablation if successful thoroughly discussed with patient.  Handouts provided questions answered to patient satisfaction.    The patient has been experiencing moderate to severe axial spine pain that is causing  functional deficit.  The pain has been present for at least 3 months and is not improving with conservative care.  Currently the patient is not experiencing any radicular features nor neurogenic claudication.  Non-facet pathology has been ruled out on clinical evaluation.      Plan  -left shoulder glenohumeral joint injection; f/u prn  -voltaren gel rx to be applied to left shoulder 4x daily prn pain  -tizanidine 4 mg t.i.d. prn muscle spasm ordered for patient; counseled regarding sedative effects of taking this medication and provided up titration calendar.  Counseled not to take medication while driving or operating heavy machinery/using stairs  -has completed formal physical therapy for right-sided mid back pain/thoracic spondylosis and muscle spasm; Physician directed home exercise plan as per [facility] demonstrated and handouts provided that patient plans to participate with for 1 hour, twice a week for the next 6 weeks.     Pre-procedure Diagnosis:   1. Shoulder Pain [LEFT]     Post-procedure Diagnosis:   1. Shoulder Pain [LEFT]        Operation Title(s):  1. [LEFT] shoulder glenohumeral joint steroid injection      2. Ultrasound  Attending Surgeon:   Cabrera Del Rio MD  Anesthesia:   Local    1. Shoulder Pain [LEFT]    The patient's history and physical exam were reviewed.   The risks, benefits and alternatives to the procedure were discussed, and all questions were answered to the patient's satisfaction. The patient agreed to proceed, and written informed consent was obtained.    Procedure in Detail: The patient was placed in the sitting position on the exam chair. The [LEFT] posterior shoulder was prepped with chloraprep times two and draped in a sterile manner.     Ultrasound was used to identify and yenni the [LEFT] glenohumeral joint. A 22-gauge, 3½-inch spinal needle was advanced toward the glenohumeral bursa under ultrrasound guidance until the area outside of the joint space was entered.  Then, after  negative aspiration, a solution consisting of 4mL 0.25% bupivacaine and 1mL Depo-Medrol (40mg/ml) was easily injected. The needle was removed.    The patient's shoulder was cleaned and a bandage was placed over the site of needle insertion.    Disposition: The patient tolerated the procedure well and there were no apparent complications.  The patient was taken to the recovery area where written discharge instructions for the procedure were given.    Estimated Blood Loss: None  Specimens Obtained: N/A      There are risks associated with opioid medications, including dependence, addiction and tolerance. The patient understands and agrees to use these medications only as prescribed. Potential side effects of the medications include, but are not limited to, constipation, drowsiness, addiction, impaired judgment and risk of fatal overdose if not taken as prescribed. The patient was warned against driving while taking sedation medications or operating heavy machinery. The patient voiced understanding. Sharing medications is a felony. At this point in time, the patient is showing no signs of addiction, abuse, diversion or suicidal ideation.     [place] Prescription Drug Monitoring Program report was reviewed and was appropriate      Complete risks and benefits including bleeding, infection, tissue reaction, nerve injury and allergic reaction were discussed. The approach was demonstrated using models and literature was provided. Verbal and written consent was obtained.     My impressions and treatment recommendations were discussed in detail with the patient who verbalized understanding and had no further questions.  Discharge instructions were provided. I personally saw and examined the patient and I agree with the above discussed plan of care.      New Medications Ordered This Visit   Medications    bupivacaine (PF) (MARCAINE) 0.25 % injection 4 mL    methylPREDNISolone acetate (DEPO-MEDROL) injection 40 mg        History of Present Illness    Greater than 90% relief of pain with improved ability to participate with IADLs after T11-T12 ILESI. Now describes left shoulder pain with overhead maneuvers, pain with lying on that side. Ttp over left sits muscles, normal ROM, no significant pain at present with abduction/external rotation; xray noncontributory. Risks discussed with regard to joint  injection. Previously reported the following symptomatology:     Jennifer Carrillo is a 69 y.o. female with pmhx of prediabetes presenting with chronic mid back pain described primarily as arthritic in nature. She describes 8/10 ,od back pain that is worse in the mornings and worse at the end of the day.  The pain is characterized by achy, nagging, indolent, crampy, stabbing pain in her axial mid back.  The patient describes that the pain is worse with standing for long periods of time on hard surfaces as well as with walking.  The patient is a very active individual and feels as though this pain compromises his participation with independent activities of daily living. The pain can be debilitating at times and contribute to significant disability, compromising overall activity and independent activities of daily living.  She has not yet trialed PT formally.  Medications the patient has tried in the past include nsaids, tylenol. She describes b/l T7-T11 radicular symptoms and has good strength.  She denies any weakness numbness or paresthesias. The patient denies any bowel or bladder dysfunction, saddle anesthesia or gait instability as well.      I have personally reviewed and/or updated the patient's past medical history, past surgical history, family history, social history, current medications, allergies, and vital signs today.     Review of Systems   Constitutional:  Positive for activity change.   HENT: Negative.     Eyes: Negative.    Respiratory: Negative.     Cardiovascular: Negative.    Gastrointestinal: Negative.    Endocrine:  Negative.    Genitourinary: Negative.    Musculoskeletal:  Positive for arthralgias, back pain and myalgias. Negative for gait problem.   Skin: Negative.    Allergic/Immunologic: Negative.    Neurological:  Negative for weakness and numbness.   Hematological: Negative.    Psychiatric/Behavioral: Negative.     All other systems reviewed and are negative.      Patient Active Problem List   Diagnosis    Sphincter of Oddi dysfunction    Arthritis    Elevated hemoglobin A1c    Hepatic steatosis    Nasal congestion    Chest congestion    Tracheitis    Thoracic radiculitis    Primary osteoarthritis of left shoulder       Past Medical History:   Diagnosis Date    Allergic Several years ago    Arthritis Several years ago    Bronchitis     Elevated hemoglobin A1c     Pt states she does not take medication but is trying to lower with diet and exercise ( 7.1)    Kidney stone Several years ago    Lipoma of arm     LUE    Obesity Several years ago    Sphincter of Oddi dysfunction        Past Surgical History:   Procedure Laterality Date    BLADDER SUSPENSION      BREAST EXCISIONAL BIOPSY Left 2000    benign    CHOLECYSTECTOMY      COLONOSCOPY      HYSTERECTOMY  1980    IR SPINE AND PAIN PROCEDURE  4/16/2024    OOPHORECTOMY Bilateral 1980    MT EXC B9 LESION MRGN XCP SK TG T/A/L 3.1-4.0 CM Left 06/03/2022    Procedure: UPPER ARM MASS EXCISION;  Surgeon: Krys Mendiola DO;  Location:  MAIN OR;  Service: General    RECTAL PROLAPSE REPAIR         Family History   Problem Relation Age of Onset    No Known Problems Mother     Diabetes Father     No Known Problems Sister     No Known Problems Daughter     Leukemia Maternal Grandmother     No Known Problems Maternal Grandfather     No Known Problems Paternal Grandmother     No Known Problems Paternal Grandfather     No Known Problems Maternal Aunt     No Known Problems Maternal Aunt     No Known Problems Paternal Aunt     No Known Problems Paternal Aunt     No Known Problems Sister   "   No Known Problems Daughter     BRCA2 Positive Neg Hx     BRCA2 Negative Neg Hx     BRCA1 Positive Neg Hx     BRCA1 Negative Neg Hx     BRCA 1/2 Neg Hx     Ovarian cancer Neg Hx     Endometrial cancer Neg Hx     Colon cancer Neg Hx     Breast cancer additional onset Neg Hx     Breast cancer Neg Hx        Social History     Occupational History    Not on file   Tobacco Use    Smoking status: Former     Current packs/day: 0.00     Average packs/day: 0.5 packs/day for 21.4 years (10.7 ttl pk-yrs)     Types: Cigarettes     Start date: 1972     Quit date: 1993     Years since quittin.4     Passive exposure: Never    Smokeless tobacco: Never   Vaping Use    Vaping status: Never Used   Substance and Sexual Activity    Alcohol use: Never    Drug use: Never    Sexual activity: Not Currently     Partners: Male     Birth control/protection: None       Current Outpatient Medications on File Prior to Visit   Medication Sig    amLODIPine (NORVASC) 10 mg tablet Take 1 tablet (10 mg total) by mouth daily    estradiol (Estrace) 0.5 MG tablet Take 1 tablet (0.5 mg total) by mouth daily    Semaglutide-Weight Management (Wegovy) 2.4 MG/0.75ML Inject 0.75 mL (2.4 mg total) under the skin once a week    Sennosides (Laxative Pills) 25 MG TABS Take 2 tablets by mouth daily at bedtime (Patient not taking: Reported on 10/4/2024)     No current facility-administered medications on file prior to visit.       Allergies   Allergen Reactions    Cayenne - Food Allergy Shortness Of Breath     Pt states she coughs and then can't take breaths in    Pineapple - Food Allergy Shortness Of Breath     Pt states she has a terrible cough and can't breath in    Demerol [Meperidine] Itching    Oxycodone Itching    Propoxyphene Itching    Tegaderm Alginate Ag Rope Rash and Swelling         Physical Exam    /72 (BP Location: Left arm, Patient Position: Sitting, Cuff Size: Adult)   Pulse 65   Temp 98.2 °F (36.8 °C)   Resp 18   Ht 5' 2\" " (1.575 m)   Wt 70.3 kg (155 lb)   SpO2 98%   BMI 28.35 kg/m²     Constitutional: normal, well developed, well nourished, alert, in no distress and non-toxic and no overt pain behavior. and overweight  Eyes: anicteric  HEENT: grossly intact  Neck: supple, symmetric, trachea midline and no masses   Pulmonary:even and unlabored  Cardiovascular:No edema or pitting edema present  Skin:Normal without rashes or lesions and well hydrated  Psychiatric:Mood and affect appropriate  Neurologic:Cranial Nerves II-XII grossly intact Sensation grossly intact; no clonus negative romero's. Reflexes 2+ and brisk. SLR negative bilaterally. Spurling's maneuver negative bilaterally.  Musculoskeletal:normal gait. 5/5 strength bilaterally with AROM in all extremities. Normal heel toe and tip toe walking. Significant pain with thoracic facet loading bilaterally and with lateral spine rotation, right greater than left. ttp over right sided thoracic paraspinal muscles. Negative bhavin's test, negative gaenslen's negative SIJ loading bilaterally.    Imaging    MRI THORACIC SPINE WITHOUT CONTRAST     INDICATION: M47.814: Spondylosis without myelopathy or radiculopathy, thoracic region.     COMPARISON: X-ray thoracic spine 5/9/2022     TECHNIQUE:  Multiplanar, multisequence imaging of the thoracic spine was performed. .     IMAGE QUALITY: Diagnostic.     FINDINGS:     ALIGNMENT: Alignment is unremarkable.     MARROW SIGNAL: No bone marrow signal abnormality or suspicious discrete lesion.     THORACIC CORD: Normal signal within the thoracic cord.     PARAVERTEBRAL SOFT TISSUES:  Normal.     THORACIC DEGENERATIVE CHANGE:     Small right subarticular disc protrusion at levels T11-12 and T12-L1 mildly indenting right ventral thecal sac without significant canal stenosis.  Minimal disc bulge at the levels T7 through T11 with tiny disc protrusions (left subarticular T7-8 and right subarticular T8-9). There is minor canal narrowing at these  levels.     Incompletely evaluated (partially imaged) degenerative narrowing in the mid to low cervical spine.     OTHER FINDINGS: Right renal cysts.     IMPRESSION:     Mild degenerative change in the mid to lower thoracic spine as described. No high-grade canal or foraminal stenosis.

## 2024-10-25 NOTE — PROGRESS NOTES
Assessment  1. Thoracic radiculitis  2. Primary osteoarthritis of left shoulder  -     bupivacaine (PF) (MARCAINE) 0.25 % injection 4 mL  -     methylPREDNISolone acetate (DEPO-MEDROL) injection 40 mg    Greater than 90% relief of pain with improved ability to participate with IADLs after T11-T12 ILESI for more than 6 months; this pain has subsequently returned. Mri reviewed showing Small right subarticular disc protrusion at levels T11-12 and T12-L1 mildly indenting right ventral thecal sac without significant canal stenosis which was contributory. Additionally she describes left shoulder pain with overhead maneuvers, pain with lying on that side. Ttp over left sits muscles, normal ROM, no significant pain at present with abduction/external rotation; xray noncontributory. Risks discussed with regard to repeat joint injection which provided greater than 90% relief of pain with improved ability to participate with IADLs previously. Continues to report the following symptomatology:     Axial mid back pain described primarily by arthritic features with radicular pain in T7-T11 dermatome.  Aching, nagging, indolent, stabbing, throbbing features in axial mid back with radicular components.  5/5 strength bilaterally in upper and lower extremities, negative SLR.  Positive facet loading maneuvers in thoracic spine elicited pain, positive tenderness to palpation over thoracic paraspinal muscles more right sided.  xray thoracic spine noncontributory;however, considerable spondylosis noted in lumbar spine.  Currently she is neurologically intact without gait instability, saddle anesthesia or bowel/bladder abnormality. Risks, benefits and alternatives to KRYS, medial branch blocks and subsequent radiofrequency ablation if successful thoroughly discussed with patient.  Handouts provided questions answered to patient satisfaction.    The patient has been experiencing moderate to severe axial spine pain that is causing  functional deficit.  The pain has been present for at least 3 months and is not improving with conservative care.  Currently the patient is not experiencing any radicular features nor neurogenic claudication.  Non-facet pathology has been ruled out on clinical evaluation.      Plan  -left shoulder glenohumeral joint injection; f/u prn  -voltaren gel rx to be applied to left shoulder 4x daily prn pain  -tizanidine 4 mg t.i.d. prn muscle spasm ordered for patient; counseled regarding sedative effects of taking this medication and provided up titration calendar.  Counseled not to take medication while driving or operating heavy machinery/using stairs  -has completed formal physical therapy for right-sided mid back pain/thoracic spondylosis and muscle spasm; Physician directed home exercise plan as per [facility] demonstrated and handouts provided that patient plans to participate with for 1 hour, twice a week for the next 6 weeks.     Pre-procedure Diagnosis:   1. Shoulder Pain [LEFT]     Post-procedure Diagnosis:   1. Shoulder Pain [LEFT]        Operation Title(s):  1. [LEFT] shoulder glenohumeral joint steroid injection      2. Ultrasound  Attending Surgeon:   Cabrera Del Rio MD  Anesthesia:   Local    1. Shoulder Pain [LEFT]    The patient's history and physical exam were reviewed.   The risks, benefits and alternatives to the procedure were discussed, and all questions were answered to the patient's satisfaction. The patient agreed to proceed, and written informed consent was obtained.    Procedure in Detail: The patient was placed in the sitting position on the exam chair. The [LEFT] posterior shoulder was prepped with chloraprep times two and draped in a sterile manner.     Ultrasound was used to identify and yenni the [LEFT] glenohumeral joint. A 22-gauge, 3½-inch spinal needle was advanced toward the glenohumeral bursa under ultrrasound guidance until the area outside of the joint space was entered.  Then, after  negative aspiration, a solution consisting of 4mL 0.25% bupivacaine and 1mL Depo-Medrol (40mg/ml) was easily injected. The needle was removed.    The patient's shoulder was cleaned and a bandage was placed over the site of needle insertion.    Disposition: The patient tolerated the procedure well and there were no apparent complications.  The patient was taken to the recovery area where written discharge instructions for the procedure were given.    Estimated Blood Loss: None  Specimens Obtained: N/A      There are risks associated with opioid medications, including dependence, addiction and tolerance. The patient understands and agrees to use these medications only as prescribed. Potential side effects of the medications include, but are not limited to, constipation, drowsiness, addiction, impaired judgment and risk of fatal overdose if not taken as prescribed. The patient was warned against driving while taking sedation medications or operating heavy machinery. The patient voiced understanding. Sharing medications is a felony. At this point in time, the patient is showing no signs of addiction, abuse, diversion or suicidal ideation.     [place] Prescription Drug Monitoring Program report was reviewed and was appropriate      Complete risks and benefits including bleeding, infection, tissue reaction, nerve injury and allergic reaction were discussed. The approach was demonstrated using models and literature was provided. Verbal and written consent was obtained.     My impressions and treatment recommendations were discussed in detail with the patient who verbalized understanding and had no further questions.  Discharge instructions were provided. I personally saw and examined the patient and I agree with the above discussed plan of care.      New Medications Ordered This Visit   Medications    bupivacaine (PF) (MARCAINE) 0.25 % injection 4 mL    methylPREDNISolone acetate (DEPO-MEDROL) injection 40 mg        History of Present Illness    Greater than 90% relief of pain with improved ability to participate with IADLs after T11-T12 ILESI. Now describes left shoulder pain with overhead maneuvers, pain with lying on that side. Ttp over left sits muscles, normal ROM, no significant pain at present with abduction/external rotation; xray noncontributory. Risks discussed with regard to joint  injection. Previously reported the following symptomatology:     Jennifer Carrillo is a 69 y.o. female with pmhx of prediabetes presenting with chronic mid back pain described primarily as arthritic in nature. She describes 8/10 ,od back pain that is worse in the mornings and worse at the end of the day.  The pain is characterized by achy, nagging, indolent, crampy, stabbing pain in her axial mid back.  The patient describes that the pain is worse with standing for long periods of time on hard surfaces as well as with walking.  The patient is a very active individual and feels as though this pain compromises his participation with independent activities of daily living. The pain can be debilitating at times and contribute to significant disability, compromising overall activity and independent activities of daily living.  She has not yet trialed PT formally.  Medications the patient has tried in the past include nsaids, tylenol. She describes b/l T7-T11 radicular symptoms and has good strength.  She denies any weakness numbness or paresthesias. The patient denies any bowel or bladder dysfunction, saddle anesthesia or gait instability as well.      I have personally reviewed and/or updated the patient's past medical history, past surgical history, family history, social history, current medications, allergies, and vital signs today.     Review of Systems   Constitutional:  Positive for activity change.   HENT: Negative.     Eyes: Negative.    Respiratory: Negative.     Cardiovascular: Negative.    Gastrointestinal: Negative.    Endocrine:  Negative.    Genitourinary: Negative.    Musculoskeletal:  Positive for arthralgias, back pain and myalgias. Negative for gait problem.   Skin: Negative.    Allergic/Immunologic: Negative.    Neurological:  Negative for weakness and numbness.   Hematological: Negative.    Psychiatric/Behavioral: Negative.     All other systems reviewed and are negative.      Patient Active Problem List   Diagnosis    Sphincter of Oddi dysfunction    Arthritis    Elevated hemoglobin A1c    Hepatic steatosis    Nasal congestion    Chest congestion    Tracheitis    Thoracic radiculitis    Primary osteoarthritis of left shoulder       Past Medical History:   Diagnosis Date    Allergic Several years ago    Arthritis Several years ago    Bronchitis     Elevated hemoglobin A1c     Pt states she does not take medication but is trying to lower with diet and exercise ( 7.1)    Kidney stone Several years ago    Lipoma of arm     LUE    Obesity Several years ago    Sphincter of Oddi dysfunction        Past Surgical History:   Procedure Laterality Date    BLADDER SUSPENSION      BREAST EXCISIONAL BIOPSY Left 2000    benign    CHOLECYSTECTOMY      COLONOSCOPY      HYSTERECTOMY  1980    IR SPINE AND PAIN PROCEDURE  4/16/2024    OOPHORECTOMY Bilateral 1980    CO EXC B9 LESION MRGN XCP SK TG T/A/L 3.1-4.0 CM Left 06/03/2022    Procedure: UPPER ARM MASS EXCISION;  Surgeon: Krys Mendiola DO;  Location:  MAIN OR;  Service: General    RECTAL PROLAPSE REPAIR         Family History   Problem Relation Age of Onset    No Known Problems Mother     Diabetes Father     No Known Problems Sister     No Known Problems Daughter     Leukemia Maternal Grandmother     No Known Problems Maternal Grandfather     No Known Problems Paternal Grandmother     No Known Problems Paternal Grandfather     No Known Problems Maternal Aunt     No Known Problems Maternal Aunt     No Known Problems Paternal Aunt     No Known Problems Paternal Aunt     No Known Problems Sister   "   No Known Problems Daughter     BRCA2 Positive Neg Hx     BRCA2 Negative Neg Hx     BRCA1 Positive Neg Hx     BRCA1 Negative Neg Hx     BRCA 1/2 Neg Hx     Ovarian cancer Neg Hx     Endometrial cancer Neg Hx     Colon cancer Neg Hx     Breast cancer additional onset Neg Hx     Breast cancer Neg Hx        Social History     Occupational History    Not on file   Tobacco Use    Smoking status: Former     Current packs/day: 0.00     Average packs/day: 0.5 packs/day for 21.4 years (10.7 ttl pk-yrs)     Types: Cigarettes     Start date: 1972     Quit date: 1993     Years since quittin.4     Passive exposure: Never    Smokeless tobacco: Never   Vaping Use    Vaping status: Never Used   Substance and Sexual Activity    Alcohol use: Never    Drug use: Never    Sexual activity: Not Currently     Partners: Male     Birth control/protection: None       Current Outpatient Medications on File Prior to Visit   Medication Sig    amLODIPine (NORVASC) 10 mg tablet Take 1 tablet (10 mg total) by mouth daily    estradiol (Estrace) 0.5 MG tablet Take 1 tablet (0.5 mg total) by mouth daily    Semaglutide-Weight Management (Wegovy) 2.4 MG/0.75ML Inject 0.75 mL (2.4 mg total) under the skin once a week    Sennosides (Laxative Pills) 25 MG TABS Take 2 tablets by mouth daily at bedtime (Patient not taking: Reported on 10/4/2024)     No current facility-administered medications on file prior to visit.       Allergies   Allergen Reactions    Cayenne - Food Allergy Shortness Of Breath     Pt states she coughs and then can't take breaths in    Pineapple - Food Allergy Shortness Of Breath     Pt states she has a terrible cough and can't breath in    Demerol [Meperidine] Itching    Oxycodone Itching    Propoxyphene Itching    Tegaderm Alginate Ag Rope Rash and Swelling         Physical Exam    /72 (BP Location: Left arm, Patient Position: Sitting, Cuff Size: Adult)   Pulse 65   Temp 98.2 °F (36.8 °C)   Resp 18   Ht 5' 2\" " (1.575 m)   Wt 70.3 kg (155 lb)   SpO2 98%   BMI 28.35 kg/m²     Constitutional: normal, well developed, well nourished, alert, in no distress and non-toxic and no overt pain behavior. and overweight  Eyes: anicteric  HEENT: grossly intact  Neck: supple, symmetric, trachea midline and no masses   Pulmonary:even and unlabored  Cardiovascular:No edema or pitting edema present  Skin:Normal without rashes or lesions and well hydrated  Psychiatric:Mood and affect appropriate  Neurologic:Cranial Nerves II-XII grossly intact Sensation grossly intact; no clonus negative romero's. Reflexes 2+ and brisk. SLR negative bilaterally. Spurling's maneuver negative bilaterally.  Musculoskeletal:normal gait. 5/5 strength bilaterally with AROM in all extremities. Normal heel toe and tip toe walking. Significant pain with thoracic facet loading bilaterally and with lateral spine rotation, right greater than left. ttp over right sided thoracic paraspinal muscles. Negative bhavin's test, negative gaenslen's negative SIJ loading bilaterally.    Imaging    MRI THORACIC SPINE WITHOUT CONTRAST     INDICATION: M47.814: Spondylosis without myelopathy or radiculopathy, thoracic region.     COMPARISON: X-ray thoracic spine 5/9/2022     TECHNIQUE:  Multiplanar, multisequence imaging of the thoracic spine was performed. .     IMAGE QUALITY: Diagnostic.     FINDINGS:     ALIGNMENT: Alignment is unremarkable.     MARROW SIGNAL: No bone marrow signal abnormality or suspicious discrete lesion.     THORACIC CORD: Normal signal within the thoracic cord.     PARAVERTEBRAL SOFT TISSUES:  Normal.     THORACIC DEGENERATIVE CHANGE:     Small right subarticular disc protrusion at levels T11-12 and T12-L1 mildly indenting right ventral thecal sac without significant canal stenosis.  Minimal disc bulge at the levels T7 through T11 with tiny disc protrusions (left subarticular T7-8 and right subarticular T8-9). There is minor canal narrowing at these  levels.     Incompletely evaluated (partially imaged) degenerative narrowing in the mid to low cervical spine.     OTHER FINDINGS: Right renal cysts.     IMPRESSION:     Mild degenerative change in the mid to lower thoracic spine as described. No high-grade canal or foraminal stenosis.

## 2024-10-28 ENCOUNTER — PREP FOR PROCEDURE (OUTPATIENT)
Dept: PAIN MEDICINE | Facility: CLINIC | Age: 69
End: 2024-10-28

## 2024-10-28 DIAGNOSIS — M54.14 THORACIC RADICULOPATHY: Primary | ICD-10-CM

## 2024-11-05 ENCOUNTER — HOSPITAL ENCOUNTER (OUTPATIENT)
Dept: INTERVENTIONAL RADIOLOGY/VASCULAR | Facility: HOSPITAL | Age: 69
Discharge: HOME | End: 2024-11-05
Attending: ANESTHESIOLOGY
Payer: COMMERCIAL

## 2024-11-05 VITALS
HEART RATE: 79 BPM | BODY MASS INDEX: 28.52 KG/M2 | WEIGHT: 155 LBS | RESPIRATION RATE: 18 BRPM | SYSTOLIC BLOOD PRESSURE: 138 MMHG | HEIGHT: 62 IN | OXYGEN SATURATION: 98 % | TEMPERATURE: 97.9 F | DIASTOLIC BLOOD PRESSURE: 74 MMHG

## 2024-11-05 DIAGNOSIS — M54.14 THORACIC RADICULOPATHY: ICD-10-CM

## 2024-11-05 LAB — GLUCOSE SERPL-MCNC: 98 MG/DL (ref 65–140)

## 2024-11-05 PROCEDURE — 62321 NJX INTERLAMINAR CRV/THRC: CPT | Performed by: ANESTHESIOLOGY

## 2024-11-05 PROCEDURE — 82948 REAGENT STRIP/BLOOD GLUCOSE: CPT

## 2024-11-05 RX ORDER — LIDOCAINE HYDROCHLORIDE 10 MG/ML
INJECTION, SOLUTION EPIDURAL; INFILTRATION; INTRACAUDAL; PERINEURAL AS NEEDED
Status: COMPLETED | OUTPATIENT
Start: 2024-11-05 | End: 2024-11-05

## 2024-11-05 RX ORDER — METHYLPREDNISOLONE ACETATE 80 MG/ML
INJECTION, SUSPENSION INTRA-ARTICULAR; INTRALESIONAL; INTRAMUSCULAR; PARENTERAL; SOFT TISSUE AS NEEDED
Status: COMPLETED | OUTPATIENT
Start: 2024-11-05 | End: 2024-11-05

## 2024-11-05 RX ORDER — 0.9 % SODIUM CHLORIDE 0.9 %
VIAL (ML) INJECTION AS NEEDED
Status: COMPLETED | OUTPATIENT
Start: 2024-11-05 | End: 2024-11-05

## 2024-11-05 RX ADMIN — IOHEXOL 1 ML: 240 INJECTION, SOLUTION INTRATHECAL; INTRAVASCULAR; INTRAVENOUS; ORAL at 08:52

## 2024-11-05 RX ADMIN — SODIUM CHLORIDE 3 ML: 9 INJECTION, SOLUTION INTRAMUSCULAR; INTRAVENOUS; SUBCUTANEOUS at 08:52

## 2024-11-05 RX ADMIN — METHYLPREDNISOLONE ACETATE 80 MG: 80 INJECTION, SUSPENSION INTRA-ARTICULAR; INTRALESIONAL; INTRAMUSCULAR; SOFT TISSUE at 08:52

## 2024-11-05 RX ADMIN — LIDOCAINE HYDROCHLORIDE 5 ML: 10 INJECTION, SOLUTION EPIDURAL; INFILTRATION; INTRACAUDAL; PERINEURAL at 08:52

## 2024-11-05 NOTE — INTERVAL H&P NOTE
H&P reviewed. After examining the patient I find no changes in the patients condition since the H&P had been written.    Vitals:    11/05/24 0823   BP: 114/75   Pulse: 74   Resp: 18   Temp: 97.9 °F (36.6 °C)   SpO2: 97%

## 2024-11-05 NOTE — DISCHARGE INSTR - AVS FIRST PAGE
YOUR 2 WEEK FOLLOW UP HAS BEEN SCHEDULED; IF YOU WISH TO CHANGE THE FOLLOW UP, PLEASE CALL THE [facility] AT [place]: [phone]    EPIDURAL STEROID INJECTION DISCHARGE INSTRUCTIONS  WHAT YOU NEED TO KNOW:   An epidural steroid injection (KRYS) is a procedure to inject steroid medicine into the epidural space. The epidural space is between your spinal cord and vertebrae. Steroids reduce inflammation and fluid buildup in your spine that may be causing pain. You may be given pain medicine along with the steroids.        DISCHARGE INSTRUCTIONS:   Call your local emergency number (911 in the [place]) if:   You have a seizure.    You have trouble moving your legs.    Seek care immediately if:   Blood soaks through your bandage.    You have a fever or chills, severe back pain, and the procedure area is sensitive to the touch.    You cannot control when you urinate or have a bowel movement.    Call your doctor if:   You have weakness or numbness in your legs.    Your wound is red, swollen, or draining pus.    You have nausea or are vomiting.    Your face or neck is red and you feel warm.    You have more pain than you had before the procedure.    You have swelling in your hands or feet.    You have questions or concerns about your condition or care.    Care for your wound as directed:  You may remove the bandage before you go to bed the day of your procedure. You may take a shower, but do not take a bath for at least 24 hours.   Self-care:   Do not drive,  use machines, or do strenuous activity for 24 hours after your procedure or as directed.     Continue other treatments  as directed. Steroid injections alone will not control your pain. The injections are meant to be used with other treatments, such as physical therapy.    Follow up with your doctor as directed:  Write down your questions so you remember to ask them during your visits.           ACTIVITY  Do not drive or operate machinery today.  No strenuous  activity today - bending, lifting, etc.   You may resume normal activities starting tomorrow - start slowly and as tolerated.  You may shower today, but not tub baths or hot tubs.  You may have numbness for several hours from the local anesthetics. Please use caution and common sense, especially with weight-bearing activities.    CARE OF THE INJECTION SITE  If you have soreness or pain apply ice to the area today (20 minutes on and 20 minutes off).  Starting tomorrow, you may resume normal activities  Notify the [facility] if you have any of the following: redness, drainage, swelling or fever above 100°F.      MEDICATIONS  Continue to take all routine medications.  Our office may have instructed you to hold some medications. You may restart medications, including blood thinners.

## 2024-11-08 RX ORDER — SEMAGLUTIDE 2.4 MG/.75ML
2.4 INJECTION, SOLUTION SUBCUTANEOUS WEEKLY
Qty: 3 ML | Refills: 0 | Status: SHIPPED | OUTPATIENT
Start: 2024-11-08

## 2024-11-25 ENCOUNTER — OFFICE VISIT (OUTPATIENT)
Dept: PAIN MEDICINE | Facility: CLINIC | Age: 69
End: 2024-11-25
Payer: COMMERCIAL

## 2024-11-25 VITALS
SYSTOLIC BLOOD PRESSURE: 122 MMHG | HEIGHT: 62 IN | OXYGEN SATURATION: 96 % | HEART RATE: 76 BPM | WEIGHT: 150 LBS | RESPIRATION RATE: 18 BRPM | DIASTOLIC BLOOD PRESSURE: 82 MMHG | TEMPERATURE: 97.9 F | BODY MASS INDEX: 27.6 KG/M2

## 2024-11-25 DIAGNOSIS — M19.012 PRIMARY OSTEOARTHRITIS OF LEFT SHOULDER: ICD-10-CM

## 2024-11-25 DIAGNOSIS — M54.14 THORACIC RADICULITIS: Primary | ICD-10-CM

## 2024-11-25 PROCEDURE — 99214 OFFICE O/P EST MOD 30 MIN: CPT | Performed by: ANESTHESIOLOGY

## 2024-11-25 RX ORDER — DULOXETIN HYDROCHLORIDE 30 MG/1
30 CAPSULE, DELAYED RELEASE ORAL DAILY
Qty: 30 CAPSULE | Refills: 2 | Status: SHIPPED | OUTPATIENT
Start: 2024-11-25

## 2024-11-25 NOTE — PROGRESS NOTES
Assessment  1. Thoracic radiculitis  -     DULoxetine (CYMBALTA) 30 mg delayed release capsule; Take 1 capsule (30 mg total) by mouth daily  2. Primary osteoarthritis of left shoulder  -     DULoxetine (CYMBALTA) 30 mg delayed release capsule; Take 1 capsule (30 mg total) by mouth daily    Greater than 70% relief of pain with improved ability to participate with IADLs after T11-T12 ILESI for mid back pain with left shoulder US guided injection; prior procedures provided relief for more than 6 months. Mri was reviewed showing small right subarticular disc protrusion at levels T11-12 and T12-L1 mildly indenting right ventral thecal sac without significant canal stenosis which was contributory. Additionally she describes left shoulder pain with overhead maneuvers, pain with lying on that side. Ttp over left sits muscles, normal ROM, no significant pain at present with abduction/external rotation; xray noncontributory.  Previously reported the following symptomatology:     Axial mid back pain described primarily by arthritic features with radicular pain in T7-T11 dermatome.  Aching, nagging, indolent, stabbing, throbbing features in axial mid back with radicular components.  5/5 strength bilaterally in upper and lower extremities, negative SLR.  Positive facet loading maneuvers in thoracic spine elicited pain, positive tenderness to palpation over thoracic paraspinal muscles more right sided.  xray thoracic spine noncontributory;however, considerable spondylosis noted in lumbar spine.  Currently she is neurologically intact without gait instability, saddle anesthesia or bowel/bladder abnormality. Risks, benefits and alternatives to KRYS, medial branch blocks and subsequent radiofrequency ablation if successful thoroughly discussed with patient.  Handouts provided questions answered to patient satisfaction.    The patient has been experiencing moderate to severe axial spine pain that is causing functional deficit.   The pain has been present for at least 3 months and is not improving with conservative care.  Currently the patient is not experiencing any radicular features nor neurogenic claudication.  Non-facet pathology has been ruled out on clinical evaluation.      Plan  -f/u 3 months or prn  -voltaren gel rx to be applied to left shoulder 4x daily prn pain  -cymbalta 30mg/day ordered; counseled regarding sedative effects of taking this medication and provided up titration calendar.  Counseled not to take medication while driving or operating heavy machinery/using stairs  -has completed formal physical therapy for right-sided mid back pain/thoracic spondylosis and muscle spasm; Physician directed home exercise plan as per [facility] demonstrated and handouts provided that patient plans to participate with for 1 hour, twice a week for the next 6 weeks.     There are risks associated with opioid medications, including dependence, addiction and tolerance. The patient understands and agrees to use these medications only as prescribed. Potential side effects of the medications include, but are not limited to, constipation, drowsiness, addiction, impaired judgment and risk of fatal overdose if not taken as prescribed. The patient was warned against driving while taking sedation medications or operating heavy machinery. The patient voiced understanding. Sharing medications is a felony. At this point in time, the patient is showing no signs of addiction, abuse, diversion or suicidal ideation.     [place] Prescription Drug Monitoring Program report was reviewed and was appropriate      Complete risks and benefits including bleeding, infection, tissue reaction, nerve injury and allergic reaction were discussed. The approach was demonstrated using models and literature was provided. Verbal and written consent was obtained.     My impressions and treatment recommendations were discussed in detail with the patient who verbalized  understanding and had no further questions.  Discharge instructions were provided. I personally saw and examined the patient and I agree with the above discussed plan of care.      New Medications Ordered This Visit   Medications    DULoxetine (CYMBALTA) 30 mg delayed release capsule     Sig: Take 1 capsule (30 mg total) by mouth daily     Dispense:  30 capsule     Refill:  2       History of Present Illness    Jennifer Carrillo is a 69 y.o. female with pmhx of prediabetes presenting with chronic mid back pain described primarily as arthritic in nature. She describes 8/10 ,od back pain that is worse in the mornings and worse at the end of the day.  The pain is characterized by achy, nagging, indolent, crampy, stabbing pain in her axial mid back.  The patient describes that the pain is worse with standing for long periods of time on hard surfaces as well as with walking.  The patient is a very active individual and feels as though this pain compromises his participation with independent activities of daily living. The pain can be debilitating at times and contribute to significant disability, compromising overall activity and independent activities of daily living.  She has not yet trialed PT formally.  Medications the patient has tried in the past include nsaids, tylenol. She describes b/l T7-T11 radicular symptoms and has good strength.  She denies any weakness numbness or paresthesias. The patient denies any bowel or bladder dysfunction, saddle anesthesia or gait instability as well.      I have personally reviewed and/or updated the patient's past medical history, past surgical history, family history, social history, current medications, allergies, and vital signs today.     Review of Systems   Constitutional:  Positive for activity change.   HENT: Negative.     Eyes: Negative.    Respiratory: Negative.     Cardiovascular: Negative.    Gastrointestinal: Negative.    Endocrine: Negative.    Genitourinary: Negative.     Musculoskeletal:  Positive for arthralgias, back pain and myalgias. Negative for gait problem.   Skin: Negative.    Allergic/Immunologic: Negative.    Neurological:  Negative for weakness and numbness.   Hematological: Negative.    Psychiatric/Behavioral: Negative.     All other systems reviewed and are negative.      Patient Active Problem List   Diagnosis    Sphincter of Oddi dysfunction    Arthritis    Elevated hemoglobin A1c    Hepatic steatosis    Nasal congestion    Chest congestion    Tracheitis    Thoracic radiculitis    Primary osteoarthritis of left shoulder       Past Medical History:   Diagnosis Date    Allergic Several years ago    Arthritis Several years ago    Bronchitis     Elevated hemoglobin A1c     Pt states she does not take medication but is trying to lower with diet and exercise ( 7.1)    Hypertension     Kidney stone Several years ago    Lipoma of arm     LUE    Obesity Several years ago    Sphincter of Oddi dysfunction        Past Surgical History:   Procedure Laterality Date    BLADDER SUSPENSION      BREAST EXCISIONAL BIOPSY Left 2000    benign    CHOLECYSTECTOMY      COLONOSCOPY      HYSTERECTOMY  1980    IR SPINE AND PAIN PROCEDURE  4/16/2024    IR SPINE AND PAIN PROCEDURE  11/5/2024    OOPHORECTOMY Bilateral 1980    DC EXC B9 LESION MRGN XCP SK TG T/A/L 3.1-4.0 CM Left 06/03/2022    Procedure: UPPER ARM MASS EXCISION;  Surgeon: Krys Mendiola DO;  Location:  MAIN OR;  Service: General    RECTAL PROLAPSE REPAIR         Family History   Problem Relation Age of Onset    No Known Problems Mother     Diabetes Father     No Known Problems Sister     No Known Problems Daughter     Leukemia Maternal Grandmother     No Known Problems Maternal Grandfather     No Known Problems Paternal Grandmother     No Known Problems Paternal Grandfather     No Known Problems Maternal Aunt     No Known Problems Maternal Aunt     No Known Problems Paternal Aunt     No Known Problems Paternal Aunt     No  "Known Problems Sister     No Known Problems Daughter     BRCA2 Positive Neg Hx     BRCA2 Negative Neg Hx     BRCA1 Positive Neg Hx     BRCA1 Negative Neg Hx     BRCA 1/2 Neg Hx     Ovarian cancer Neg Hx     Endometrial cancer Neg Hx     Colon cancer Neg Hx     Breast cancer additional onset Neg Hx     Breast cancer Neg Hx        Social History     Occupational History    Not on file   Tobacco Use    Smoking status: Former     Current packs/day: 0.00     Average packs/day: 0.5 packs/day for 21.4 years (10.7 ttl pk-yrs)     Types: Cigarettes     Start date: 1972     Quit date: 1993     Years since quittin.5     Passive exposure: Never    Smokeless tobacco: Never   Vaping Use    Vaping status: Never Used   Substance and Sexual Activity    Alcohol use: Never    Drug use: Never    Sexual activity: Not Currently     Partners: Male     Birth control/protection: None       Current Outpatient Medications on File Prior to Visit   Medication Sig    amLODIPine (NORVASC) 10 mg tablet Take 1 tablet (10 mg total) by mouth daily    estradiol (Estrace) 0.5 MG tablet Take 1 tablet (0.5 mg total) by mouth daily    Semaglutide-Weight Management (Wegovy) 2.4 MG/0.75ML Inject 0.75 mL (2.4 mg total) under the skin once a week    Sennosides (Laxative Pills) 25 MG TABS Take 2 tablets by mouth daily at bedtime     No current facility-administered medications on file prior to visit.       Allergies   Allergen Reactions    Cayenne - Food Allergy Shortness Of Breath     Pt states she coughs and then can't take breaths in    Pineapple - Food Allergy Shortness Of Breath     Pt states she has a terrible cough and can't breath in    Demerol [Meperidine] Itching    Oxycodone Itching    Propoxyphene Itching    Tegaderm Alginate Ag Rope Rash and Swelling         Physical Exam    /82 (BP Location: Left arm, Patient Position: Sitting, Cuff Size: Adult)   Pulse 76   Temp 97.9 °F (36.6 °C) (Temporal)   Resp 18   Ht 5' 2\" (1.575 m)  "  Wt 68 kg (150 lb)   SpO2 96%   BMI 27.44 kg/m²     Constitutional: normal, well developed, well nourished, alert, in no distress and non-toxic and no overt pain behavior. and overweight  Eyes: anicteric  HEENT: grossly intact  Neck: supple, symmetric, trachea midline and no masses   Pulmonary:even and unlabored  Cardiovascular:No edema or pitting edema present  Skin:Normal without rashes or lesions and well hydrated  Psychiatric:Mood and affect appropriate  Neurologic:Cranial Nerves II-XII grossly intact Sensation grossly intact; no clonus negative romero's. Reflexes 2+ and brisk. SLR negative bilaterally. Spurling's maneuver negative bilaterally.  Musculoskeletal:normal gait. 5/5 strength bilaterally with AROM in all extremities. Normal heel toe and tip toe walking. Significant pain with thoracic facet loading bilaterally and with lateral spine rotation, right greater than left. ttp over right sided thoracic paraspinal muscles. Negative bhavin's test, negative gaenslen's negative SIJ loading bilaterally.    Imaging    MRI THORACIC SPINE WITHOUT CONTRAST     INDICATION: M47.814: Spondylosis without myelopathy or radiculopathy, thoracic region.     COMPARISON: X-ray thoracic spine 5/9/2022     TECHNIQUE:  Multiplanar, multisequence imaging of the thoracic spine was performed. .     IMAGE QUALITY: Diagnostic.     FINDINGS:     ALIGNMENT: Alignment is unremarkable.     MARROW SIGNAL: No bone marrow signal abnormality or suspicious discrete lesion.     THORACIC CORD: Normal signal within the thoracic cord.     PARAVERTEBRAL SOFT TISSUES:  Normal.     THORACIC DEGENERATIVE CHANGE:     Small right subarticular disc protrusion at levels T11-12 and T12-L1 mildly indenting right ventral thecal sac without significant canal stenosis.  Minimal disc bulge at the levels T7 through T11 with tiny disc protrusions (left subarticular T7-8 and right subarticular T8-9). There is minor canal narrowing at these levels.      Incompletely evaluated (partially imaged) degenerative narrowing in the mid to low cervical spine.     OTHER FINDINGS: Right renal cysts.     IMPRESSION:     Mild degenerative change in the mid to lower thoracic spine as described. No high-grade canal or foraminal stenosis.

## 2024-11-25 NOTE — PATIENT INSTRUCTIONS
Patient Education     Duloxetine (doo LOX e teen)   Brand Names: US Cymbalta; Drizalma Sprinkle [DSC]   Brand Names: Hai ACCEL-Duloxetine; AG-Duloxetine; APO-Duloxetine; Auro-Duloxetine; BIO-Duloxetine; Cymbalta; JAMP-Duloxetine; M-Duloxetine; Mar-Duloxetine; MINT-Duloxetine; NRA-Duloxetine; PMS-Duloxetine; PRIVA-Duloxetine [DSC]; RAN-Duloxetine; MARTÍN-Duloxetine [DSC]; SANDOZ Duloxetine; TEVA-Duloxetine   Warning   Drugs like this one have raised the chance of suicidal thoughts or actions in children and young adults. The risk may be greater in people who have had these thoughts or actions in the past. All people who take this drug need to be watched closely. Call the doctor right away if signs like depression, nervousness, restlessness, grouchiness, panic attacks, or changes in mood or actions are new or worse. Call the doctor right away if any thoughts or actions of suicide occur.  What is this drug used for?   It is used to treat depression.  It is used to treat anxiety.  It is used to help painful nerve diseases and diabetic nerve problems.  It is used to ease long-term pain problems.  It is used to treat fibromyalgia.  It may be given to you for other reasons. Talk with the doctor.  What do I need to tell my doctor BEFORE I take this drug?   If you are allergic to this drug; any part of this drug; or any other drugs, foods, or substances. Tell your doctor about the allergy and what signs you had.  If you have any of these health problems: Kidney disease or liver disease.  If you are taking thioridazine.  If you are taking any of these drugs: Ciprofloxacin or fluvoxamine.  If you are taking any of these drugs: Linezolid or methylene blue.  If you have taken certain drugs for depression or Parkinson's disease in the last 14 days. This includes isocarboxazid, phenelzine, tranylcypromine, selegiline, or rasagiline. Very high blood pressure may happen.  This is not a list of all drugs or health problems that  interact with this drug.  Tell your doctor and pharmacist about all of your drugs (prescription or OTC, natural products, vitamins) and health problems. You must check to make sure that it is safe for you to take this drug with all of your drugs and health problems. Do not start, stop, or change the dose of any drug without checking with your doctor.  What are some things I need to know or do while I take this drug?   Tell all of your health care providers that you take this drug. This includes your doctors, nurses, pharmacists, and dentists.  Avoid driving and doing other tasks or actions that call for you to be alert until you see how this drug affects you.  To lower the chance of feeling dizzy or passing out, rise slowly if you have been sitting or lying down. Be careful going up and down stairs.  Low blood pressure, falls, and passing out have happened with this drug. Falls may lead to problems like broken bones and the need to go to the hospital. The chance of falling is raised with older people. Talk with the doctor.  If you have high blood sugar (diabetes), you will need to watch your blood sugar closely.  High blood pressure has happened with this drug. Have your blood pressure checked as you have been told by your doctor.  Talk with your doctor before you use alcohol, marijuana or other forms of cannabis, or prescription or OTC drugs that may slow your actions.  This drug may raise the chance of bleeding. Sometimes, bleeding can be life-threatening. Talk with the doctor.  A severe and sometimes deadly problem called serotonin syndrome may happen. The risk may be greater if you also take certain other drugs. Call your doctor right away if you have agitation; change in balance; confusion; hallucinations; fever; fast or abnormal heartbeat; flushing; muscle twitching or stiffness; seizures; shivering or shaking; sweating a lot; severe diarrhea, upset stomach, or throwing up; or very bad headache.  Some people  may have a higher chance of eye problems with this drug. Your doctor may want you to have an eye exam to see if you have a higher chance of these eye problems. Call your doctor right away if you have eye pain, change in eyesight, or swelling or redness in or around the eye.  This drug can cause low sodium levels. Very low sodium levels can be life-threatening, leading to seizures, passing out, trouble breathing, or death.  This drug may affect certain lab tests. Tell all of your health care providers and lab workers that you take this drug.  If you are 65 or older, use this drug with care. You could have more side effects.  This drug may affect growth in children and teens in some cases. They may need regular growth checks. Talk with the doctor.  Tell your doctor if you are pregnant, may be pregnant, or plan to get pregnant while taking this drug. You will need to talk about the benefits and risks to you and the baby. Taking this drug in the third trimester of pregnancy may raise your risk of bleeding after delivery and may lead to some health problems in the .  Tell your doctor if you are breast-feeding or plan to breast-feed. This drug passes into breast milk and may harm your baby.  What are some side effects that I need to call my doctor about right away?   WARNING/CAUTION: Even though it may be rare, some people may have very bad and sometimes deadly side effects when taking a drug. Tell your doctor or get medical help right away if you have any of the following signs or symptoms that may be related to a very bad side effect:  Signs of an allergic reaction, like rash; hives; itching; red, swollen, blistered, or peeling skin with or without fever; wheezing; tightness in the chest or throat; trouble breathing, swallowing, or talking; unusual hoarseness; or swelling of the mouth, face, lips, tongue, or throat.  Signs of low sodium levels like headache, trouble focusing, memory problems, feeling confused,  weakness, seizures, or change in balance.  Signs of bleeding like throwing up or coughing up blood; vomit that looks like coffee grounds; blood in the urine; black, red, or tarry stools; bleeding from the gums; abnormal vaginal bleeding; bruises without a cause or that get bigger; or bleeding you cannot stop.  Signs of high or low blood pressure like very bad headache or dizziness, passing out, or change in eyesight.  Seizures.  Trouble passing urine.  Sex problems have happened with drugs like this one. This includes lowered interest in sex, trouble having an orgasm, ejaculation problems, or trouble getting or keeping an erection. If you have any questions, talk with your doctor.  Liver problems have happened with this drug. Rarely, this has been deadly. Call your doctor right away if you have signs of liver problems like dark urine, tiredness, decreased appetite, upset stomach or stomach pain, light-colored stools, throwing up, or yellow skin or eyes.  A severe skin reaction (Lira-Juice syndrome/toxic epidermal necrolysis) may happen. It can cause severe health problems that may not go away, and sometimes death. Get medical help right away if you have signs like red, swollen, blistered, or peeling skin (with or without fever); red or irritated eyes; or sores in your mouth, throat, nose, or eyes.  What are some other side effects of this drug?   All drugs may cause side effects. However, many people have no side effects or only have minor side effects. Call your doctor or get medical help if any of these side effects or any other side effects bother you or do not go away:  Constipation, diarrhea, stomach pain, upset stomach, throwing up, or decreased appetite.  Headache.  Dry mouth.  Trouble sleeping.  Feeling dizzy, sleepy, tired, or weak.  Sweating a lot.  Weight loss.  Nose or throat irritation.  These are not all of the side effects that may occur. If you have questions about side effects, call your  doctor. Call your doctor for medical advice about side effects.  You may report side effects to your national health agency.  You may report side effects to the FDA at 1-548.539.1225. You may also report side effects at https://www.fda.gov/medwatch.  How is this drug best taken?   Use this drug as ordered by your doctor. Read all information given to you. Follow all instructions closely.  All products:   Keep taking this drug as you have been told by your doctor or other health care provider, even if you feel well.  Take with or without food.  Do not stop taking this drug all of a sudden without calling your doctor. You may have a greater risk of side effects. If you need to stop this drug, you will want to slowly stop it as ordered by your doctor.  Capsules:   Swallow whole. Do not chew, open, or crush.  Sprinkle capsules :  Swallow whole. Do not chew or crush.  If you cannot swallow this drug whole, you may sprinkle the contents on applesauce. If you do this, swallow the mixture right away without chewing.  Those who have feeding tubes may use this drug. Use as you have been told. Flush the feeding tube after this drug is given.  What do I do if I miss a dose?   Take a missed dose as soon as you think about it.  If it is close to the time for your next dose, skip the missed dose and go back to your normal time.  Do not take 2 doses at the same time or extra doses.  How do I store and/or throw out this drug?   Store at room temperature in a dry place. Do not store in a bathroom.  Keep all drugs in a safe place. Keep all drugs out of the reach of children and pets.  Throw away unused or  drugs. Do not flush down a toilet or pour down a drain unless you are told to do so. Check with your pharmacist if you have questions about the best way to throw out drugs. There may be drug take-back programs in your area.  General drug facts   If your symptoms or health problems do not get better or if they become worse,  call your doctor.  Do not share your drugs with others and do not take anyone else's drugs.  Some drugs may have another patient information leaflet. If you have any questions about this drug, please talk with your doctor, nurse, pharmacist, or other health care provider.  This drug comes with an extra patient fact sheet called a Medication Guide. Read it with care. Read it again each time this drug is refilled. If you have any questions about this drug, please talk with the doctor, pharmacist, or other health care provider.  If you think there has been an overdose, call your poison control center or get medical care right away. Be ready to tell or show what was taken, how much, and when it happened.  Consumer Information Use and Disclaimer   This generalized information is a limited summary of diagnosis, treatment, and/or medication information. It is not meant to be comprehensive and should be used as a tool to help the user understand and/or assess potential diagnostic and treatment options. It does NOT include all information about conditions, treatments, medications, side effects, or risks that may apply to a specific patient. It is not intended to be medical advice or a substitute for the medical advice, diagnosis, or treatment of a health care provider based on the health care provider's examination and assessment of a patient's specific and unique circumstances. Patients must speak with a health care provider for complete information about their health, medical questions, and treatment options, including any risks or benefits regarding use of medications. This information does not endorse any treatments or medications as safe, effective, or approved for treating a specific patient. [employer] and its affiliates disclaim any warranty or liability relating to this information or the use thereof. The use of this information is governed by the Terms of Use, available at  https://www.wolterskluwer.com/en/know/clinical-effectiveness-terms.  Last Reviewed Date   2023-09-05  Copyright   © 2024 [employer] and its affiliates and/or licensors. All rights reserved.

## 2024-12-03 RX ORDER — SEMAGLUTIDE 2.4 MG/.75ML
2.4 INJECTION, SOLUTION SUBCUTANEOUS WEEKLY
Qty: 3 ML | Refills: 0 | Status: SHIPPED | OUTPATIENT
Start: 2024-12-03

## 2024-12-21 DIAGNOSIS — M54.14 THORACIC RADICULITIS: ICD-10-CM

## 2024-12-21 DIAGNOSIS — M19.012 PRIMARY OSTEOARTHRITIS OF LEFT SHOULDER: ICD-10-CM

## 2024-12-23 RX ORDER — DULOXETIN HYDROCHLORIDE 30 MG/1
30 CAPSULE, DELAYED RELEASE ORAL DAILY
Qty: 30 CAPSULE | Refills: 0 | Status: SHIPPED | OUTPATIENT
Start: 2024-12-23

## 2024-12-23 RX ORDER — SEMAGLUTIDE 2.4 MG/.75ML
2.4 INJECTION, SOLUTION SUBCUTANEOUS WEEKLY
Qty: 3 ML | Refills: 0 | Status: SHIPPED | OUTPATIENT
Start: 2024-12-23

## 2024-12-23 NOTE — TELEPHONE ENCOUNTER
Attempted to contact Pt. ABHIOM. Provided with CB# and OH.    Left message refill was sent to pharmacy.   Patient states that when she removed the surgical stocking from her rt knee she noticed a lot of bruising where the elastic was.  She is requesting a call back this morning.   Minocycline Counseling: Patient advised regarding possible photosensitivity and discoloration of the teeth, skin, lips, tongue and gums.  Patient instructed to avoid sunlight, if possible.  When exposed to sunlight, patients should wear protective clothing, sunglasses, and sunscreen.  The patient was instructed to call the office immediately if the following severe adverse effects occur:  hearing changes, easy bruising/bleeding, severe headache, or vision changes.  The patient verbalized understanding of the proper use and possible adverse effects of minocycline.  All of the patient's questions and concerns were addressed.

## 2025-01-15 RX ORDER — SEMAGLUTIDE 2.4 MG/.75ML
2.4 INJECTION, SOLUTION SUBCUTANEOUS WEEKLY
Qty: 3 ML | Refills: 0 | Status: SHIPPED | OUTPATIENT
Start: 2025-01-15

## 2025-01-23 DIAGNOSIS — M19.012 PRIMARY OSTEOARTHRITIS OF LEFT SHOULDER: ICD-10-CM

## 2025-01-23 DIAGNOSIS — M54.14 THORACIC RADICULITIS: ICD-10-CM

## 2025-01-23 RX ORDER — DULOXETIN HYDROCHLORIDE 30 MG/1
30 CAPSULE, DELAYED RELEASE ORAL DAILY
Qty: 30 CAPSULE | Refills: 0 | Status: SHIPPED | OUTPATIENT
Start: 2025-01-23

## 2025-01-27 DIAGNOSIS — I10 HTN (HYPERTENSION): ICD-10-CM

## 2025-01-27 DIAGNOSIS — M54.14 THORACIC RADICULITIS: ICD-10-CM

## 2025-01-27 DIAGNOSIS — M19.012 PRIMARY OSTEOARTHRITIS OF LEFT SHOULDER: ICD-10-CM

## 2025-01-27 RX ORDER — DULOXETIN HYDROCHLORIDE 30 MG/1
30 CAPSULE, DELAYED RELEASE ORAL DAILY
Qty: 30 CAPSULE | Refills: 0 | Status: SHIPPED | OUTPATIENT
Start: 2025-01-27

## 2025-01-27 RX ORDER — AMLODIPINE BESYLATE 10 MG/1
10 TABLET ORAL DAILY
Qty: 90 TABLET | Refills: 2 | Status: SHIPPED | OUTPATIENT
Start: 2025-01-27

## 2025-01-28 RX ORDER — SEMAGLUTIDE 2.4 MG/.75ML
2.4 INJECTION, SOLUTION SUBCUTANEOUS WEEKLY
Qty: 3 ML | Refills: 0 | Status: SHIPPED | OUTPATIENT
Start: 2025-01-28

## 2025-02-04 ENCOUNTER — TELEPHONE (OUTPATIENT)
Age: 70
End: 2025-02-04

## 2025-02-04 NOTE — TELEPHONE ENCOUNTER
Pt called, Is kindly requesting we send a copy of the [employer] [place] card to her via My chart message: send as attachment  (Media file 8/6/2020)  She needs for SSI purposes  Thank you.

## 2025-02-14 DIAGNOSIS — M54.14 THORACIC RADICULITIS: ICD-10-CM

## 2025-02-14 DIAGNOSIS — M19.012 PRIMARY OSTEOARTHRITIS OF LEFT SHOULDER: ICD-10-CM

## 2025-02-14 RX ORDER — DULOXETIN HYDROCHLORIDE 30 MG/1
30 CAPSULE, DELAYED RELEASE ORAL DAILY
Qty: 30 CAPSULE | Refills: 0 | Status: SHIPPED | OUTPATIENT
Start: 2025-02-14 | End: 2025-02-18

## 2025-02-18 RX ORDER — DULOXETIN HYDROCHLORIDE 30 MG/1
30 CAPSULE, DELAYED RELEASE ORAL DAILY
Qty: 30 CAPSULE | Refills: 0 | Status: SHIPPED | OUTPATIENT
Start: 2025-02-18

## 2025-02-24 ENCOUNTER — APPOINTMENT (OUTPATIENT)
Dept: LAB | Facility: CLINIC | Age: 70
End: 2025-02-24
Payer: COMMERCIAL

## 2025-02-24 ENCOUNTER — OFFICE VISIT (OUTPATIENT)
Dept: FAMILY MEDICINE CLINIC | Facility: CLINIC | Age: 70
End: 2025-02-24
Payer: COMMERCIAL

## 2025-02-24 VITALS
HEART RATE: 100 BPM | HEIGHT: 62 IN | SYSTOLIC BLOOD PRESSURE: 116 MMHG | BODY MASS INDEX: 26.31 KG/M2 | OXYGEN SATURATION: 98 % | WEIGHT: 143 LBS | DIASTOLIC BLOOD PRESSURE: 66 MMHG

## 2025-02-24 DIAGNOSIS — M19.90 ARTHRITIS: ICD-10-CM

## 2025-02-24 DIAGNOSIS — Z53.20 COLON CANCER SCREENING DECLINED: ICD-10-CM

## 2025-02-24 DIAGNOSIS — Z13.220 SCREENING FOR CHOLESTEROL LEVEL: ICD-10-CM

## 2025-02-24 DIAGNOSIS — Z53.20 MAMMOGRAM DECLINED: ICD-10-CM

## 2025-02-24 DIAGNOSIS — R73.09 ELEVATED HEMOGLOBIN A1C: ICD-10-CM

## 2025-02-24 DIAGNOSIS — Z00.00 ANNUAL PHYSICAL EXAM: Primary | ICD-10-CM

## 2025-02-24 DIAGNOSIS — K76.0 HEPATIC STEATOSIS: ICD-10-CM

## 2025-02-24 DIAGNOSIS — K83.4 SPHINCTER OF ODDI DYSFUNCTION: ICD-10-CM

## 2025-02-24 DIAGNOSIS — Z53.20 OSTEOPOROSIS SCREENING DECLINED: ICD-10-CM

## 2025-02-24 LAB
ALBUMIN SERPL BCG-MCNC: 4.6 G/DL (ref 3.5–5)
ALP SERPL-CCNC: 65 U/L (ref 34–104)
ALT SERPL W P-5'-P-CCNC: 11 U/L (ref 7–52)
ANION GAP SERPL CALCULATED.3IONS-SCNC: 7 MMOL/L (ref 4–13)
AST SERPL W P-5'-P-CCNC: 16 U/L (ref 13–39)
BILIRUB SERPL-MCNC: 0.68 MG/DL (ref 0.2–1)
BUN SERPL-MCNC: 18 MG/DL (ref 5–25)
CALCIUM SERPL-MCNC: 9.5 MG/DL (ref 8.4–10.2)
CHLORIDE SERPL-SCNC: 105 MMOL/L (ref 96–108)
CHOLEST SERPL-MCNC: 180 MG/DL (ref ?–200)
CO2 SERPL-SCNC: 27 MMOL/L (ref 21–32)
CREAT SERPL-MCNC: 0.95 MG/DL (ref 0.6–1.3)
EST. AVERAGE GLUCOSE BLD GHB EST-MCNC: 94 MG/DL
GFR SERPL CREATININE-BSD FRML MDRD: 61 ML/MIN/1.73SQ M
GLUCOSE P FAST SERPL-MCNC: 104 MG/DL (ref 65–99)
HBA1C MFR BLD: 4.9 %
HDLC SERPL-MCNC: 62 MG/DL
LDLC SERPL CALC-MCNC: 96 MG/DL (ref 0–100)
NONHDLC SERPL-MCNC: 118 MG/DL
POTASSIUM SERPL-SCNC: 4.1 MMOL/L (ref 3.5–5.3)
PROT SERPL-MCNC: 7 G/DL (ref 6.4–8.4)
SODIUM SERPL-SCNC: 139 MMOL/L (ref 135–147)
TRIGL SERPL-MCNC: 112 MG/DL (ref ?–150)

## 2025-02-24 PROCEDURE — 83036 HEMOGLOBIN GLYCOSYLATED A1C: CPT

## 2025-02-24 PROCEDURE — 80053 COMPREHEN METABOLIC PANEL: CPT

## 2025-02-24 PROCEDURE — 99396 PREV VISIT EST AGE 40-64: CPT | Performed by: NURSE PRACTITIONER

## 2025-02-24 PROCEDURE — 36415 COLL VENOUS BLD VENIPUNCTURE: CPT

## 2025-02-24 PROCEDURE — 99397 PER PM REEVAL EST PAT 65+ YR: CPT | Performed by: NURSE PRACTITIONER

## 2025-02-24 PROCEDURE — 80061 LIPID PANEL: CPT

## 2025-02-24 NOTE — PATIENT INSTRUCTIONS
"Patient Education     Routine physical for adults   The Basics   Written by the doctors and editors at [facility]   What is a physical? -- A physical is a routine visit, or \"check-up,\" with your doctor. You might also hear it called a \"wellness visit\" or \"preventive visit.\"  During each visit, the doctor will:   Ask about your physical and mental health   Ask about your habits, behaviors, and lifestyle   Do an exam   Give you vaccines if needed   Talk to you about any medicines you take   Give advice about your health   Answer your questions  Getting regular check-ups is an important part of taking care of your health. It can help your doctor find and treat any problems you have. But it's also important for preventing health problems.  A routine physical is different from a \"sick visit.\" A sick visit is when you see a doctor because of a health concern or problem. Since physicals are scheduled ahead of time, you can think about what you want to ask the doctor.  How often should I get a physical? -- It depends on your age and health. In general, for people age 21 years and older:   If you are younger than 50 years, you might be able to get a physical every 3 years.   If you are 50 years or older, your doctor might recommend a physical every year.  If you have an ongoing health condition, like diabetes or high blood pressure, your doctor will probably want to see you more often.  What happens during a physical? -- In general, each visit will include:   Physical exam - The doctor or nurse will check your height, weight, heart rate, and blood pressure. They will also look at your eyes and ears. They will ask about how you are feeling and whether you have any symptoms that bother you.   Medicines - It's a good idea to bring a list of all the medicines you take to each doctor visit. Your doctor will talk to you about your medicines and answer any questions. Tell them if you are having any side effects that bother you. You " "should also tell them if you are having trouble paying for any of your medicines.   Habits and behaviors - This includes:   Your diet   Your exercise habits   Whether you smoke, drink alcohol, or use drugs   Whether you are sexually active   Whether you feel safe at home  Your doctor will talk to you about things you can do to improve your health and lower your risk of health problems. They will also offer help and support. For example, if you want to quit smoking, they can give you advice and might prescribe medicines. If you want to improve your diet or get more physical activity, they can help you with this, too.   Lab tests, if needed - The tests you get will depend on your age and situation. For example, your doctor might want to check your:   Cholesterol   Blood sugar   Iron level   Vaccines - The recommended vaccines will depend on your age, health, and what vaccines you already had. Vaccines are very important because they can prevent certain serious or deadly infections.   Discussion of screening - \"Screening\" means checking for diseases or other health problems before they cause symptoms. Your doctor can recommend screening based on your age, risk, and preferences. This might include tests to check for:   Cancer, such as breast, prostate, cervical, ovarian, colorectal, prostate, lung, or skin cancer   Sexually transmitted infections, such as chlamydia and gonorrhea   Mental health conditions like depression and anxiety  Your doctor will talk to you about the different types of screening tests. They can help you decide which screenings to have. They can also explain what the results might mean.   Answering questions - The physical is a good time to ask the doctor or nurse questions about your health. If needed, they can refer you to other doctors or specialists, too.  Adults older than 65 years often need other care, too. As you get older, your doctor will talk to you about:   How to prevent falling at " home   Hearing or vision tests   Memory testing   How to take your medicines safely   Making sure that you have the help and support you need at home  All topics are updated as new evidence becomes available and our peer review process is complete.  This topic retrieved from [employer] on: May 02, 2024.  Topic 475096 Version 1.0  Release: 32.4.3 - C32.122  © 2024 UpToDate, Inc. and/or its affiliates. All rights reserved.  Consumer Information Use and Disclaimer   Disclaimer: This generalized information is a limited summary of diagnosis, treatment, and/or medication information. It is not meant to be comprehensive and should be used as a tool to help the user understand and/or assess potential diagnostic and treatment options. It does NOT include all information about conditions, treatments, medications, side effects, or risks that may apply to a specific patient. It is not intended to be medical advice or a substitute for the medical advice, diagnosis, or treatment of a health care provider based on the health care provider's examination and assessment of a patient's specific and unique circumstances. Patients must speak with a health care provider for complete information about their health, medical questions, and treatment options, including any risks or benefits regarding use of medications. This information does not endorse any treatments or medications as safe, effective, or approved for treating a specific patient. UpToDate, Inc. and its affiliates disclaim any warranty or liability relating to this information or the use thereof.The use of this information is governed by the Terms of Use, available at https://www.woltersBizAnytimeuwer.com/en/know/clinical-effectiveness-terms. 2024© UpToDate, Inc. and its affiliates and/or licensors. All rights reserved.  Copyright   © 2024 UpToDate, Inc. and/or its affiliates. All rights reserved.

## 2025-02-24 NOTE — PROGRESS NOTES
Adult Annual Physical  Name: Jennifer Carrillo      : 1955      MRN: [id number]  Encounter Provider: MARK Chatman  Encounter Date: 2025   Encounter department: [facility]    Assessment & Plan  Annual physical exam         BMI 26.0-26.9,adult  Continues to maintain her weight loss with maintenance dose of wegovy.         Hepatic steatosis  Blood work ordered to monitor.    Orders:  •  Comprehensive metabolic panel; Future  •  Lipid panel; Future    Screening for cholesterol level    Orders:  •  Comprehensive metabolic panel; Future  •  Lipid panel; Future    Arthritis  Suspects arthritis at [facility].         Elevated hemoglobin A1c    Orders:  •  Hemoglobin A1C; Future    Sphincter of Oddi dysfunction  Intermittent right sided mid back pain at times.         Mammogram declined         Osteoporosis screening declined         Colon cancer screening declined           Immunizations and preventive care screenings were discussed with patient today. Appropriate education was printed on patient's after visit summary.    Counseling:  Exercise: the importance of regular exercise/physical activity was discussed. Recommend exercise 3-5 times per week for at least 30 minutes.          History of Present Illness     Adult Annual Physical:  Patient presents for annual physical. Here for her annual -reports no new issues - is still doing well on the Wegovy. Is due for lab work.  BP is within range.  .     Diet and Physical Activity:  - Diet/Nutrition: well balanced diet.  - Exercise: walking. Has slept poorly since menopause    Depression Screening:  - PHQ-2 Score: 0    General Health:  - Sleep: sleeps poorly.  - Hearing: normal hearing right ear and normal hearing left ear.  - Vision: no vision problems.  - Dental: brushes teeth twice daily.    /GYN Health:  - Follows with GYN: yes.   - Menopause: postmenopausal.   - History of STDs: no  - Contraception: menopause.      Advanced Care  "Planning:  - Has an advanced directive?: no    - Has a durable medical POA?: no    - ACP document given to patient?: no      Review of Systems   Constitutional: Negative.    HENT: Negative.     Respiratory: Negative.     Cardiovascular: Negative.    Gastrointestinal: Negative.    Neurological: Negative.    All other systems reviewed and are negative.    Current Outpatient Medications on File Prior to Visit   Medication Sig Dispense Refill   • amLODIPine (NORVASC) 10 mg tablet Take 1 tablet (10 mg total) by mouth daily 90 tablet 2   • DULoxetine (CYMBALTA) 30 mg delayed release capsule TAKE 1 CAPSULE BY MOUTH EVERY DAY 30 capsule 0   • Semaglutide-Weight Management (Wegovy) 2.4 MG/0.75ML Inject 0.75 mL (2.4 mg total) under the skin once a week 3 mL 0   • Sennosides (Laxative Pills) 25 MG TABS Take 2 tablets by mouth daily at bedtime     • [DISCONTINUED] estradiol (Estrace) 0.5 MG tablet Take 1 tablet (0.5 mg total) by mouth daily (Patient not taking: Reported on 2/24/2025) 30 tablet 1     No current facility-administered medications on file prior to visit.        Objective   /66 (BP Location: Left arm, Patient Position: Sitting, Cuff Size: Standard)   Pulse 100   Ht 5' 2\" (1.575 m)   Wt 64.9 kg (143 lb)   SpO2 98%   BMI 26.16 kg/m²     Physical Exam  Cardiovascular:      Rate and Rhythm: Normal rate and regular rhythm.      Heart sounds: Normal heart sounds.   Pulmonary:      Effort: Pulmonary effort is normal.      Breath sounds: Normal breath sounds.   Neurological:      Mental Status: She is alert and oriented to person, place, and time.         "

## 2025-02-24 NOTE — ASSESSMENT & PLAN NOTE
Blood work ordered to monitor.    Orders:  •  Comprehensive metabolic panel; Future  •  Lipid panel; Future

## 2025-02-26 ENCOUNTER — RESULTS FOLLOW-UP (OUTPATIENT)
Dept: FAMILY MEDICINE CLINIC | Facility: CLINIC | Age: 70
End: 2025-02-26

## 2025-02-26 ENCOUNTER — TELEMEDICINE (OUTPATIENT)
Dept: PAIN MEDICINE | Facility: CLINIC | Age: 70
End: 2025-02-26
Payer: COMMERCIAL

## 2025-02-26 VITALS — WEIGHT: 143 LBS | BODY MASS INDEX: 26.31 KG/M2 | HEIGHT: 62 IN

## 2025-02-26 DIAGNOSIS — M54.14 THORACIC RADICULITIS: ICD-10-CM

## 2025-02-26 DIAGNOSIS — M19.012 PRIMARY OSTEOARTHRITIS OF LEFT SHOULDER: ICD-10-CM

## 2025-02-26 PROCEDURE — 99213 OFFICE O/P EST LOW 20 MIN: CPT | Performed by: ANESTHESIOLOGY

## 2025-02-26 RX ORDER — DULOXETIN HYDROCHLORIDE 30 MG/1
30 CAPSULE, DELAYED RELEASE ORAL DAILY
Qty: 90 CAPSULE | Refills: 3 | Status: SHIPPED | OUTPATIENT
Start: 2025-02-26

## 2025-02-26 NOTE — PATIENT INSTRUCTIONS
Patient Education     Duloxetine (doo LOX e teen)   Brand Names: US Cymbalta; Drizalma Sprinkle [DSC]   Brand Names: Hai ACCEL-Duloxetine; AG-Duloxetine; APO-Duloxetine; Auro-Duloxetine; BIO-Duloxetine; Cymbalta; JAMP-Duloxetine; M-Duloxetine; Mar-Duloxetine; MINT-Duloxetine; NRA-Duloxetine; PMS-Duloxetine; PRIVA-Duloxetine [DSC]; RAN-Duloxetine; MARTÍN-Duloxetine [DSC]; SANDOZ Duloxetine; TEVA-Duloxetine   Warning   Drugs like this one have raised the chance of suicidal thoughts or actions in children and young adults. The risk may be greater in people who have had these thoughts or actions in the past. All people who take this drug need to be watched closely. Call the doctor right away if signs like depression, nervousness, restlessness, grouchiness, panic attacks, or changes in mood or actions are new or worse. Call the doctor right away if any thoughts or actions of suicide occur.  What is this drug used for?   It is used to treat depression.  It is used to treat anxiety.  It is used to help painful nerve diseases and diabetic nerve problems.  It is used to ease long-term pain problems.  It is used to treat fibromyalgia.  It may be given to you for other reasons. Talk with the doctor.  What do I need to tell my doctor BEFORE I take this drug?   If you are allergic to this drug; any part of this drug; or any other drugs, foods, or substances. Tell your doctor about the allergy and what signs you had.  If you have any of these health problems: Kidney disease or liver disease.  If you are taking thioridazine.  If you are taking any of these drugs: Ciprofloxacin or fluvoxamine.  If you are taking any of these drugs: Linezolid or methylene blue.  If you have taken certain drugs for depression or Parkinson's disease in the last 14 days. This includes isocarboxazid, phenelzine, tranylcypromine, selegiline, or rasagiline. Very high blood pressure may happen.  This is not a list of all drugs or health problems that  interact with this drug.  Tell your doctor and pharmacist about all of your drugs (prescription or OTC, natural products, vitamins) and health problems. You must check to make sure that it is safe for you to take this drug with all of your drugs and health problems. Do not start, stop, or change the dose of any drug without checking with your doctor.  What are some things I need to know or do while I take this drug?   Tell all of your health care providers that you take this drug. This includes your doctors, nurses, pharmacists, and dentists.  Avoid driving and doing other tasks or actions that call for you to be alert until you see how this drug affects you.  To lower the chance of feeling dizzy or passing out, rise slowly if you have been sitting or lying down. Be careful going up and down stairs.  Low blood pressure, falls, and passing out have happened with this drug. Falls may lead to problems like broken bones and the need to go to the hospital. The chance of falling is raised with older people. Talk with the doctor.  If you have high blood sugar (diabetes), you will need to watch your blood sugar closely.  High blood pressure has happened with this drug. Have your blood pressure checked as you have been told by your doctor.  Talk with your doctor before you use alcohol, marijuana or other forms of cannabis, or prescription or OTC drugs that may slow your actions.  This drug may raise the chance of bleeding. Sometimes, bleeding can be life-threatening. Talk with the doctor.  A severe and sometimes deadly problem called serotonin syndrome may happen. The risk may be greater if you also take certain other drugs. Call your doctor right away if you have agitation; change in balance; confusion; hallucinations; fever; fast or abnormal heartbeat; flushing; muscle twitching or stiffness; seizures; shivering or shaking; sweating a lot; severe diarrhea, upset stomach, or throwing up; or very bad headache.  Some people  may have a higher chance of eye problems with this drug. Your doctor may want you to have an eye exam to see if you have a higher chance of these eye problems. Call your doctor right away if you have eye pain, change in eyesight, or swelling or redness in or around the eye.  This drug can cause low sodium levels. Very low sodium levels can be life-threatening, leading to seizures, passing out, trouble breathing, or death.  This drug may affect certain lab tests. Tell all of your health care providers and lab workers that you take this drug.  If you are 65 or older, use this drug with care. You could have more side effects.  This drug may affect growth in children and teens in some cases. They may need regular growth checks. Talk with the doctor.  Tell your doctor if you are pregnant, may be pregnant, or plan to get pregnant while taking this drug. You will need to talk about the benefits and risks to you and the baby. Taking this drug in the third trimester of pregnancy may raise your risk of bleeding after delivery and may lead to some health problems in the .  Tell your doctor if you are breast-feeding or plan to breast-feed. This drug passes into breast milk and may harm your baby.  What are some side effects that I need to call my doctor about right away?   WARNING/CAUTION: Even though it may be rare, some people may have very bad and sometimes deadly side effects when taking a drug. Tell your doctor or get medical help right away if you have any of the following signs or symptoms that may be related to a very bad side effect:  Signs of an allergic reaction, like rash; hives; itching; red, swollen, blistered, or peeling skin with or without fever; wheezing; tightness in the chest or throat; trouble breathing, swallowing, or talking; unusual hoarseness; or swelling of the mouth, face, lips, tongue, or throat.  Signs of low sodium levels like headache, trouble focusing, memory problems, feeling confused,  weakness, seizures, or change in balance.  Signs of bleeding like throwing up or coughing up blood; vomit that looks like coffee grounds; blood in the urine; black, red, or tarry stools; bleeding from the gums; abnormal vaginal bleeding; bruises without a cause or that get bigger; or bleeding you cannot stop.  Signs of high or low blood pressure like very bad headache or dizziness, passing out, or change in eyesight.  Seizures.  Trouble passing urine.  Sex problems have happened with drugs like this one. This includes lowered interest in sex, trouble having an orgasm, ejaculation problems, or trouble getting or keeping an erection. If you have any questions, talk with your doctor.  Liver problems have happened with this drug. Rarely, this has been deadly. Call your doctor right away if you have signs of liver problems like dark urine, tiredness, decreased appetite, upset stomach or stomach pain, light-colored stools, throwing up, or yellow skin or eyes.  A severe skin reaction (Lira-Juice syndrome/toxic epidermal necrolysis) may happen. It can cause severe health problems that may not go away, and sometimes death. Get medical help right away if you have signs like red, swollen, blistered, or peeling skin (with or without fever); red or irritated eyes; or sores in your mouth, throat, nose, or eyes.  What are some other side effects of this drug?   All drugs may cause side effects. However, many people have no side effects or only have minor side effects. Call your doctor or get medical help if any of these side effects or any other side effects bother you or do not go away:  Constipation, diarrhea, stomach pain, upset stomach, throwing up, or decreased appetite.  Headache.  Dry mouth.  Trouble sleeping.  Feeling dizzy, sleepy, tired, or weak.  Sweating a lot.  Weight loss.  Nose or throat irritation.  These are not all of the side effects that may occur. If you have questions about side effects, call your  doctor. Call your doctor for medical advice about side effects.  You may report side effects to your national health agency.  You may report side effects to the FDA at 1-615.241.2471. You may also report side effects at https://www.fda.gov/medwatch.  How is this drug best taken?   Use this drug as ordered by your doctor. Read all information given to you. Follow all instructions closely.  All products:   Keep taking this drug as you have been told by your doctor or other health care provider, even if you feel well.  Take with or without food.  Do not stop taking this drug all of a sudden without calling your doctor. You may have a greater risk of side effects. If you need to stop this drug, you will want to slowly stop it as ordered by your doctor.  Capsules:   Swallow whole. Do not chew, open, or crush.  Sprinkle capsules :  Swallow whole. Do not chew or crush.  If you cannot swallow this drug whole, you may sprinkle the contents on applesauce. If you do this, swallow the mixture right away without chewing.  Those who have feeding tubes may use this drug. Use as you have been told. Flush the feeding tube after this drug is given.  What do I do if I miss a dose?   Take a missed dose as soon as you think about it.  If it is close to the time for your next dose, skip the missed dose and go back to your normal time.  Do not take 2 doses at the same time or extra doses.  How do I store and/or throw out this drug?   Store at room temperature in a dry place. Do not store in a bathroom.  Keep all drugs in a safe place. Keep all drugs out of the reach of children and pets.  Throw away unused or  drugs. Do not flush down a toilet or pour down a drain unless you are told to do so. Check with your pharmacist if you have questions about the best way to throw out drugs. There may be drug take-back programs in your area.  General drug facts   If your symptoms or health problems do not get better or if they become worse,  call your doctor.  Do not share your drugs with others and do not take anyone else's drugs.  Some drugs may have another patient information leaflet. If you have any questions about this drug, please talk with your doctor, nurse, pharmacist, or other health care provider.  This drug comes with an extra patient fact sheet called a Medication Guide. Read it with care. Read it again each time this drug is refilled. If you have any questions about this drug, please talk with the doctor, pharmacist, or other health care provider.  If you think there has been an overdose, call your poison control center or get medical care right away. Be ready to tell or show what was taken, how much, and when it happened.  Consumer Information Use and Disclaimer   This generalized information is a limited summary of diagnosis, treatment, and/or medication information. It is not meant to be comprehensive and should be used as a tool to help the user understand and/or assess potential diagnostic and treatment options. It does NOT include all information about conditions, treatments, medications, side effects, or risks that may apply to a specific patient. It is not intended to be medical advice or a substitute for the medical advice, diagnosis, or treatment of a health care provider based on the health care provider's examination and assessment of a patient's specific and unique circumstances. Patients must speak with a health care provider for complete information about their health, medical questions, and treatment options, including any risks or benefits regarding use of medications. This information does not endorse any treatments or medications as safe, effective, or approved for treating a specific patient. UpToDate, Inc. and its affiliates disclaim any warranty or liability relating to this information or the use thereof. The use of this information is governed by the Terms of Use, available at  https://www.wolterskluwer.com/en/know/clinical-effectiveness-terms.  Last Reviewed Date   2023-09-05  Copyright   © 2024 UpToDate, Inc. and its affiliates and/or licensors. All rights reserved.

## 2025-02-26 NOTE — PROGRESS NOTES
VIRTUAL VISIT; PATIENT IDENTIFIED BY NAME AND DATE OF BIRTH; PHYSICAL EXAM NOT PERFORMED SECONDARY TO VIRTUAL VISIT; EXAM FINDINGS BELOW FROM PRIOR ENCOUNTER. A TOTAL OF 15 MINUTES VIA TELEPHONE WERE SPENT PREPARING TO SEE THE PATIENT, OBTAINING AND/OR REVIEWING SEPARATELY OBTAINED HISTORY, COUNSELING AND EDUCATING THE PATIENT/FAMILY/CAREGIVER, ORDERING MEDICATIONS, TESTS OR SCHEDULING PROCEDURES, REFERRING AND COMMUNICATING WITH OTHER HEALTH CARE PROFESSIONALS, DOCUMENTING CLINICAL INFORMATION IN THE ELECTRONIC OR OTHER HEALTH RECORD AND CARE COORDINATION.      Assessment  1. Thoracic radiculitis  -     DULoxetine (CYMBALTA) 30 mg delayed release capsule; Take 1 capsule (30 mg total) by mouth daily  2. Primary osteoarthritis of left shoulder  -     DULoxetine (CYMBALTA) 30 mg delayed release capsule; Take 1 capsule (30 mg total) by mouth daily    Greater than 70% relief of pain with improved ability to participate with IADLs after T11-T12 ILESI for mid back pain with left shoulder US guided injection; prior procedures provided relief for more than 6 months. Mri was reviewed showing small right subarticular disc protrusion at levels T11-12 and T12-L1 mildly indenting right ventral thecal sac without significant canal stenosis which was contributory. Additionally she describes left shoulder pain with overhead maneuvers, pain with lying on that side. Ttp over left sits muscles, normal ROM, no significant pain at present with abduction/external rotation; xray noncontributory. Greater than 80% relief of pain with improved ability to participate with IADLs taking cymbalta 30mg/day without side effects. Will take medication roughly 4-5 times per week to manage serotonergic effects. Previously reported the following symptomatology:     Axial mid back pain described primarily by arthritic features with radicular pain in T7-T11 dermatome.  Aching, nagging, indolent, stabbing, throbbing features in axial mid back with  radicular components.  5/5 strength bilaterally in upper and lower extremities, negative SLR.  Positive facet loading maneuvers in thoracic spine elicited pain, positive tenderness to palpation over thoracic paraspinal muscles more right sided.  xray thoracic spine noncontributory;however, considerable spondylosis noted in lumbar spine.  Currently she is neurologically intact without gait instability, saddle anesthesia or bowel/bladder abnormality. Risks, benefits and alternatives to KRYS, medial branch blocks and subsequent radiofrequency ablation if successful thoroughly discussed with patient.  Handouts provided questions answered to patient satisfaction.    The patient has been experiencing moderate to severe axial spine pain that is causing functional deficit.  The pain has been present for at least 3 months and is not improving with conservative care.  Currently the patient is not experiencing any radicular features nor neurogenic claudication.  Non-facet pathology has been ruled out on clinical evaluation.      Plan  -f/u  1 year or prn  -voltaren gel rx to be applied to left shoulder 4x daily prn pain  -cymbalta 30mg/day ordered; counseled regarding sedative effects of taking this medication and provided up titration calendar.  Counseled not to take medication while driving or operating heavy machinery/using stairs  -has completed formal physical therapy for right-sided mid back pain/thoracic spondylosis and muscle spasm; Physician directed home exercise plan as per [facility] demonstrated and handouts provided that patient plans to participate with for 1 hour, twice a week for the next 6 weeks.     There are risks associated with opioid medications, including dependence, addiction and tolerance. The patient understands and agrees to use these medications only as prescribed. Potential side effects of the medications include, but are not limited to, constipation, drowsiness, addiction, impaired judgment and risk of  fatal overdose if not taken as prescribed. The patient was warned against driving while taking sedation medications or operating heavy machinery. The patient voiced understanding. Sharing medications is a felony. At this point in time, the patient is showing no signs of addiction, abuse, diversion or suicidal ideation.     [place] Prescription Drug Monitoring Program report was reviewed and was appropriate      Complete risks and benefits including bleeding, infection, tissue reaction, nerve injury and allergic reaction were discussed. The approach was demonstrated using models and literature was provided. Verbal and written consent was obtained.     My impressions and treatment recommendations were discussed in detail with the patient who verbalized understanding and had no further questions.  Discharge instructions were provided. I personally saw and examined the patient and I agree with the above discussed plan of care.      No orders of the defined types were placed in this encounter.      History of Present Illness    Jennifer Carrillo is a 69 y.o. female with pmhx of prediabetes presenting with chronic mid back pain described primarily as arthritic in nature. She describes 8/10 ,od back pain that is worse in the mornings and worse at the end of the day.  The pain is characterized by achy, nagging, indolent, crampy, stabbing pain in her axial mid back.  The patient describes that the pain is worse with standing for long periods of time on hard surfaces as well as with walking.  The patient is a very active individual and feels as though this pain compromises his participation with independent activities of daily living. The pain can be debilitating at times and contribute to significant disability, compromising overall activity and independent activities of daily living.  She has not yet trialed PT formally.  Medications the patient has tried in the past include nsaids, tylenol. She describes b/l T7-T11  radicular symptoms and has good strength.  She denies any weakness numbness or paresthesias. The patient denies any bowel or bladder dysfunction, saddle anesthesia or gait instability as well.      I have personally reviewed and/or updated the patient's past medical history, past surgical history, family history, social history, current medications, allergies, and vital signs today.     Review of Systems   Constitutional:  Positive for activity change.   HENT: Negative.     Eyes: Negative.    Respiratory: Negative.     Cardiovascular: Negative.    Gastrointestinal: Negative.    Endocrine: Negative.    Genitourinary: Negative.    Musculoskeletal:  Positive for arthralgias, back pain and myalgias. Negative for gait problem.   Skin: Negative.    Allergic/Immunologic: Negative.    Neurological:  Negative for weakness and numbness.   Hematological: Negative.    Psychiatric/Behavioral: Negative.     All other systems reviewed and are negative.      Patient Active Problem List   Diagnosis    Sphincter of Oddi dysfunction    Arthritis    Elevated hemoglobin A1c    Hepatic steatosis    Nasal congestion    Chest congestion    Tracheitis    Thoracic radiculitis    Primary osteoarthritis of left shoulder       Past Medical History:   Diagnosis Date    Allergic Several years ago    Arthritis Several years ago    Bronchitis     Diabetes mellitus (HCC)     Elevated hemoglobin A1c     Pt states she does not take medication but is trying to lower with diet and exercise ( 7.1)    Hypertension     Kidney stone Several years ago    Lipoma of arm     LUE    Obesity Several years ago    Pneumonia     Sphincter of Oddi dysfunction     Visual impairment        Past Surgical History:   Procedure Laterality Date    BLADDER SUSPENSION      BREAST EXCISIONAL BIOPSY Left 2000    benign    BREAST SURGERY      CHOLECYSTECTOMY      COLONOSCOPY      HYSTERECTOMY  1980    IR SPINE AND PAIN PROCEDURE  04/16/2024    IR SPINE AND PAIN PROCEDURE   2024    OOPHORECTOMY Bilateral     MA EXC B9 LESION MRGN XCP SK TG T/A/L 3.1-4.0 CM Left 2022    Procedure: UPPER ARM MASS EXCISION;  Surgeon: Krys Mendiola DO;  Location: [facility];  Service: General    RECTAL PROLAPSE REPAIR         Family History   Problem Relation Age of Onset    No Known Problems Mother     Diabetes Father     No Known Problems Sister     No Known Problems Daughter     Leukemia Maternal Grandmother     No Known Problems Maternal Grandfather     No Known Problems Paternal Grandmother     No Known Problems Paternal Grandfather     No Known Problems Maternal Aunt     No Known Problems Maternal Aunt     No Known Problems Paternal Aunt     No Known Problems Paternal Aunt     No Known Problems Sister     No Known Problems Daughter     BRCA2 Positive Neg Hx     BRCA2 Negative Neg Hx     BRCA1 Positive Neg Hx     BRCA1 Negative Neg Hx     BRCA 1/2 Neg Hx     Ovarian cancer Neg Hx     Endometrial cancer Neg Hx     Colon cancer Neg Hx     Breast cancer additional onset Neg Hx     Breast cancer Neg Hx        Social History     Occupational History    Not on file   Tobacco Use    Smoking status: Former     Current packs/day: 0.00     Average packs/day: 0.5 packs/day for 21.4 years (10.7 ttl pk-yrs)     Types: Cigarettes     Start date: 1972     Quit date: 1993     Years since quittin.7     Passive exposure: Never    Smokeless tobacco: Never   Vaping Use    Vaping status: Never Used   Substance and Sexual Activity    Alcohol use: Never    Drug use: Never    Sexual activity: Not Currently     Partners: Male     Birth control/protection: None       Current Outpatient Medications on File Prior to Visit   Medication Sig    amLODIPine (NORVASC) 10 mg tablet Take 1 tablet (10 mg total) by mouth daily    DULoxetine (CYMBALTA) 30 mg delayed release capsule TAKE 1 CAPSULE BY MOUTH EVERY DAY    Semaglutide-Weight Management (Wegovy) 2.4 MG/0.75ML Inject 0.75 mL (2.4 mg total) under  "the skin once a week    Sennosides (Laxative Pills) 25 MG TABS Take 2 tablets by mouth daily at bedtime     No current facility-administered medications on file prior to visit.       Allergies   Allergen Reactions    Cayenne - Food Allergy Shortness Of Breath     Pt states she coughs and then can't take breaths in    Pineapple - Food Allergy Shortness Of Breath     Pt states she has a terrible cough and can't breath in    Demerol [Meperidine] Itching    Oxycodone Itching    Propoxyphene Itching    Tegaderm Alginate Ag Rope Rash and Swelling         Physical Exam (findings from prior encounter given virtual visit)    Ht 5' 2\" (1.575 m)   Wt 64.9 kg (143 lb)   BMI 26.16 kg/m²     Constitutional: normal, well developed, well nourished, alert, in no distress and non-toxic and no overt pain behavior. and overweight  Eyes: anicteric  HEENT: grossly intact  Neck: supple, symmetric, trachea midline and no masses   Pulmonary:even and unlabored  Cardiovascular:No edema or pitting edema present  Skin:Normal without rashes or lesions and well hydrated  Psychiatric:Mood and affect appropriate  Neurologic:Cranial Nerves II-XII grossly intact Sensation grossly intact; no clonus negative romero's. Reflexes 2+ and brisk. SLR negative bilaterally. Spurling's maneuver negative bilaterally.  Musculoskeletal:normal gait. 5/5 strength bilaterally with AROM in all extremities. Normal heel toe and tip toe walking. Significant pain with thoracic facet loading bilaterally and with lateral spine rotation, right greater than left. ttp over right sided thoracic paraspinal muscles. Negative bhavin's test, negative gaenslen's negative SIJ loading bilaterally.    Imaging    MRI THORACIC SPINE WITHOUT CONTRAST     INDICATION: M47.814: Spondylosis without myelopathy or radiculopathy, thoracic region.     COMPARISON: X-ray thoracic spine 5/9/2022     TECHNIQUE:  Multiplanar, multisequence imaging of the thoracic spine was performed. .     IMAGE " QUALITY: Diagnostic.     FINDINGS:     ALIGNMENT: Alignment is unremarkable.     MARROW SIGNAL: No bone marrow signal abnormality or suspicious discrete lesion.     THORACIC CORD: Normal signal within the thoracic cord.     PARAVERTEBRAL SOFT TISSUES:  Normal.     THORACIC DEGENERATIVE CHANGE:     Small right subarticular disc protrusion at levels T11-12 and T12-L1 mildly indenting right ventral thecal sac without significant canal stenosis.  Minimal disc bulge at the levels T7 through T11 with tiny disc protrusions (left subarticular T7-8 and right subarticular T8-9). There is minor canal narrowing at these levels.     Incompletely evaluated (partially imaged) degenerative narrowing in the mid to low cervical spine.     OTHER FINDINGS: Right renal cysts.     IMPRESSION:     Mild degenerative change in the mid to lower thoracic spine as described. No high-grade canal or foraminal stenosis.

## 2025-03-13 RX ORDER — SEMAGLUTIDE 2.4 MG/.75ML
2.4 INJECTION, SOLUTION SUBCUTANEOUS WEEKLY
Qty: 3 ML | Refills: 1 | Status: SHIPPED | OUTPATIENT
Start: 2025-03-13

## 2025-03-28 DIAGNOSIS — M67.431 GANGLION OF RIGHT WRIST: Primary | ICD-10-CM

## 2025-04-01 ENCOUNTER — NURSE TRIAGE (OUTPATIENT)
Age: 70
End: 2025-04-01

## 2025-04-01 ENCOUNTER — OFFICE VISIT (OUTPATIENT)
Dept: FAMILY MEDICINE CLINIC | Facility: CLINIC | Age: 70
End: 2025-04-01
Payer: COMMERCIAL

## 2025-04-01 VITALS
OXYGEN SATURATION: 98 % | HEART RATE: 84 BPM | HEIGHT: 62 IN | DIASTOLIC BLOOD PRESSURE: 68 MMHG | SYSTOLIC BLOOD PRESSURE: 118 MMHG | WEIGHT: 141 LBS | BODY MASS INDEX: 25.95 KG/M2

## 2025-04-01 DIAGNOSIS — K60.2 ANAL FISSURE: Primary | ICD-10-CM

## 2025-04-01 PROCEDURE — 99213 OFFICE O/P EST LOW 20 MIN: CPT | Performed by: NURSE PRACTITIONER

## 2025-04-01 NOTE — TELEPHONE ENCOUNTER
"Patient reports that she saw blood on the toilet paper twice today after a bowel movement. Did not look in the toilet to see if it was mixed in with stool or how much was there. Had a fissure years ago. OV scheduled for today.     Reason for Disposition   MODERATE rectal bleeding (small blood clots, passing blood without stool, or toilet water turns red)    Answer Assessment - Initial Assessment Questions  1. APPEARANCE of BLOOD: \"What color is it?\" \"Is it passed separately, on the surface of the stool, or mixed in with the stool?\"       Bright red  2. AMOUNT: \"How much blood was passed?\"       moderate  3. FREQUENCY: \"How many times has blood been passed with the stools?\"       Twice today  4. ONSET: \"When was the blood first seen in the stools?\" (Days or weeks)       today  5. DIARRHEA: \"Is there also some diarrhea?\" If Yes, ask: \"How many diarrhea stools in the past 24 hours?\"       Normal stool  6. CONSTIPATION: \"Do you have constipation?\" If Yes, ask: \"How bad is it?\"      no  7. RECURRENT SYMPTOMS: \"Have you had blood in your stools before?\" If Yes, ask: \"When was the last time?\" and \"What happened that time?\"       Years ago, had a fissure  8. BLOOD THINNERS: \"Do you take any blood thinners?\" (e.g., Coumadin/warfarin, Pradaxa/dabigatran, aspirin)      no  9. OTHER SYMPTOMS: \"Do you have any other symptoms?\"  (e.g., abdomen pain, vomiting, dizziness, fever)      no    Protocols used: Rectal Bleeding-Adult-[place]    "

## 2025-04-01 NOTE — PROGRESS NOTES
"Name: Jennifer Carrillo      : 1955      MRN: [id number]  Encounter Provider: MARK Chatman  Encounter Date: 2025   Encounter department: formerly [facility]IMARY [facility]  :  Assessment & Plan  Anal fissure  Rectal exam negative for hemorrhoids, pain with minor insertion  - suspect fissure - will treat.   Orders:  •  hydrocortisone-pramoxine (PROCTOFOAM-HC) 1-1 % FOAM rectal foam; Insert 1 applicator into the rectum 2 (two) times a day for 14 days           History of Present Illness   Here due to bleeding with bowel movements x 3 - noted when wiping, did not look in toilet to see if there was more blood.    Hx of an anal fissure but was less bloody.        Review of Systems   Constitutional: Negative.    Respiratory: Negative.     Gastrointestinal:  Positive for anal bleeding.       Objective   /68 (BP Location: Left arm, Patient Position: Sitting, Cuff Size: Large)   Pulse 84   Ht 5' 2\" (1.575 m)   Wt 64 kg (141 lb)   SpO2 98%   BMI 25.79 kg/m²      Physical Exam  Genitourinary:      Neurological:      Mental Status: She is alert and oriented to person, place, and time.           "

## 2025-04-03 ENCOUNTER — HOSPITAL ENCOUNTER (OUTPATIENT)
Dept: RADIOLOGY | Facility: CLINIC | Age: 70
End: 2025-04-03
Payer: COMMERCIAL

## 2025-04-03 ENCOUNTER — OFFICE VISIT (OUTPATIENT)
Dept: OBGYN CLINIC | Facility: CLINIC | Age: 70
End: 2025-04-03
Payer: COMMERCIAL

## 2025-04-03 DIAGNOSIS — M25.531 PAIN IN RIGHT WRIST: ICD-10-CM

## 2025-04-03 DIAGNOSIS — M71.339 SYNOVIAL CYST OF WRIST: Primary | ICD-10-CM

## 2025-04-03 PROCEDURE — 73110 X-RAY EXAM OF WRIST: CPT

## 2025-04-03 PROCEDURE — 99203 OFFICE O/P NEW LOW 30 MIN: CPT | Performed by: STUDENT IN AN ORGANIZED HEALTH CARE EDUCATION/TRAINING PROGRAM

## 2025-04-03 NOTE — PROGRESS NOTES
ASSESSMENT/PLAN:    Assessment & Plan  Synovial cyst of wrist    Orders:    Ambulatory Referral to Orthopedic Surgery    MRI wrist right w contrast; Future    Pain in right wrist    Orders:    XR wrist 3+ vw right; Future         1. Synovial cyst of wrist  Ambulatory Referral to Orthopedic Surgery    MRI wrist right w contrast      2. Pain in right wrist  XR wrist 3+ vw right          69 y.o. female presents with signs and symptoms consistent with a ganglion cyst of the wrist.  The cyst itself originates off the volar ulnar aspect of the wrist about the DRUJ.  We discussed that it does transilluminate and is therefore consistent with a ganglion cyst.  However it is slightly atypical and the location as well as the size.  For those reasons I did discuss with the patient that I think an MRI would be pertinent.    I discussed the etiology and natural course of a ganglion with the patient.  We discussed that these originate from joints or tendons and often have a stalk that comes out from the structure that axis of valve to move fluid from either the joint or the tendon into a balloon like structure.  The fluid can move into the ganglion and allowed to grow, or recede.  This is often based on activity and inflammation.  Treatment of this condition was discussed as well.  Nonsurgical management involves watchful waiting as many of these will resolve with rest, bracing, and anti-inflammatory medications, or an aspiration and injection with corticosteroid.  Aspiration and injection, specifically of dorsal wrist ganglions, typically leads to alleviation of the ganglion in about 50% of patients.  Surgical excision was also discussed and this comes with an 80% success rate for volar wrist ganglions and 92% success rate with dorsal wrist ganglions in terms of prevention of recurrence.  The patient expressed that she would like to proceed with surgical excision.  I did discuss with her that I would like to complete the MRI  prior note only to confirm the diagnosis but also to evaluate for the location of the cyst relative to the surrounding neurovascular structures.    she expressed understanding of the plan and agreed. We encouraged them to contact our office with any questions or concerns.     Plan will be to obtain an MRI with and without contrast  Patient will follow-up after the MRI to discuss      _____________________________________________________  CHIEF COMPLAINT:  Right wrist pain and mass    Referred By:  Briana Chatman  [address] [place] [address]    SUBJECTIVE:  Jennifer Carrillo is a 69 y.o. right hand dominant female presenting for evaluation of right wrist pain. Notes the mass has been there for a few months and is starting to get bigger. Here to establish care. Denies numbness or tingling. Denies locking, clicking, popping. She is able to use her hands, but it is uncomfortable.  The discomfort is primarily in the ulnar wrist and is worse with twisting exercises.  No trauma. No fever or chills. No unexplained weightloss.  No night sweats.  No history of cancer.    ADLs: Community ambulator  Smoke: Former   ETOH: No   Drugs:  No  Job: Retired       Occupation/hobbies: [employer]      PAST MEDICAL HISTORY:  Past Medical History:   Diagnosis Date    Allergic Several years ago    Arthritis Several years ago    Bronchitis     Diabetes mellitus (HCC)     Elevated hemoglobin A1c     Pt states she does not take medication but is trying to lower with diet and exercise ( 7.1)    Hypertension     Kidney stone Several years ago    Lipoma of arm     LUE    Obesity Several years ago    Pneumonia     Sphincter of Oddi dysfunction     Visual impairment        PAST SURGICAL HISTORY:  Past Surgical History:   Procedure Laterality Date    BLADDER SUSPENSION      BREAST EXCISIONAL BIOPSY Left 2000    benign    BREAST SURGERY      CHOLECYSTECTOMY      COLONOSCOPY      HYSTERECTOMY  1980    IR SPINE AND PAIN PROCEDURE  04/16/2024     IR SPINE AND PAIN PROCEDURE  2024    OOPHORECTOMY Bilateral     DE EXC B9 LESION MRGN XCP SK TG T/A/L 3.1-4.0 CM Left 2022    Procedure: UPPER ARM MASS EXCISION;  Surgeon: Krys Mendiola DO;  Location: [facility];  Service: General    RECTAL PROLAPSE REPAIR         FAMILY HISTORY:  Family History   Problem Relation Age of Onset    No Known Problems Mother     Diabetes Father     No Known Problems Sister     No Known Problems Daughter     Leukemia Maternal Grandmother     No Known Problems Maternal Grandfather     No Known Problems Paternal Grandmother     No Known Problems Paternal Grandfather     No Known Problems Maternal Aunt     No Known Problems Maternal Aunt     No Known Problems Paternal Aunt     No Known Problems Paternal Aunt     No Known Problems Sister     No Known Problems Daughter     BRCA2 Positive Neg Hx     BRCA2 Negative Neg Hx     BRCA1 Positive Neg Hx     BRCA1 Negative Neg Hx     BRCA 1/2 Neg Hx     Ovarian cancer Neg Hx     Endometrial cancer Neg Hx     Colon cancer Neg Hx     Breast cancer additional onset Neg Hx     Breast cancer Neg Hx        SOCIAL HISTORY:  Social History     Tobacco Use    Smoking status: Former     Current packs/day: 0.00     Average packs/day: 0.5 packs/day for 21.4 years (10.7 ttl pk-yrs)     Types: Cigarettes     Start date: 1972     Quit date: 1993     Years since quittin.8     Passive exposure: Never    Smokeless tobacco: Never   Vaping Use    Vaping status: Never Used   Substance Use Topics    Alcohol use: Never    Drug use: Never       MEDICATIONS:    Current Outpatient Medications:     amLODIPine (NORVASC) 10 mg tablet, Take 1 tablet (10 mg total) by mouth daily, Disp: 90 tablet, Rfl: 2    DULoxetine (CYMBALTA) 30 mg delayed release capsule, Take 1 capsule (30 mg total) by mouth daily, Disp: 90 capsule, Rfl: 3    hydrocortisone-pramoxine (PROCTOFOAM-HC) 1-1 % FOAM rectal foam, Insert 1 applicator into the rectum 2 (two) times a  day for 14 days, Disp: 10 g, Rfl: 2    Semaglutide-Weight Management (Wegovy) 2.4 MG/0.75ML, INJECT 0.75 ML (2.4 MG TOTAL) UNDER THE SKIN ONCE A WEEK, Disp: 3 mL, Rfl: 1    Sennosides (Laxative Pills) 25 MG TABS, Take 2 tablets by mouth daily at bedtime, Disp: , Rfl:     ALLERGIES:  Allergies   Allergen Reactions    Cayenne - Food Allergy Shortness Of Breath     Pt states she coughs and then can't take breaths in    Pineapple - Food Allergy Shortness Of Breath     Pt states she has a terrible cough and can't breath in    Demerol [Meperidine] Itching    Oxycodone Itching    Propoxyphene Itching    Tegaderm Alginate Ag Rope Rash and Swelling       REVIEW OF SYSTEMS:  Pertinent items are noted in HPI.  A comprehensive review of systems was negative.    LABS:  HgA1c:   Lab Results   Component Value Date    HGBA1C 4.9 02/24/2025     BMP:   Lab Results   Component Value Date    CALCIUM 9.5 02/24/2025    K 4.1 02/24/2025    CO2 27 02/24/2025     02/24/2025    BUN 18 02/24/2025    CREATININE 0.95 02/24/2025         _____________________________________________________  PHYSICAL EXAMINATION:  There were no vitals taken for this visit.    General: Well developed and well nourished, alert & oriented x 3, appears comfortable   Psychiatric: Normal   HEENT: Normocephalic, Atraumatic Trachea Midline, No torticollis   Pulmonary: No audible wheezing or respiratory distress   Abdomen/GI: Non tender, non distended   Cardiovascular: No pitting edema, 2+ radial pulse       MUSCULOSKELETAL EXAMINATION:  Right Wrist   Skin intact  No soft tissue swelling  2cm by 1cm round semi-firm semi-mobile mass originating from volar aspect of distal ulna and just deep to the FCU tendon going up to the level of the distal ulna head. Transillumination positive  ECU stable with no subluxation  DRUJ stable in pronation, neutral, and supination  Range of Motion:  Elbow: extension/flexion 0/140  Forearm: pronation/supination 80/80  Wrist:  extension/flexion 70/70  Digit: full AROM in DIP, PIP, MP joints  Neurovascular:  Motor Exam: firing AIN/PIN/U.   Sensory Exam: Sensation intact to light touch in FDWS (radial), volar IF (median), volar SF (ulnar)  Vascular Exam: < 2 sec capillary refill     _____________________________________________________  STUDIES REVIEWED:  Radiographs: I personally reviewed and independently interpreted the available radiographs.  4/3/2025: Radiographs of the right wrist, multiple views, demonstrate no evidence of fracture or dislocation.       Bhaskar Schneider MD  Hand and Upper Extremity Surgery        *This note was dictated using Dragon voice recognition software. Please excuse any word substitutions or errors.*       Scribe Attestation      I,:  Molly Leias am acting as a scribe while in the presence of the attending physician.:       I,:  Bhaskar Schneider MD personally performed the services described in this documentation    as scribed in my presence.:

## 2025-05-05 RX ORDER — SEMAGLUTIDE 2.4 MG/.75ML
2.4 INJECTION, SOLUTION SUBCUTANEOUS WEEKLY
Qty: 2 ML | Refills: 2 | Status: SHIPPED | OUTPATIENT
Start: 2025-05-05

## 2025-05-05 RX ORDER — SEMAGLUTIDE 2.4 MG/.75ML
2.4 INJECTION, SOLUTION SUBCUTANEOUS WEEKLY
Qty: 3 ML | Refills: 0 | Status: SHIPPED | OUTPATIENT
Start: 2025-05-05

## 2025-05-07 ENCOUNTER — HOSPITAL ENCOUNTER (OUTPATIENT)
Dept: MRI IMAGING | Facility: HOSPITAL | Age: 70
Discharge: HOME | End: 2025-05-07
Attending: STUDENT IN AN ORGANIZED HEALTH CARE EDUCATION/TRAINING PROGRAM
Payer: COMMERCIAL

## 2025-05-07 DIAGNOSIS — M71.339 SYNOVIAL CYST OF WRIST: ICD-10-CM

## 2025-05-07 PROCEDURE — 73223 MRI JOINT UPR EXTR W/O&W/DYE: CPT

## 2025-05-07 PROCEDURE — A9585 GADOBUTROL INJECTION: HCPCS | Performed by: NURSE PRACTITIONER

## 2025-05-07 RX ORDER — GADOBUTROL 604.72 MG/ML
6 INJECTION INTRAVENOUS
Status: COMPLETED | OUTPATIENT
Start: 2025-05-07 | End: 2025-05-07

## 2025-05-07 RX ADMIN — GADOBUTROL 6 ML: 604.72 INJECTION INTRAVENOUS at 09:54

## 2025-05-15 ENCOUNTER — OFFICE VISIT (OUTPATIENT)
Dept: OBGYN CLINIC | Facility: CLINIC | Age: 70
End: 2025-05-15
Payer: COMMERCIAL

## 2025-05-15 ENCOUNTER — PREP FOR PROCEDURE (OUTPATIENT)
Dept: OBGYN CLINIC | Facility: CLINIC | Age: 70
End: 2025-05-15

## 2025-05-15 ENCOUNTER — APPOINTMENT (OUTPATIENT)
Dept: LAB | Facility: HOSPITAL | Age: 70
End: 2025-05-15
Payer: COMMERCIAL

## 2025-05-15 ENCOUNTER — OFFICE VISIT (OUTPATIENT)
Dept: LAB | Facility: HOSPITAL | Age: 70
End: 2025-05-15
Attending: STUDENT IN AN ORGANIZED HEALTH CARE EDUCATION/TRAINING PROGRAM
Payer: COMMERCIAL

## 2025-05-15 VITALS — HEIGHT: 62 IN | BODY MASS INDEX: 25.06 KG/M2 | WEIGHT: 136.2 LBS

## 2025-05-15 DIAGNOSIS — M71.339 SYNOVIAL CYST OF WRIST: ICD-10-CM

## 2025-05-15 DIAGNOSIS — M71.339 SYNOVIAL CYST OF WRIST: Primary | ICD-10-CM

## 2025-05-15 LAB
ATRIAL RATE: 74 BPM
BASOPHILS # BLD AUTO: 0.04 THOUSANDS/ÂΜL (ref 0–0.1)
BASOPHILS NFR BLD AUTO: 1 % (ref 0–1)
EOSINOPHIL # BLD AUTO: 0.12 THOUSAND/ÂΜL (ref 0–0.61)
EOSINOPHIL NFR BLD AUTO: 2 % (ref 0–6)
ERYTHROCYTE [DISTWIDTH] IN BLOOD BY AUTOMATED COUNT: 12.6 % (ref 11.6–15.1)
HCT VFR BLD AUTO: 46 % (ref 34.8–46.1)
HGB BLD-MCNC: 15.5 G/DL (ref 11.5–15.4)
IMM GRANULOCYTES # BLD AUTO: 0.01 THOUSAND/UL (ref 0–0.2)
IMM GRANULOCYTES NFR BLD AUTO: 0 % (ref 0–2)
LYMPHOCYTES # BLD AUTO: 2.86 THOUSANDS/ÂΜL (ref 0.6–4.47)
LYMPHOCYTES NFR BLD AUTO: 39 % (ref 14–44)
MCH RBC QN AUTO: 29.7 PG (ref 26.8–34.3)
MCHC RBC AUTO-ENTMCNC: 33.7 G/DL (ref 31.4–37.4)
MCV RBC AUTO: 88 FL (ref 82–98)
MONOCYTES # BLD AUTO: 0.56 THOUSAND/ÂΜL (ref 0.17–1.22)
MONOCYTES NFR BLD AUTO: 8 % (ref 4–12)
NEUTROPHILS # BLD AUTO: 3.79 THOUSANDS/ÂΜL (ref 1.85–7.62)
NEUTS SEG NFR BLD AUTO: 50 % (ref 43–75)
NRBC BLD AUTO-RTO: 0 /100 WBCS
P AXIS: 30 DEGREES
PLATELET # BLD AUTO: 225 THOUSANDS/UL (ref 149–390)
PMV BLD AUTO: 10.3 FL (ref 8.9–12.7)
PR INTERVAL: 168 MS
QRS AXIS: -15 DEGREES
QRSD INTERVAL: 68 MS
QT INTERVAL: 382 MS
QTC INTERVAL: 424 MS
RBC # BLD AUTO: 5.22 MILLION/UL (ref 3.81–5.12)
T WAVE AXIS: 29 DEGREES
VENTRICULAR RATE: 74 BPM
WBC # BLD AUTO: 7.38 THOUSAND/UL (ref 4.31–10.16)

## 2025-05-15 PROCEDURE — 99214 OFFICE O/P EST MOD 30 MIN: CPT | Performed by: STUDENT IN AN ORGANIZED HEALTH CARE EDUCATION/TRAINING PROGRAM

## 2025-05-15 PROCEDURE — 93005 ELECTROCARDIOGRAM TRACING: CPT

## 2025-05-15 PROCEDURE — 36415 COLL VENOUS BLD VENIPUNCTURE: CPT

## 2025-05-15 PROCEDURE — 85025 COMPLETE CBC W/AUTO DIFF WBC: CPT

## 2025-05-15 RX ORDER — CHLORHEXIDINE GLUCONATE ORAL RINSE 1.2 MG/ML
15 SOLUTION DENTAL ONCE
OUTPATIENT
Start: 2025-05-15 | End: 2025-05-15

## 2025-05-15 RX ORDER — SODIUM CHLORIDE, SODIUM LACTATE, POTASSIUM CHLORIDE, CALCIUM CHLORIDE 600; 310; 30; 20 MG/100ML; MG/100ML; MG/100ML; MG/100ML
20 INJECTION, SOLUTION INTRAVENOUS CONTINUOUS
OUTPATIENT
Start: 2025-05-15

## 2025-05-15 NOTE — PROGRESS NOTES
Orthopedic Surgery Management Note  Jennifer Carrillo (69 y.o. female)  : 1955 Encounter Date: 5/15/2025      Assessment and Plan:    Assessment & Plan  Synovial cyst of wrist  69 y.o. female presenting with a synovial cyst arising from the ulnar wrist.  I had seen the patient previously for this and she was symptomatic at that time.  The pain is primarily with bumping because of the size of the cyst.  She does note when she hits it it is quite tender.  She also admits that she is bothered by the cosmesis of the cyst but expressed to me that she would like the cyst removed for more than just cosmetic reasons and does feel that it is functionally limiting her because of the pain.  When I previously saw her I did think it was a cyst but given its size and somewhat atypical location I referred her for an MRI which has confirm the diagnosis.  Importantly and else also confirm that there is no underlying pathology such as a TFCC tear.    I then again today revisited treatment options for the ganglion cyst.  The patient I had already had this discussion but I did discuss that for majority of ganglion cyst they are benign and I typically recommend observation.  For cyst that are symptomatic I will consider cyst aspiration versus surgical excision.  Cyst aspiration can result in a 50% recurrence rate.  Surgical excision results in a 5 to 10% recurrence rate but has the complications of surgery and I like to phrase to patients that you are treating the bump for a scar in the area.  That is why stress I do not like to perform the excision for simple cosmetic reasons but it should be because of the cyst is functionally limiting the patient in someway. Common risks include bleeding, infection, injury to nearby structures (vessels, nerves, tendons, ligaments), blood clots (deep vein thrombosis / pulmonary embolus), and wound healing problems. Infection may result in an infection of the blood stream and/or the need for oral  or intravenous antibiotics. Additional risks include impaired limb function, loss of limb and/or failure to achieve desired results. Nerve injury can result in numbness, incomplete or worsening nerve recovery.  Her cyst is about the ulnar wrist and so I discussed that the ulnar neurovascular bundle could be endangered in that area and there is the ulnar sensory nerve that runs dorsal in the area and if damage could result in a dorsal hand numbness.  Therefore it is not to be taken lightly to perform surgery for removal of the cyst.    After this full discussion the patient expressed to me that she still would like surgical excision of the cyst.  She understands there is a chance for recurrence and understands that she may still have pain on that side of the wrist even after excision.  She expressed understanding of all risks.    All questions were answered to the patient's satisfaction.  The patient agrees to comply with a standard postoperative protocol, and is willing to proceed.  There were no barriers to learning. Prior to surgery, the patient may be requested to stop all anti-inflammatory medications.  Prophylactic aspirin, Plavix, and Coumadin may be allowed to be continued.  Medications including vitamin E., ginkgo, and fish oil are requested to be stopped approximately one week prior to surgery.     she expressed understanding of the plan and agreed. We encouraged them to contact our office with any questions or concerns.     Plan will be for surgical excision of the cyst  Patient will follow-up 14 to 16 days after for reevaluation           Chief Complaint:     Right wrist pain and mass    Previous History:   The patient was seen on 4/3/2025 complaining of a mass has been there for a few months and is starting to get bigger. She is able to use her hands, but it is uncomfortable.  The discomfort is primarily in the ulnar wrist and is worse with twisting exercises.  No trauma.  It was recommended she obtain  and MRI    Interval History:  Today, she is here to review her MRI.  She expressed that the cyst is still quite symptomatic for her.  She does have pain around that area primarily when she bumps the cyst on things.  She feels it is quite large and gets in the way.  She would like the cyst removed surgically.     Past Medical History:  Past Medical History:   Diagnosis Date    Allergic Several years ago    Arthritis Several years ago    Bronchitis     Diabetes mellitus (HCC)     Elevated hemoglobin A1c     Pt states she does not take medication but is trying to lower with diet and exercise ( 7.1)    Hypertension     Kidney stone Several years ago    Lipoma of arm     LUE    Obesity Several years ago    Pneumonia     Sphincter of Oddi dysfunction     Visual impairment      Past Surgical History:   Procedure Laterality Date    BLADDER SUSPENSION      BREAST EXCISIONAL BIOPSY Left 2000    benign    BREAST SURGERY      CHOLECYSTECTOMY      COLONOSCOPY      HYSTERECTOMY  1980    IR SPINE AND PAIN PROCEDURE  04/16/2024    IR SPINE AND PAIN PROCEDURE  11/05/2024    OOPHORECTOMY Bilateral 1980    FL EXC B9 LESION MRGN XCP SK TG T/A/L 3.1-4.0 CM Left 06/03/2022    Procedure: UPPER ARM MASS EXCISION;  Surgeon: Krys Mendiola DO;  Location:  MAIN OR;  Service: General    RECTAL PROLAPSE REPAIR       Family History   Problem Relation Age of Onset    No Known Problems Mother     Diabetes Father     No Known Problems Sister     No Known Problems Daughter     Leukemia Maternal Grandmother     No Known Problems Maternal Grandfather     No Known Problems Paternal Grandmother     No Known Problems Paternal Grandfather     No Known Problems Maternal Aunt     No Known Problems Maternal Aunt     No Known Problems Paternal Aunt     No Known Problems Paternal Aunt     No Known Problems Sister     No Known Problems Daughter     BRCA2 Positive Neg Hx     BRCA2 Negative Neg Hx     BRCA1 Positive Neg Hx     BRCA1 Negative Neg Hx     BRCA  " Neg Hx     Ovarian cancer Neg Hx     Endometrial cancer Neg Hx     Colon cancer Neg Hx     Breast cancer additional onset Neg Hx     Breast cancer Neg Hx      Social History     Socioeconomic History    Marital status: /Civil Union     Spouse name: Not on file    Number of children: Not on file    Years of education: Not on file    Highest education level: Not on file   Occupational History    Not on file   Tobacco Use    Smoking status: Former     Current packs/day: 0.00     Average packs/day: 0.5 packs/day for 21.4 years (10.7 ttl pk-yrs)     Types: Cigarettes     Start date: 1972     Quit date: 1993     Years since quittin.0     Passive exposure: Never    Smokeless tobacco: Never   Vaping Use    Vaping status: Never Used   Substance and Sexual Activity    Alcohol use: Never    Drug use: Never    Sexual activity: Not Currently     Partners: Male     Birth control/protection: None   Other Topics Concern    Not on file   Social History Narrative    Not on file     Social Drivers of Health     Financial Resource Strain: Not on file   Food Insecurity: Not on file   Transportation Needs: Not on file   Physical Activity: Not on file   Stress: Not on file   Social Connections: Unknown (2024)    Received from [facility]     How often do you feel lonely or isolated from those around you? (Adult - for ages 18 years and over): Not on file   Intimate Partner Violence: Not on file   Housing Stability: Not on file     Scheduled Meds:  Continuous Infusions:No current facility-administered medications for this visit.    PRN Meds:.  Allergies[1]    Physical Examination:    Ht 5' 2\" (1.575 m)   Wt 61.8 kg (136 lb 3.2 oz)   BMI 24.91 kg/m²     Gen: A&Ox3, NAD  Cardiac: regular rate  Chest: non labored breathing  Abdomen: Non-distended    Cervcial Spine: Negative Spurling's    Right Upper Extremity:  Skin CDI  2cm by 1cm round semi-firm semi-mobile mass originating from volar " aspect of distal ulna and just deep to the FCU tendon going up to the level of the distal ulna head. Transillumination positive   DRUJ stable to shuck testing.  No tenderness over the fovea or the DRUJ.  Sensation intact to light touch in the axillary median, ulnar, and radial nerve distributions  5/5 motor to FPL (AIN), EPL (PIN) and FDIO (ulnar)  2+RP        Studies:  Radiographs: I personally reviewed and independently interpreted the available radiographs.   MRI of the right wrist w/wo contrast obtained on 5/7/2025 demonstrates volar ulnar aspect of the right wrist is a 2.6 x 1.3 x 0.7 cm ganglion cyst         Bhaskar Schneider MD  Hand and Upper Extremity Surgery      *This note was dictated using Dragon voice recognition software. Please excuse any word substitutions or errors.*    Scribe Attestation      I,:  Molly Leo am acting as a scribe while in the presence of the attending physician.:       I,:  Bhaskar Schneider MD personally performed the services described in this documentation    as scribed in my presence.:                  [1]   Allergies  Allergen Reactions    Cayenne - Food Allergy Shortness Of Breath     Pt states she coughs and then can't take breaths in    Pineapple - Food Allergy Shortness Of Breath     Pt states she has a terrible cough and can't breath in    Demerol [Meperidine] Itching    Oxycodone Itching    Tegaderm Alginate Ag Rope Rash and Swelling

## 2025-05-15 NOTE — H&P (VIEW-ONLY)
Orthopedic Surgery Management Note  Jennifer Carrillo (69 y.o. female)  : 1955 Encounter Date: 5/15/2025      Assessment and Plan:    Assessment & Plan  Synovial cyst of wrist  69 y.o. female presenting with a synovial cyst arising from the ulnar wrist.  I had seen the patient previously for this and she was symptomatic at that time.  The pain is primarily with bumping because of the size of the cyst.  She does note when she hits it it is quite tender.  She also admits that she is bothered by the cosmesis of the cyst but expressed to me that she would like the cyst removed for more than just cosmetic reasons and does feel that it is functionally limiting her because of the pain.  When I previously saw her I did think it was a cyst but given its size and somewhat atypical location I referred her for an MRI which has confirm the diagnosis.  Importantly and else also confirm that there is no underlying pathology such as a TFCC tear.    I then again today revisited treatment options for the ganglion cyst.  The patient I had already had this discussion but I did discuss that for majority of ganglion cyst they are benign and I typically recommend observation.  For cyst that are symptomatic I will consider cyst aspiration versus surgical excision.  Cyst aspiration can result in a 50% recurrence rate.  Surgical excision results in a 5 to 10% recurrence rate but has the complications of surgery and I like to phrase to patients that you are treating the bump for a scar in the area.  That is why stress I do not like to perform the excision for simple cosmetic reasons but it should be because of the cyst is functionally limiting the patient in someway. Common risks include bleeding, infection, injury to nearby structures (vessels, nerves, tendons, ligaments), blood clots (deep vein thrombosis / pulmonary embolus), and wound healing problems. Infection may result in an infection of the blood stream and/or the need for oral  or intravenous antibiotics. Additional risks include impaired limb function, loss of limb and/or failure to achieve desired results. Nerve injury can result in numbness, incomplete or worsening nerve recovery.  Her cyst is about the ulnar wrist and so I discussed that the ulnar neurovascular bundle could be endangered in that area and there is the ulnar sensory nerve that runs dorsal in the area and if damage could result in a dorsal hand numbness.  Therefore it is not to be taken lightly to perform surgery for removal of the cyst.    After this full discussion the patient expressed to me that she still would like surgical excision of the cyst.  She understands there is a chance for recurrence and understands that she may still have pain on that side of the wrist even after excision.  She expressed understanding of all risks.    All questions were answered to the patient's satisfaction.  The patient agrees to comply with a standard postoperative protocol, and is willing to proceed.  There were no barriers to learning. Prior to surgery, the patient may be requested to stop all anti-inflammatory medications.  Prophylactic aspirin, Plavix, and Coumadin may be allowed to be continued.  Medications including vitamin E., ginkgo, and fish oil are requested to be stopped approximately one week prior to surgery.     she expressed understanding of the plan and agreed. We encouraged them to contact our office with any questions or concerns.     Plan will be for surgical excision of the cyst  Patient will follow-up 14 to 16 days after for reevaluation           Chief Complaint:     Right wrist pain and mass    Previous History:   The patient was seen on 4/3/2025 complaining of a mass has been there for a few months and is starting to get bigger. She is able to use her hands, but it is uncomfortable.  The discomfort is primarily in the ulnar wrist and is worse with twisting exercises.  No trauma.  It was recommended she obtain  and MRI    Interval History:  Today, she is here to review her MRI.  She expressed that the cyst is still quite symptomatic for her.  She does have pain around that area primarily when she bumps the cyst on things.  She feels it is quite large and gets in the way.  She would like the cyst removed surgically.     Past Medical History:  Past Medical History:   Diagnosis Date    Allergic Several years ago    Arthritis Several years ago    Bronchitis     Diabetes mellitus (HCC)     Elevated hemoglobin A1c     Pt states she does not take medication but is trying to lower with diet and exercise ( 7.1)    Hypertension     Kidney stone Several years ago    Lipoma of arm     LUE    Obesity Several years ago    Pneumonia     Sphincter of Oddi dysfunction     Visual impairment      Past Surgical History:   Procedure Laterality Date    BLADDER SUSPENSION      BREAST EXCISIONAL BIOPSY Left 2000    benign    BREAST SURGERY      CHOLECYSTECTOMY      COLONOSCOPY      HYSTERECTOMY  1980    IR SPINE AND PAIN PROCEDURE  04/16/2024    IR SPINE AND PAIN PROCEDURE  11/05/2024    OOPHORECTOMY Bilateral 1980    VT EXC B9 LESION MRGN XCP SK TG T/A/L 3.1-4.0 CM Left 06/03/2022    Procedure: UPPER ARM MASS EXCISION;  Surgeon: Krys Mendiola DO;  Location:  MAIN OR;  Service: General    RECTAL PROLAPSE REPAIR       Family History   Problem Relation Age of Onset    No Known Problems Mother     Diabetes Father     No Known Problems Sister     No Known Problems Daughter     Leukemia Maternal Grandmother     No Known Problems Maternal Grandfather     No Known Problems Paternal Grandmother     No Known Problems Paternal Grandfather     No Known Problems Maternal Aunt     No Known Problems Maternal Aunt     No Known Problems Paternal Aunt     No Known Problems Paternal Aunt     No Known Problems Sister     No Known Problems Daughter     BRCA2 Positive Neg Hx     BRCA2 Negative Neg Hx     BRCA1 Positive Neg Hx     BRCA1 Negative Neg Hx     BRCA  " Neg Hx     Ovarian cancer Neg Hx     Endometrial cancer Neg Hx     Colon cancer Neg Hx     Breast cancer additional onset Neg Hx     Breast cancer Neg Hx      Social History     Socioeconomic History    Marital status: /Civil Union     Spouse name: Not on file    Number of children: Not on file    Years of education: Not on file    Highest education level: Not on file   Occupational History    Not on file   Tobacco Use    Smoking status: Former     Current packs/day: 0.00     Average packs/day: 0.5 packs/day for 21.4 years (10.7 ttl pk-yrs)     Types: Cigarettes     Start date: 1972     Quit date: 1993     Years since quittin.0     Passive exposure: Never    Smokeless tobacco: Never   Vaping Use    Vaping status: Never Used   Substance and Sexual Activity    Alcohol use: Never    Drug use: Never    Sexual activity: Not Currently     Partners: Male     Birth control/protection: None   Other Topics Concern    Not on file   Social History Narrative    Not on file     Social Drivers of Health     Financial Resource Strain: Not on file   Food Insecurity: Not on file   Transportation Needs: Not on file   Physical Activity: Not on file   Stress: Not on file   Social Connections: Unknown (2024)    Received from [employer]     How often do you feel lonely or isolated from those around you? (Adult - for ages 18 years and over): Not on file   Intimate Partner Violence: Not on file   Housing Stability: Not on file     Scheduled Meds:  Continuous Infusions:No current facility-administered medications for this visit.    PRN Meds:.  Allergies[1]    Physical Examination:    Ht 5' 2\" (1.575 m)   Wt 61.8 kg (136 lb 3.2 oz)   BMI 24.91 kg/m²     Gen: A&Ox3, NAD  Cardiac: regular rate  Chest: non labored breathing  Abdomen: Non-distended    Cervcial Spine: Negative Spurling's    Right Upper Extremity:  Skin CDI  2cm by 1cm round semi-firm semi-mobile mass originating from volar " aspect of distal ulna and just deep to the FCU tendon going up to the level of the distal ulna head. Transillumination positive   DRUJ stable to shuck testing.  No tenderness over the fovea or the DRUJ.  Sensation intact to light touch in the axillary median, ulnar, and radial nerve distributions  5/5 motor to FPL (AIN), EPL (PIN) and FDIO (ulnar)  2+RP        Studies:  Radiographs: I personally reviewed and independently interpreted the available radiographs.   MRI of the right wrist w/wo contrast obtained on 5/7/2025 demonstrates volar ulnar aspect of the right wrist is a 2.6 x 1.3 x 0.7 cm ganglion cyst         Bhaskar Schneider MD  Hand and Upper Extremity Surgery      *This note was dictated using Dragon voice recognition software. Please excuse any word substitutions or errors.*    Scribe Attestation      I,:  Molly Leo am acting as a scribe while in the presence of the attending physician.:       I,:  Bhaskar Schneider MD personally performed the services described in this documentation    as scribed in my presence.:                  [1]   Allergies  Allergen Reactions    Cayenne - Food Allergy Shortness Of Breath     Pt states she coughs and then can't take breaths in    Pineapple - Food Allergy Shortness Of Breath     Pt states she has a terrible cough and can't breath in    Demerol [Meperidine] Itching    Oxycodone Itching    Tegaderm Alginate Ag Rope Rash and Swelling

## 2025-05-19 ENCOUNTER — CONSULT (OUTPATIENT)
Dept: FAMILY MEDICINE CLINIC | Facility: CLINIC | Age: 70
End: 2025-05-19
Payer: COMMERCIAL

## 2025-05-19 VITALS
HEIGHT: 62 IN | HEART RATE: 88 BPM | OXYGEN SATURATION: 99 % | SYSTOLIC BLOOD PRESSURE: 124 MMHG | TEMPERATURE: 97.8 F | BODY MASS INDEX: 25.05 KG/M2 | WEIGHT: 136.13 LBS | DIASTOLIC BLOOD PRESSURE: 80 MMHG

## 2025-05-19 DIAGNOSIS — Z01.818 PREOPERATIVE EXAMINATION: Primary | ICD-10-CM

## 2025-05-19 DIAGNOSIS — R22.31 MASS OF RIGHT WRIST: ICD-10-CM

## 2025-05-19 PROBLEM — R09.89 CHEST CONGESTION: Status: RESOLVED | Noted: 2023-11-28 | Resolved: 2025-05-19

## 2025-05-19 PROBLEM — R09.81 NASAL CONGESTION: Status: RESOLVED | Noted: 2023-11-28 | Resolved: 2025-05-19

## 2025-05-19 PROBLEM — J04.10 TRACHEITIS: Status: RESOLVED | Noted: 2023-11-28 | Resolved: 2025-05-19

## 2025-05-19 PROBLEM — M54.14 THORACIC RADICULITIS: Status: RESOLVED | Noted: 2024-04-08 | Resolved: 2025-05-19

## 2025-05-19 PROCEDURE — 99214 OFFICE O/P EST MOD 30 MIN: CPT | Performed by: NURSE PRACTITIONER

## 2025-05-19 NOTE — PROGRESS NOTES
"Name: Jennifer Carrillo      : 1955      MRN: [id number]  Encounter Provider: MARK Chatman  Encounter Date: 2025   Encounter department: [facility] PRIMARY [facility]  :  Assessment & Plan  Mass of right wrist         Preoperative examination  Cleared for her upcoming surgery.                History of Present Illness   Here for a preop evaluation for her upcoming surgery to remove the mass/lump from her right wrist.      Pre-op Exam  Surgery: mass removal from right wrist.  Surgeon: Wyatt    Previous history of bleeding disorders or clots?: No  Previous Anesthesia reaction?: No  Prolonged steroid use in the last 6 months?: No    Assessment of Cardiac Risk:   - Unstable or severe angina or MI in the last 6 weeks or history of stent placement in the last year?: No   - Decompensated heart failure (e.g. New onset heart failure, NYHA  Class IV heart failure, or worsening existing heart failure)?: No  - Significant arrhythmias such as high grade AV block, symptomatic ventricular arrhythmia, newly recognized ventricular tachycardia, supraventricular tachycardia with resting heart rate >100, or symptomatic bradycardia?: No    Pre-operative Risk Factors:  Elevated-risk surgery: No    History of cerebrovascular disease: No    History of ischemic heart disease: No  Pre-operative treatment with insulin: No  Pre-operative creatinine >2 mg/dL: No    History of congestive heart failure: No    Review of Systems   Constitutional: Negative.    HENT: Negative.     Respiratory: Negative.     Cardiovascular: Negative.    Gastrointestinal: Negative.    Musculoskeletal:         See HPI   Neurological: Negative.    All other systems reviewed and are negative.      Objective   /80 (BP Location: Left arm, Patient Position: Sitting, Cuff Size: Standard)   Pulse 88   Temp 97.8 °F (36.6 °C) (Temporal)   Ht 5' 2\" (1.575 m)   Wt 61.7 kg (136 lb 2 oz)   SpO2 99%   BMI 24.90 kg/m²      Physical " Exam  Constitutional:       Appearance: Normal appearance.     Cardiovascular:      Rate and Rhythm: Normal rate and regular rhythm.   Pulmonary:      Effort: Pulmonary effort is normal.      Breath sounds: Normal breath sounds.     Musculoskeletal:      Comments: Mass to outer portion of both right and left wrist.      Neurological:      Mental Status: She is alert and oriented to person, place, and time.

## 2025-05-22 NOTE — PRE-PROCEDURE INSTRUCTIONS
Pre-Surgery Instructions:   Medication Instructions    amLODIPine (NORVASC) 10 mg tablet Take day of surgery.    DULoxetine (CYMBALTA) 30 mg delayed release capsule Take day of surgery.    Semaglutide-Weight Management (Wegovy) 2.4 MG/0.75ML (LD 5/17) Stop taking 7 days prior to surgery.    Sennosides (Laxative Pills) 25 MG TABS Take night before surgery     Medication instructions for day of surgery reviewed. Please take all instructed medications with only a sip of water.       You will receive a call one business day prior to surgery with an arrival time and hospital directions. If your surgery is scheduled on a Monday, the hospital will be calling you on the Friday prior to your surgery. If you have not heard from anyone by 8pm, please call the hospital supervisor through the hospital  at [phone]. ([place] [phone] or [place] [phone]).    Do not eat or drink anything after midnight the night before your surgery, including candy, mints, lifesavers, or chewing gum. Do not drink alcohol 24hrs before your surgery. Try not to smoke at least 24hrs before your surgery.       Follow the pre surgery showering instructions as listed in the “My Surgical Experience Booklet” or otherwise provided by your surgeon's office. Do not use a blade to shave the surgical area 1 week before surgery. It is okay to use a clean electric clippers up to 24 hours before surgery. Do not apply any lotions, creams, including makeup, cologne, deodorant, or perfumes after showering on the day of your surgery. Do not use dry shampoo, hair spray, hair gel, or any type of hair products.     No contact lenses, eye make-up, or artificial eyelashes. Remove nail polish, including gel polish, and any artificial, gel, or acrylic nails if possible. Remove all jewelry including rings and body piercing jewelry.     Wear causal clothing that is easy to take on and off. Consider your type of surgery.    Keep any valuables, jewelry,  piercings at home. Please bring any specially ordered equipment (sling, braces) if indicated.    Arrange for a responsible person to drive you to and from the hospital on the day of your surgery. Please confirm the visitor policy for the day of your procedure when you receive your phone call with an arrival time.     Call the surgeon's office with any new illnesses, exposures, or additional questions prior to surgery.    Please reference your “My Surgical Experience Booklet” for additional information to prepare for your upcoming surgery.

## 2025-05-23 ENCOUNTER — ANESTHESIA EVENT (OUTPATIENT)
Dept: PERIOP | Facility: HOSPITAL | Age: 70
End: 2025-05-23
Payer: COMMERCIAL

## 2025-05-23 NOTE — ANESTHESIA PREPROCEDURE EVALUATION
Procedure:  Right wrist mass excision (Right: Wrist)    Relevant Problems   GI/HEPATIC   (+) Hepatic steatosis   (+) Sphincter of Oddi dysfunction      MUSCULOSKELETAL   (+) Arthritis   (+) Primary osteoarthritis of left shoulder   (+) Sphincter of Oddi dysfunction        Physical Exam    Airway     Mallampati score: II  TM Distance: >3 FB  Neck ROM: full  Mouth opening: >= 4 cm      Cardiovascular  Cardiovascular exam normal    Dental   No notable dental hx     Pulmonary  Pulmonary exam normal     Neurological  - normal exam    Other Findings  post-pubertal.    Normal sinus rhythm  Minimal voltage criteria for LVH, may be normal variant ( R in aVL )  Anterior infarct (cited on or before 17-Sep-2024)  Abnormal ECG  When compared with ECG of 17-Sep-2024 05:27,  Nonspecific T wave abnormality, improved in Inferior leads  Nonspecific T wave abnormality, improved in Lateral leads           Anesthesia Plan  ASA Score- 2     Anesthesia Type- IV sedation with anesthesia with ASA Monitors.         Additional Monitors:     Airway Plan: natural airway.           Plan Factors-Exercise tolerance (METS): >4 METS.     EKG reviewed.       Patient is not a current smoker.  Patient did not smoke on day of surgery.    Obstructive sleep apnea risk education given perioperatively.        Induction- intravenous.    Postoperative Plan- Plan for postoperative opioid use.   Monitoring Plan - Monitoring plan - standard ASA monitoring  Post Operative Pain Plan - plan for postoperative opioid use    Perioperative Resuscitation Plan - Level 1 - Full Code.       Informed Consent- Anesthetic plan and risks discussed with patient.  I personally reviewed this patient with the CRNA. Discussed and agreed on the Anesthesia Plan with the CRNA..      NPO Status:  No vitals data found for the desired time range.

## 2025-05-27 ENCOUNTER — ANESTHESIA (OUTPATIENT)
Dept: PERIOP | Facility: HOSPITAL | Age: 70
End: 2025-05-27
Payer: COMMERCIAL

## 2025-05-27 ENCOUNTER — HOSPITAL ENCOUNTER (OUTPATIENT)
Facility: HOSPITAL | Age: 70
Setting detail: OUTPATIENT SURGERY
Discharge: HOME | End: 2025-05-27
Attending: STUDENT IN AN ORGANIZED HEALTH CARE EDUCATION/TRAINING PROGRAM | Admitting: STUDENT IN AN ORGANIZED HEALTH CARE EDUCATION/TRAINING PROGRAM
Payer: COMMERCIAL

## 2025-05-27 VITALS
TEMPERATURE: 97.1 F | DIASTOLIC BLOOD PRESSURE: 74 MMHG | WEIGHT: 136 LBS | BODY MASS INDEX: 25.03 KG/M2 | RESPIRATION RATE: 17 BRPM | HEIGHT: 62 IN | HEART RATE: 80 BPM | OXYGEN SATURATION: 92 % | SYSTOLIC BLOOD PRESSURE: 119 MMHG

## 2025-05-27 DIAGNOSIS — M71.339 SYNOVIAL CYST OF WRIST: ICD-10-CM

## 2025-05-27 LAB — GLUCOSE SERPL-MCNC: 118 MG/DL (ref 65–140)

## 2025-05-27 PROCEDURE — 25111 REMOVE WRIST TENDON LESION: CPT | Performed by: STUDENT IN AN ORGANIZED HEALTH CARE EDUCATION/TRAINING PROGRAM

## 2025-05-27 PROCEDURE — 88304 TISSUE EXAM BY PATHOLOGIST: CPT | Performed by: STUDENT IN AN ORGANIZED HEALTH CARE EDUCATION/TRAINING PROGRAM

## 2025-05-27 PROCEDURE — 25111 REMOVE WRIST TENDON LESION: CPT

## 2025-05-27 PROCEDURE — 82948 REAGENT STRIP/BLOOD GLUCOSE: CPT

## 2025-05-27 RX ORDER — BUPIVACAINE HYDROCHLORIDE 2.5 MG/ML
INJECTION, SOLUTION EPIDURAL; INFILTRATION; INTRACAUDAL; PERINEURAL AS NEEDED
Status: DISCONTINUED | OUTPATIENT
Start: 2025-05-27 | End: 2025-05-27 | Stop reason: HOSPADM

## 2025-05-27 RX ORDER — ACETAMINOPHEN 325 MG/1
975 TABLET ORAL ONCE
Status: COMPLETED | OUTPATIENT
Start: 2025-05-27 | End: 2025-05-27

## 2025-05-27 RX ORDER — MIDAZOLAM HYDROCHLORIDE 2 MG/2ML
INJECTION, SOLUTION INTRAMUSCULAR; INTRAVENOUS AS NEEDED
Status: DISCONTINUED | OUTPATIENT
Start: 2025-05-27 | End: 2025-05-27

## 2025-05-27 RX ORDER — CEFAZOLIN SODIUM 2 G/50ML
2000 SOLUTION INTRAVENOUS ONCE
Status: COMPLETED | OUTPATIENT
Start: 2025-05-27 | End: 2025-05-27

## 2025-05-27 RX ORDER — ONDANSETRON 2 MG/ML
4 INJECTION INTRAMUSCULAR; INTRAVENOUS EVERY 6 HOURS PRN
Status: DISCONTINUED | OUTPATIENT
Start: 2025-05-27 | End: 2025-05-27 | Stop reason: HOSPADM

## 2025-05-27 RX ORDER — SODIUM CHLORIDE, SODIUM LACTATE, POTASSIUM CHLORIDE, CALCIUM CHLORIDE 600; 310; 30; 20 MG/100ML; MG/100ML; MG/100ML; MG/100ML
20 INJECTION, SOLUTION INTRAVENOUS CONTINUOUS
Status: DISCONTINUED | OUTPATIENT
Start: 2025-05-27 | End: 2025-05-27 | Stop reason: HOSPADM

## 2025-05-27 RX ORDER — ACETAMINOPHEN 500 MG
500 TABLET ORAL EVERY 6 HOURS PRN
Qty: 40 TABLET | Refills: 0 | Status: SHIPPED | OUTPATIENT
Start: 2025-05-27 | End: 2025-06-12

## 2025-05-27 RX ORDER — LIDOCAINE HYDROCHLORIDE 10 MG/ML
INJECTION, SOLUTION EPIDURAL; INFILTRATION; INTRACAUDAL; PERINEURAL AS NEEDED
Status: DISCONTINUED | OUTPATIENT
Start: 2025-05-27 | End: 2025-05-27

## 2025-05-27 RX ORDER — ONDANSETRON 2 MG/ML
INJECTION INTRAMUSCULAR; INTRAVENOUS AS NEEDED
Status: DISCONTINUED | OUTPATIENT
Start: 2025-05-27 | End: 2025-05-27

## 2025-05-27 RX ORDER — LIDOCAINE HYDROCHLORIDE AND EPINEPHRINE 10; 10 MG/ML; UG/ML
INJECTION, SOLUTION INFILTRATION; PERINEURAL AS NEEDED
Status: DISCONTINUED | OUTPATIENT
Start: 2025-05-27 | End: 2025-05-27 | Stop reason: HOSPADM

## 2025-05-27 RX ORDER — PROPOFOL 10 MG/ML
INJECTION, EMULSION INTRAVENOUS CONTINUOUS PRN
Status: DISCONTINUED | OUTPATIENT
Start: 2025-05-27 | End: 2025-05-27

## 2025-05-27 RX ORDER — ACETAMINOPHEN 325 MG/1
650 TABLET ORAL EVERY 6 HOURS PRN
Status: DISCONTINUED | OUTPATIENT
Start: 2025-05-27 | End: 2025-05-27 | Stop reason: HOSPADM

## 2025-05-27 RX ORDER — CHLORHEXIDINE GLUCONATE ORAL RINSE 1.2 MG/ML
15 SOLUTION DENTAL ONCE
Status: COMPLETED | OUTPATIENT
Start: 2025-05-27 | End: 2025-05-27

## 2025-05-27 RX ORDER — HYDROMORPHONE HCL/PF 1 MG/ML
0.5 SYRINGE (ML) INJECTION
Status: COMPLETED | OUTPATIENT
Start: 2025-05-27 | End: 2025-05-27

## 2025-05-27 RX ORDER — FENTANYL CITRATE 50 UG/ML
INJECTION, SOLUTION INTRAMUSCULAR; INTRAVENOUS AS NEEDED
Status: DISCONTINUED | OUTPATIENT
Start: 2025-05-27 | End: 2025-05-27

## 2025-05-27 RX ORDER — NAPROXEN 500 MG/1
500 TABLET ORAL 2 TIMES DAILY WITH MEALS
Qty: 20 TABLET | Refills: 0 | Status: SHIPPED | OUTPATIENT
Start: 2025-05-27 | End: 2025-06-12

## 2025-05-27 RX ADMIN — ACETAMINOPHEN 975 MG: 325 TABLET ORAL at 06:27

## 2025-05-27 RX ADMIN — MIDAZOLAM 2 MG: 1 INJECTION INTRAMUSCULAR; INTRAVENOUS at 07:26

## 2025-05-27 RX ADMIN — PROPOFOL 100 MCG/KG/MIN: 10 INJECTION, EMULSION INTRAVENOUS at 07:30

## 2025-05-27 RX ADMIN — CEFAZOLIN SODIUM 2000 MG: 2 SOLUTION INTRAVENOUS at 07:26

## 2025-05-27 RX ADMIN — HYDROMORPHONE HYDROCHLORIDE 0.5 MG: 1 INJECTION, SOLUTION INTRAMUSCULAR; INTRAVENOUS; SUBCUTANEOUS at 08:22

## 2025-05-27 RX ADMIN — FENTANYL CITRATE 25 MCG: 0.05 INJECTION, SOLUTION INTRAMUSCULAR; INTRAVENOUS at 07:46

## 2025-05-27 RX ADMIN — CHLORHEXIDINE GLUCONATE 0.12% ORAL RINSE 15 ML: 1.2 LIQUID ORAL at 06:27

## 2025-05-27 RX ADMIN — ONDANSETRON 4 MG: 2 INJECTION, SOLUTION INTRAMUSCULAR; INTRAVENOUS at 07:30

## 2025-05-27 RX ADMIN — FENTANYL CITRATE 25 MCG: 0.05 INJECTION, SOLUTION INTRAMUSCULAR; INTRAVENOUS at 07:56

## 2025-05-27 RX ADMIN — SODIUM CHLORIDE, SODIUM LACTATE, POTASSIUM CHLORIDE, AND CALCIUM CHLORIDE: .6; .31; .03; .02 INJECTION, SOLUTION INTRAVENOUS at 07:26

## 2025-05-27 RX ADMIN — FENTANYL CITRATE 25 MCG: 0.05 INJECTION, SOLUTION INTRAMUSCULAR; INTRAVENOUS at 08:03

## 2025-05-27 RX ADMIN — HYDROMORPHONE HYDROCHLORIDE 0.5 MG: 1 INJECTION, SOLUTION INTRAMUSCULAR; INTRAVENOUS; SUBCUTANEOUS at 08:34

## 2025-05-27 RX ADMIN — LIDOCAINE HYDROCHLORIDE 30 MG: 10 INJECTION, SOLUTION EPIDURAL; INFILTRATION; INTRACAUDAL at 07:30

## 2025-05-27 RX ADMIN — FENTANYL CITRATE 25 MCG: 0.05 INJECTION, SOLUTION INTRAMUSCULAR; INTRAVENOUS at 07:30

## 2025-05-27 NOTE — OP NOTE
OPERATIVE REPORT  PATIENT NAME: Jennifer Carrillo    :  1955  MRN: [id number]  Pt Location: [facility] OR ROOM 01    SURGERY DATE: 2025    Surgeons and Role:     * Bhaskar Schneider MD - Primary     * Ashli Jordan PA-C - Assisting    Preop Diagnosis:  Synovial cyst of wrist [M71.339]    Post-Op Diagnosis Codes:     * Synovial cyst of wrist [M71.339]    Procedure(s):  Right - Right wrist mass excision    Specimen(s):  ID Type Source Tests Collected by Time Destination   1 : Right wrist mass Tissue Joint, Right Wrist TISSUE EXAM Bhaskar Schneider MD 2025 [id number]        Estimated Blood Loss:   Minimal    Drains:  * No LDAs found *    Anesthesia Type:   IV Sedation with Anesthesia    Operative Indications:  Synovial cyst of wrist [M71.339]  70 year old female who presents with about a half a year of a right wrist ulnar swelling consistent with a ganglion cyst. I had several discussions with the patient about her findings and we discussed the treatment options. Specifically I discussed that ganglion cysts are benign and do no require excision. The indication for removal is those that have become symptomatic due to their size. I also discussed that if the cyst was a concern to the patient then we could perform a cyst aspiration. The patient expressed understanding of this option but did not want it as she did not want to have a needle aspiration if there was not a high chance of complete resolution of the cyst. She expressed that at 50% recurrence with aspiration she would just want the excision.  We also discussed that conservative management with splinting is the recommended option if pain is the main cause but the patient expressed to me that she has been doing this on her own and has not had any relief with it. I did discuss the significant risks that are asscoicated with surgery and that in this area not only would there be a 5-10% recurrence rate but also a significant risk of injury to the dorsal cutaneous  sensory branch of the ulnar nerve in addition to the other risks of surgery.  Additionally I discussed that although the MRI did not demonstrate any other pathology it is possible that while the cyst could be contributing it is not the entire source of her pain and therefore she could still have pain postoperatively.  The patient expressed understanding with all this and still wanted to proceed with surgery.  Therefore given the chronic nature of her cyst as well as the symptomatic nature of her cyst, I have indicated the patient for surgical excision.    Operative Findings:  There was a 3 cm ganglion cyst emanating from the ulnar aspect of the capsule of the radiocarpal joint just at the edge of the TFCC origin.  The cyst propagated proximally and was largely superficial to the deep fascia.         Procedure and Technique:  The patient was greeted in the preoperative area. The risks, benefits, and alternatives of the procedure were again discussed in detail with the patient. Risks include pain, stiffness, swelling, injury to neurovascular structure, infection, need for reoperation, and anesthetic complications. The patient was amenable to proceed. The operative extremity was marked. Patient was brought to the operating room and placed under anesthesia. A tourniquet was applied to the operative extremity. The operative extremity was prepped and draped in the usual sterile fashion. A timeout was performed identifying the name and laterality of the procedure.    An esmarch bandage was applied and tourniquet inflated to 250mm Hg.     A local injection of 10 cc 1:1 mix of 1% lidocaine with epinephrine and 0.25% marcaine was performed.     A 3cm logitudinal incision was carried out along the ulnar wrist overlying the cyst. Skin and subcutaneous tissue was sharply incised. Traversing dorsal cutaneous nerves were mobilized and retracted.  The flexor carpi ulnaris tendon was identified volar and retracted and the ulnar  neurovascular bundle was identified deep and protected. The mass was identified and carefully dissected circumferentially.  The mass was noted to be cystic in nature, with an identifiable stalk originating at the distal radiocarpal joint just at the distal edge of the ulnar styloid. The mass, stalk and small portion of the wrist capsule were removed en bloc and sent for pathology. Bipolar was used to cauterize the base.  The DRUJ was manually inspected for stability and found to be stable in pronation, supination, and neutral.  The stability of the ECU tendon was also checked and found to be stable with no subluxation.  The ECU sheath was visualized and demonstrated to be intact.  The tourniquet was deflated and hemostasis was achieved with bipolar cautery.     The skin was closed with 3-0 vicryl in the subcutaneous tissue, 4-0 monocryl suture in the dermis, skin glue and steri strips.     Sterile dressing was applied and a soft wrist wrap.     Post operatively the patient will be non weight bearing on the operative extremity. They will remove the dressing in 5 days. They will follow up as planned within 2 weeks. We will discuss the final pathology findings at follow up appointment.     Tourniquet time: 15 minutes, 250 mmHg    I was present for the entire procedure. A qualified resident physician was not available and a physician assistant was required during the procedure for retraction, tissue handling, dissection and suturing.     Complications:   None     Patient Disposition:  PACU     Plan:  Keep dressing clean and dry for 5 days. Dressing can then be removed.  No lifting heavier than a cup of coffee for 2 weeks  Follow up in office in 2 weeks           SIGNATURE: Bhaskar Schneider MD  DATE: May 27, 2025  TIME: 8:37 AM

## 2025-05-27 NOTE — ANESTHESIA POSTPROCEDURE EVALUATION
Post-Op Assessment Note    CV Status:  Stable    Pain management: satisfactory to patient       Mental Status:  Alert and awake   Hydration Status:  Stable   PONV Controlled:  None   Airway Patency:  Patent     Post Op Vitals Reviewed: Yes    No anethesia notable event occurred.    Staff: CRNA           Last Filed PACU Vitals:  Vitals Value Taken Time   Temp 97    Pulse 92 05/27/25 08:12   /67 05/27/25 08:12   Resp 19 05/27/25 08:12   SpO2 99 % 05/27/25 08:12   Vitals shown include unfiled device data.

## 2025-05-27 NOTE — DISCHARGE INSTR - AVS FIRST PAGE
POSTOPERATIVE INSTRUCTIONS - Dr. Bhaskar Schneider (Orthopedic Surgery)    Follow-up Appointments  Please call to set up/confirm your first postoperative visit with Dr. Schneider in 10-14 days    Dressing and Wound Care  A dressing has been placed on your hand/arm to keep the incisions clean.  Keep your dressing clean and dry.     You may remove the dressing 5 days after surgery. Once the dressing is off, your incision can get wet while showering/bathing only. Do not soak the wound (in bath water, pool, lake, etc.). Otherwise, keep your incision covered with Band-Aids or light dressing. Keep it dry and do not apply any ointments.    Wear a plastic bag over your dressing/splint whenever you take a shower or bath until you are allowed to remove it  Swelling is normal after surgery.  Elevate your hand/arm so the surgical site is above your heart to decrease the swelling.  Swelling is like water, it runs downhill.  This is especially important for the first 72 hours after surgery.  The best way to elevate your hand/arm is with your fingers pointing towards the ceiling and your hand/arm above the level of the heart.  You can use pillows to help prop your hand/arm up when sitting or lying down.         If you are experiencing pain, be sure you are elevating your hand/arm as often as possible.  Apply an ice pack over your dressing/splint for 20 minutes of every hour for the first 3 days when you are awake. This can help to reduce swelling and inflammation. Be sure the ice pack is waterproof so it does not leak on the dressing/splint. A simple ice pack can be made by adding ten cubes and a small amount of water in a small zip-lock bag. Seal this small bag tightly. Place this small bag in a larger zip lock bag. Apply to the area in pain.  If the dressing feels too tight in spite of elevation, loosen the outer wrap but do not remove the entire dressing.  If you have exposed pins/wires, take clean gauze or a cotton tip applicator (like  a Q-tip), get it wet in clean warm water mixed with non-scented hand soap (like Dove, Ivory, Dial, etc.), and gently wipe around the base of the pin where it comes through the skin once a day. Do not use water from a well to clean as this has bacteria in it and can contribute to infections.     ACTIVITIES:  Bend and straighten the parts of your hand, wrist, elbow, and shoulder that are not included in your surgical dressing or splint. Do this at least 6 times a day, as this will help decrease swelling and speed up your recovery.  This includes your fingers. See the instructions listed below for finger motion. THIS IS VERY IMPORTANT FOR YOUR RECOVERY!        POSTOPERATIVE CARE/CONCERNS:  You may experience some temporary numbness in your fingers.  You should have very little to no bleeding on your dressing.  Notify the office (see contact info at bottom of page) for any of the following:  Excessive pain not relieved by rest, elevation, and pain medications  Feeling that the dressing is too tight in spite of adequately elevating hand/arm  Active bleeding through the dressing  Drainage from the wound site or pin sites  Foul odor from the dressing/wound  Temperature greater that 101? F or chills  Blue or excessively cold fingertips  Numbness of the fingertips that does not improve in spite of adequately elevating hand/arm    PAIN MEDICATION:   Pain is a normal part of the recovery after surgery. The pain medication provided to you will help to decrease the discomfort but will not completely eliminate the pain.      A prescription for an anti-inflammatory (naproxen) were called in to your pharmacy. Please take the anti-inflammatory medication AND over-the-counter acetaminophen (Tylenol) regularly.   In general tylenol can be taken as follows: 500 mg every 4-6 hours, not to exceed 3000 mg in a 24-hour period  Do not take anti-inflammatories on an empty stomach.  Your pain should decrease over the first few days after  surgery which will allow you to take less pain medicine, increase the time between doses of medication, or stop taking all pain medicine.      Additional Information  Remove dressings in 5 days and wash with soap and water.  Use a band-aid if needed.    Move your fingers 10 times per hour.  Do not move any splinted fingers., Ice to area 15 minutes each hour for 24 hours., No sling is needed.    Please arrange for a follow-up in 10-14 days.

## 2025-05-27 NOTE — INTERVAL H&P NOTE
H&P reviewed. After examining the patient I find no changes in the patients condition since the H&P had been written.    Vitals:    05/27/25 0621   BP: 118/82   Pulse: 74   Resp: 18   Temp: 97.9 °F (36.6 °C)   SpO2: 98%

## 2025-05-29 PROCEDURE — 88304 TISSUE EXAM BY PATHOLOGIST: CPT | Performed by: STUDENT IN AN ORGANIZED HEALTH CARE EDUCATION/TRAINING PROGRAM

## 2025-06-11 RX ORDER — SEMAGLUTIDE 2.4 MG/.75ML
2.4 INJECTION, SOLUTION SUBCUTANEOUS WEEKLY
Qty: 2 ML | Refills: 0 | Status: SHIPPED | OUTPATIENT
Start: 2025-06-11

## 2025-06-12 ENCOUNTER — HOSPITAL ENCOUNTER (OUTPATIENT)
Dept: RADIOLOGY | Facility: CLINIC | Age: 70
End: 2025-06-12
Payer: COMMERCIAL

## 2025-06-12 ENCOUNTER — OFFICE VISIT (OUTPATIENT)
Dept: OBGYN CLINIC | Facility: CLINIC | Age: 70
End: 2025-06-12

## 2025-06-12 VITALS — HEIGHT: 62 IN | BODY MASS INDEX: 25.14 KG/M2 | WEIGHT: 136.6 LBS

## 2025-06-12 DIAGNOSIS — R22.32 MASS OF LEFT WRIST: ICD-10-CM

## 2025-06-12 DIAGNOSIS — Z98.890 H/O REMOVAL OF CYST: Primary | ICD-10-CM

## 2025-06-12 PROCEDURE — 99024 POSTOP FOLLOW-UP VISIT: CPT | Performed by: STUDENT IN AN ORGANIZED HEALTH CARE EDUCATION/TRAINING PROGRAM

## 2025-06-12 PROCEDURE — 73110 X-RAY EXAM OF WRIST: CPT

## 2025-06-12 NOTE — PROGRESS NOTES
Assessment and Plan:  Assessment & Plan  H/O removal of cyst  70 y.o. female presents 2 weeks status post right wrist mass excision.  Intraoperative examination demonstrated a large ganglion cyst.  Incision is healing well without signs of acute infection.   Patient is doing well s/p surgical intervention.   At this point she has essentially normal wrist range of motion and she has no pain on examination.  The biggest concern that she had today was her left wrist which she states that she thinks she also has a cyst on this wrist.  She is very happy with how her right wrist has progressed and she is now eager to have the left wrist evaluated for possible treatment.  I did discuss with her that I would like her to wait at least 6 weeks from the time of surgery before we could consider doing anything and that as we had this discussion before about this I would like her to pursue and exhaust conservative management before we would do anything for the left wrist.  However I am happy she has done so well with the right side.  We will continue to follow her for the right.  I discussed with her I would like to see her in 4 weeks to make sure she is having no issues.       Mass of left wrist  On her left wrist I do not appreciate a mass anything close to the notable size of the cyst that was present on her right wrist.  Instead on the left wrist I appreciate primarily tenderness about the fovea with pain with ulnar deviation.  I suspect some sort of TFCC pathology present on the side.  There is some bit of a fullness on the ulnar aspect of the distal ulna which could be small arising ganglion cyst.  I discussed with the patient that as she is complaining of pain from the left wrist I would recommend bracing.  She expressed to me that she has done bracing for this in the past.  She thinks that her left wrist symptoms started about the time as her right wrist symptoms just were not as severe.  She has done 6 weeks of splinting  on the left just as she did on the right side previously.  Xrays of left wrist ordered today.  X-rays were relatively unremarkable although there were some cystic changes about the distal ulnar styloid  MRI of left wrist ordered for further evaluation.   Follow-up after MRI results.     Orders:    XR wrist 3+ vw left; Future    MRI wrist left wo contrast; Future        she expressed understanding of the plan and agreed. We encouraged them to contact our office with any questions or concerns.       Chief Complaint:     Post op follow-up    Surgery:  Right wrist mass excision - 5/27/2025    History of Present Illness:   Patient presents now 2 weeks status post the above surgery.  Patient reports that since surgery her preoperative pain has completely resolved.  She has been using and moving the wrist without any issues and is very happy with her progress so far.  As mentioned she is having no issues with pain.  Additionally as she has no numbness or tingling.  She reports that her wrist feels new on the side.  Her main concern now is her left wrist.  She states that around the time that her right wrist started hurting her left wrist also started hurting.  She feels a fullness on the ulnar side of her left wrist although admits it is not as significant as the right wrist was.  She thinks she has a similar mass in her left wrist and would like this removed.  She does feel that the pain is limiting her function daily.  She does report that she has attempted bracing with minimal relief.      Patient questions if she can have mass removed from the left wrist as well. Patient states swelling and pain have been present for months.     Past Medical History:  Past Medical History[1]  Past Surgical History[2]  Family History[3]  Social History     Socioeconomic History    Marital status: /Civil Union     Spouse name: Not on file    Number of children: Not on file    Years of education: Not on file    Highest education  "level: Not on file   Occupational History    Not on file   Tobacco Use    Smoking status: Former     Current packs/day: 0.00     Average packs/day: 0.5 packs/day for 21.4 years (10.7 ttl pk-yrs)     Types: Cigarettes     Start date: 1972     Quit date: 1993     Years since quittin.0     Passive exposure: Never    Smokeless tobacco: Never   Vaping Use    Vaping status: Never Used   Substance and Sexual Activity    Alcohol use: Never    Drug use: Never    Sexual activity: Not Currently     Partners: Male     Birth control/protection: None   Other Topics Concern    Not on file   Social History Narrative    Not on file     Social Drivers of Health     Financial Resource Strain: Not on file   Food Insecurity: Not on file   Transportation Needs: Not on file   Physical Activity: Not on file   Stress: Not on file   Social Connections: Unknown (2024)    Received from [employer]     How often do you feel lonely or isolated from those around you? (Adult - for ages 18 years and over): Not on file   Intimate Partner Violence: Not on file   Housing Stability: Not on file     Scheduled Meds:  Continuous Infusions:No current facility-administered medications for this visit.    PRN Meds:.  Allergies[4]      PHYSICAL EXAMINATION:    Ht 5' 2\" (1.575 m)   Wt 62 kg (136 lb 9.6 oz)   BMI 24.98 kg/m²     Gen: A&Ox3, NAD    Right Upper Extremity:  Incision healing well without signs of infection   No recurrence of the cyst  There is some mild tenderness directly over the incision site but no tenderness throughout the remainder of the wrist  She has full painless wrist range of motion with pronosupination to 80 degrees in each direction in flexion extension to 80 degrees in each direction.  She is able to make a full composite fist with full strength and no issues  Sensation intact to light touch in the axillary median, ulnar, and radial nerve distributions  Motor is intact with 5/5 AIN, PIN, and " ulnar nerve  Hand is warm and well-perfused    Left Upper Extremity:  Skin is intact.  There are no obvious effusions or swellings about the wrist  There is some mild fullness in the tip of the ulnar styloid with some tenderness over the fovea specifically.  The patient additionally has some mild pain with ulnar deviation and grind testing.  She otherwise has no pain throughout the wrist.  The DRUJ is stable to shuck testing.  The ECU is stable.  Full FDS, FDP, extensor mechanisms are intact  No rotational deformity with composite finger flexion  Demonstrates normal wrist, elbow, and shoulder motion  Forearm compartments are soft and supple  2+ distal radial pulse with brisk capillary refill to the fingers  Radial, median, and ulnar motor and sensory distribution intact  Sensations light to touch intact distally        STUDIES REVIEWED:  Xrays, left wrist, 6/12/2025: no acute fracture or dislocation.  There are some mild cystic changes at the tip of the ulnar styloid        Bhaskar Schneider MD  Hand and Upper Extremity Surgery          *This note was dictated using Dragon voice recognition software. Please excuse any word substitutions or errors.*      Scribe Attestation      I,:  Ashli Jordan PA-C am acting as a scribe while in the presence of the attending physician.:       I,:  Bhaskar Schneider MD personally performed the services described in this documentation    as scribed in my presence.:                    [1]   Past Medical History:  Diagnosis Date    Allergic Several years ago    Arthritis Several years ago    Bronchitis     COVID 2023    Diabetes mellitus (HCC)     Elevated hemoglobin A1c     Pt states she does not take medication but is trying to lower with diet and exercise ( 7.1)    Hypertension     Kidney stone Several years ago    Lipoma of arm     LUE    Obesity Several years ago    Pneumonia     Sphincter of Oddi dysfunction     Visual impairment     Wears dentures     Wears glasses    [2]    Past Surgical History:  Procedure Laterality Date    BLADDER SUSPENSION      BREAST EXCISIONAL BIOPSY Left 2000    benign    BREAST SURGERY Left     lumpectomy    CHOLECYSTECTOMY      COLONOSCOPY      HYSTERECTOMY  1980    IR SPINE AND PAIN PROCEDURE  04/16/2024    IR SPINE AND PAIN PROCEDURE  11/05/2024    MASS EXCISION Right 5/27/2025    Procedure: Right wrist mass excision;  Surgeon: Bhaskar Schneider MD;  Location: [facility];  Service: Orthopedics    OOPHORECTOMY Bilateral 1980    VT EXC B9 LESION MRGN XCP SK TG T/A/L 3.1-4.0 CM Left 06/03/2022    Procedure: UPPER ARM MASS EXCISION;  Surgeon: Krys Mendiola DO;  Location: [facility];  Service: General    RECTAL PROLAPSE REPAIR      TRANSDUODENAL SPHINCTEROTOMY / SPHINCTEROPLASTY      sphincter of oddi   [3]   Family History  Problem Relation Name Age of Onset    No Known Problems Mother      Diabetes Father Cullen Godoy     No Known Problems Sister      No Known Problems Daughter      Leukemia Maternal Grandmother      No Known Problems Maternal Grandfather      No Known Problems Paternal Grandmother      No Known Problems Paternal Grandfather      No Known Problems Maternal Aunt      No Known Problems Maternal Aunt      No Known Problems Paternal Aunt      No Known Problems Paternal Aunt      No Known Problems Sister      No Known Problems Daughter      BRCA2 Positive Neg Hx      BRCA2 Negative Neg Hx      BRCA1 Positive Neg Hx      BRCA1 Negative Neg Hx      BRCA 1/2 Neg Hx      Ovarian cancer Neg Hx      Endometrial cancer Neg Hx      Colon cancer Neg Hx      Breast cancer additional onset Neg Hx      Breast cancer Neg Hx     [4]   Allergies  Allergen Reactions    [place] - Food Allergy Shortness Of Breath     Pt states she coughs and then can't take breaths in    Pineapple - Food Allergy Shortness Of Breath     Pt states she has a terrible cough and can't breath in    Demerol [Meperidine] Itching    Oxycodone Itching    Tramadol Hyperactivity    Tegaderm  Alginate Ag Rope Rash and Swelling

## 2025-06-22 ENCOUNTER — HOSPITAL ENCOUNTER (OUTPATIENT)
Dept: MRI IMAGING | Facility: HOSPITAL | Age: 70
Discharge: HOME | End: 2025-06-22
Payer: COMMERCIAL

## 2025-06-22 DIAGNOSIS — R22.32 MASS OF LEFT WRIST: ICD-10-CM

## 2025-06-22 PROCEDURE — 73221 MRI JOINT UPR EXTREM W/O DYE: CPT

## 2025-06-27 ENCOUNTER — OFFICE VISIT (OUTPATIENT)
Dept: OBGYN CLINIC | Facility: CLINIC | Age: 70
End: 2025-06-27

## 2025-06-27 VITALS — BODY MASS INDEX: 25.03 KG/M2 | WEIGHT: 136 LBS | HEIGHT: 62 IN

## 2025-06-27 DIAGNOSIS — M24.132 DEGENERATIVE TEAR OF TRIANGULAR FIBROCARTILAGE COMPLEX (TFCC) OF LEFT WRIST: Primary | ICD-10-CM

## 2025-06-27 PROCEDURE — 99024 POSTOP FOLLOW-UP VISIT: CPT | Performed by: STUDENT IN AN ORGANIZED HEALTH CARE EDUCATION/TRAINING PROGRAM

## 2025-06-30 NOTE — PROGRESS NOTES
Orthopedic Surgery Management Note  Jennifer Carrillo (70 y.o. 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 She reports that since the surgery her pain in her right wrist has resolved and she is happy with her outcome. Her incision appears well healed and she reports to full return to regular activities.     The main issues she follows up for today is that, now that her right wrist pain has resolved, she has become increasingly aware of left wrist pain. She thought that she might have another cyst about the ulnar head on this side. I felt a fullness in the area but was more concerned for a TFCC tear and therefore obtained an MRI. The MRI has confirmed a partial tear of the TFCC with degenerative changes of the ulnar styloid. There is a ganglion cyst but this is small I I think her symptoms are due to the TFCC tear. We reviewed the MRI and discussed treatment options today. I discussed both conservative and operative options. The patient has elected to proceed with conservative management at this time in the form of a corticosteroid injection and a digit widget for functional wear.     Risks, benefits and alternative treatments of the corticosteroid were discussed with the patient. These risks of injection include but are not limited to: bleeding, infection, allergic reaction to agents, possible increase in pain, and/or elevation in blood sugar. Patient understands and elected to proceed with the proposed procedure. Patient tolerated the procedure well without any complications. See procedure note for details.     She can can activities as tolerated on both wrists. She will follow up with me in 1 month for reevaluation of the operative right wrist. No  xrays at that time.            Chief Complaint:     Right wrist pain and mass    Previous History:   The patient was seen on 4/3/2025 complaining of a mass has been there for a few months and is starting to get bigger. She is able to use her hands, but it is uncomfortable.  The discomfort is primarily in the ulnar wrist and is worse with twisting exercises.  No trauma.  It was recommended she obtain and MRI    Interval History:  Today, she is here to review her MRI.  She expressed that the left wrist is still quite symptomatic for her.  She does have pain around that area primarily when she bumps the wrist on things.  She notes pain with activities and does feel this is limiting.     In regards to her right wrist which she is s/p cyst excision on 5/27/25, she is doing very well. She reports she has been able to return to full use of the right wrist with no issues. The incision has healed well without issue.     Past Medical History:  Past Medical History:   Diagnosis Date    Allergic Several years ago    Arthritis Several years ago    Bronchitis     COVID 2023    Diabetes mellitus (HCC)     Elevated hemoglobin A1c     Pt states she does not take medication but is trying to lower with diet and exercise ( 7.1)    Hypertension     Kidney stone Several years ago    Lipoma of arm     LUE    Obesity Several years ago    Pneumonia     Sphincter of Oddi dysfunction     Visual impairment     Wears dentures     Wears glasses      Past Surgical History:   Procedure Laterality Date    BLADDER SUSPENSION      BREAST EXCISIONAL BIOPSY Left 2000    benign    BREAST SURGERY Left     lumpectomy    CHOLECYSTECTOMY      COLONOSCOPY      HYSTERECTOMY  1980    IR SPINE AND PAIN PROCEDURE  04/16/2024    IR SPINE AND PAIN PROCEDURE  11/05/2024    MASS EXCISION Right 5/27/2025    Procedure: Right wrist mass excision;  Surgeon: Bhaskar Schneider MD;  Location:  MAIN OR;  Service: Orthopedics    OOPHORECTOMY Bilateral 1980    DE EXC B9 LESION  MRGN XCP SK TG T/A/L 3.1-4.0 CM Left 2022    Procedure: UPPER ARM MASS EXCISION;  Surgeon: Krys Mendiola DO;  Location:  MAIN OR;  Service: General    RECTAL PROLAPSE REPAIR      TRANSDUODENAL SPHINCTEROTOMY / SPHINCTEROPLASTY      sphincter of oddi     Family History   Problem Relation Name Age of Onset    No Known Problems Mother      Diabetes Father Cullen Godoy     No Known Problems Sister      No Known Problems Daughter      Leukemia Maternal Grandmother      No Known Problems Maternal Grandfather      No Known Problems Paternal Grandmother      No Known Problems Paternal Grandfather      No Known Problems Maternal Aunt      No Known Problems Maternal Aunt      No Known Problems Paternal Aunt      No Known Problems Paternal Aunt      No Known Problems Sister      No Known Problems Daughter      BRCA2 Positive Neg Hx      BRCA2 Negative Neg Hx      BRCA1 Positive Neg Hx      BRCA1 Negative Neg Hx      BRCA 1/2 Neg Hx      Ovarian cancer Neg Hx      Endometrial cancer Neg Hx      Colon cancer Neg Hx      Breast cancer additional onset Neg Hx      Breast cancer Neg Hx       Social History     Socioeconomic History    Marital status: /Civil Union     Spouse name: Not on file    Number of children: Not on file    Years of education: Not on file    Highest education level: Not on file   Occupational History    Not on file   Tobacco Use    Smoking status: Former     Current packs/day: 0.00     Average packs/day: 0.5 packs/day for 21.4 years (10.7 ttl pk-yrs)     Types: Cigarettes     Start date: 1972     Quit date: 1993     Years since quittin.1     Passive exposure: Never    Smokeless tobacco: Never   Vaping Use    Vaping status: Never Used   Substance and Sexual Activity    Alcohol use: Never    Drug use: Never    Sexual activity: Not Currently     Partners: Male     Birth control/protection: None   Other Topics Concern    Not on file   Social History Narrative    Not on file  "    Social Drivers of Health     Financial Resource Strain: Not on file   Food Insecurity: Not on file   Transportation Needs: Not on file   Physical Activity: Not on file   Stress: Not on file   Social Connections: Unknown (6/18/2024)    Received from [employer]     How often do you feel lonely or isolated from those around you? (Adult - for ages 18 years and over): Not on file   Intimate Partner Violence: Not on file   Housing Stability: Not on file     Scheduled Meds:  Continuous Infusions:No current facility-administered medications for this visit.    PRN Meds:.  Allergies[1]    Physical Examination:    Ht 5' 2\" (1.575 m)   Wt 61.7 kg (136 lb)   BMI 24.87 kg/m²     Gen: A&Ox3, NAD  Cardiac: regular rate  Chest: non labored breathing  Abdomen: Non-distended    Cervcial Spine: Negative Spurling's    Left Upper Extremity:  Skin CDI  TTP about the fovea with mild fullness in the area. No obvious palpable mass.   Pain with ulnar deviation and grind. DRUJ is stable.   Sensation intact to light touch in the axillary median, ulnar, and radial nerve distributions  5/5 motor to FPL (AIN), EPL (PIN) and FDIO (ulnar)  2+RP    Right Upper Extremity:  Incision well healed without signs of infection  No mass recurrence  DRUJ stable to shuck testing.  No tenderness over the fovea or the DRUJ.  Sensation intact to light touch in the axillary median, ulnar, and radial nerve distributions  5/5 motor to FPL (AIN), EPL (PIN) and FDIO (ulnar)  2+RP    Studies:  MRI of the left wrist from 6/22/25 demonstrates a partial perfipheral tear of the TFCC at the ulnar styloid. There is a small ganglion cyst at the tip of the ulnar styloid but no other large mass.         Bhaskar Schneider MD  Hand and Upper Extremity Surgery      *This note was dictated using Dragon voice recognition software. Please excuse any word substitutions or errors.*    Scribe Attestation      I,:   am acting as a scribe while in the presence of " the attending physician.:       I,:   personally performed the services described in this documentation    as scribed in my presence.:                  [1]   Allergies  Allergen Reactions    Cayenne - Food Allergy Shortness Of Breath     Pt states she coughs and then can't take breaths in    Pineapple - Food Allergy Shortness Of Breath     Pt states she has a terrible cough and can't breath in    Demerol [Meperidine] Itching    Oxycodone Itching    Tramadol Hyperactivity    Tegaderm Alginate Ag Rope Rash and Swelling

## 2025-07-08 RX ORDER — SEMAGLUTIDE 2.4 MG/.75ML
2.4 INJECTION, SOLUTION SUBCUTANEOUS WEEKLY
Qty: 3 ML | Refills: 1 | Status: SHIPPED | OUTPATIENT
Start: 2025-07-08

## 2025-07-24 DIAGNOSIS — I10 HTN (HYPERTENSION): ICD-10-CM

## 2025-07-25 ENCOUNTER — OFFICE VISIT (OUTPATIENT)
Dept: OBGYN CLINIC | Facility: CLINIC | Age: 70
End: 2025-07-25

## 2025-07-25 VITALS — WEIGHT: 136.02 LBS | HEIGHT: 62 IN | BODY MASS INDEX: 25.03 KG/M2

## 2025-07-25 DIAGNOSIS — M24.132 DEGENERATIVE TEAR OF TRIANGULAR FIBROCARTILAGE COMPLEX (TFCC) OF LEFT WRIST: Primary | ICD-10-CM

## 2025-07-25 PROCEDURE — 99024 POSTOP FOLLOW-UP VISIT: CPT | Performed by: STUDENT IN AN ORGANIZED HEALTH CARE EDUCATION/TRAINING PROGRAM

## 2025-07-25 RX ORDER — AMLODIPINE BESYLATE 10 MG/1
10 TABLET ORAL DAILY
Qty: 90 TABLET | Refills: 1 | Status: SHIPPED | OUTPATIENT
Start: 2025-07-25

## 2025-07-25 NOTE — PROGRESS NOTES
Orthopedic Surgery Management Note  Jennifer Carrillo (70 y.o. 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    In regards to the cyst excision from her right ulnar wrist the patient has had a complete resolution of any swelling.  She reports that the pain she was having in her right wrist has completely resolved.  She has returned to using her right wrist normally and she is very happy with the function and appearance here.  The scar is minimal and difficult to appreciate.  Overall she states that she does not feel she needs any further follow-up for the right wrist and is happy with outcome.    In regards to the left wrist, the injection alleviated essentially all of her symptoms.  She has been using a wrist widget as well and notably she states that if she does not wear the wrist widget for a day or 2 she will start to develop some wrist discomfort again.  When she puts it back on her symptoms are alleviated once again.  We discussed treatment options for the left wrist and given the benefit she is receiving from the current plan I think we will continue.      As far as follow-up with me the patient expressed that she feels she is doing quite well and does not specifically need a follow-up.  Given that she has returned to doing all regular daily activities with no functional limitations and essentially no symptoms I think this is appropriate.  She will reach out if she has any symptoms    Return if symptoms worsen or fail to improve.         Chief Complaint:     Right wrist pain and mass    Previous History:   The patient was seen on 4/3/2025 complaining of a mass has been there for a few months and is starting to get bigger. She is  able to use her hands, but it is uncomfortable.  The discomfort is primarily in the ulnar wrist and is worse with twisting exercises.  No trauma.  It was recommended she obtain and MRI. MRI demonstrates a partial perfipheral tear of the TFCC at the ulnar styloid. There is a small ganglion cyst at the tip of the ulnar styloid but no other large mass. She was seen again on 6/27/2025 at which time the Mri was reviewed. She still had pain in the left wrist. She was provided a cortisone injection at that time.     Interval History:  Today, she notes relief from the last injection on the left wrist. She is wearing the digit widget. She feel that the left wrist is doing well at this time.     In regards to her right wrist which she is s/p cyst excision on 5/27/25, she is doing very well. She reports she has been able to return to full use of the right wrist with no issues. The incision has healed well without issue.     Past Medical History:  Past Medical History:   Diagnosis Date    Allergic Several years ago    Arthritis Several years ago    Bronchitis     COVID 2023    Diabetes mellitus (HCC)     Elevated hemoglobin A1c     Pt states she does not take medication but is trying to lower with diet and exercise ( 7.1)    Hypertension     Kidney stone Several years ago    Lipoma of arm     LUE    Obesity Several years ago    Pneumonia     Sphincter of Oddi dysfunction     Visual impairment     Wears dentures     Wears glasses      Past Surgical History:   Procedure Laterality Date    BLADDER SUSPENSION      BREAST EXCISIONAL BIOPSY Left 2000    benign    BREAST SURGERY Left     lumpectomy    CHOLECYSTECTOMY      COLONOSCOPY      HYSTERECTOMY  1980    IR SPINE AND PAIN PROCEDURE  04/16/2024    IR SPINE AND PAIN PROCEDURE  11/05/2024    MASS EXCISION Right 5/27/2025    Procedure: Right wrist mass excision;  Surgeon: Bhaskar Schneider MD;  Location:  [facility] OR;  Service: Orthopedics    OOPHORECTOMY Bilateral 1980    SD EXC B9  LESION MRGN XCP SK TG T/A/L 3.1-4.0 CM Left 2022    Procedure: UPPER ARM MASS EXCISION;  Surgeon: Krys Mendiola DO;  Location: [facility];  Service: General    RECTAL PROLAPSE REPAIR      TRANSDUODENAL SPHINCTEROTOMY / SPHINCTEROPLASTY      sphincter of oddi     Family History   Problem Relation Name Age of Onset    No Known Problems Mother      Diabetes Father Cullen Godoy     No Known Problems Sister      No Known Problems Daughter      Leukemia Maternal Grandmother      No Known Problems Maternal Grandfather      No Known Problems Paternal Grandmother      No Known Problems Paternal Grandfather      No Known Problems Maternal Aunt      No Known Problems Maternal Aunt      No Known Problems Paternal Aunt      No Known Problems Paternal Aunt      No Known Problems Sister      No Known Problems Daughter      BRCA2 Positive Neg Hx      BRCA2 Negative Neg Hx      BRCA1 Positive Neg Hx      BRCA1 Negative Neg Hx      BRCA 1/2 Neg Hx      Ovarian cancer Neg Hx      Endometrial cancer Neg Hx      Colon cancer Neg Hx      Breast cancer additional onset Neg Hx      Breast cancer Neg Hx       Social History     Socioeconomic History    Marital status: /Civil Union     Spouse name: Not on file    Number of children: Not on file    Years of education: Not on file    Highest education level: Not on file   Occupational History    Not on file   Tobacco Use    Smoking status: Former     Current packs/day: 0.00     Average packs/day: 0.5 packs/day for 21.4 years (10.7 ttl pk-yrs)     Types: Cigarettes     Start date: 1972     Quit date: 1993     Years since quittin.2     Passive exposure: Never    Smokeless tobacco: Never   Vaping Use    Vaping status: Never Used   Substance and Sexual Activity    Alcohol use: Never    Drug use: Never    Sexual activity: Not Currently     Partners: Male     Birth control/protection: None   Other Topics Concern    Not on file   Social History Narrative    Not on file  "    Social Drivers of Health     Financial Resource Strain: Not on file   Food Insecurity: Not on file   Transportation Needs: Not on file   Physical Activity: Not on file   Stress: Not on file   Social Connections: Unknown (6/18/2024)    Received from [employer]     How often do you feel lonely or isolated from those around you? (Adult - for ages 18 years and over): Not on file   Intimate Partner Violence: Not on file   Housing Stability: Not on file     Scheduled Meds:  Continuous Infusions:No current facility-administered medications for this visit.    PRN Meds:.  Allergies[1]    Physical Examination:    Ht 5' 2\" (1.575 m)   Wt 61.7 kg (136 lb 0.4 oz)   BMI 24.88 kg/m²     Gen: A&Ox3, NAD  Cardiac: regular rate  Chest: non labored breathing  Abdomen: Non-distended    Cervcial Spine: Negative Spurling's    Left Upper Extremity:  Skin CDI  No tenderness about the fovea or the ulnar wrist.  No tenderness with ulnar deviation grind.  DRUJ stable  Full and painless wrist range of motion  Sensation intact to light touch in the axillary median, ulnar, and radial nerve distributions  5/5 motor to FPL (AIN), EPL (PIN) and FDIO (ulnar)  2+RP    Right Upper Extremity:  Incision well healed without signs of infection  No mass recurrence  DRUJ stable to shuck testing.  No tenderness over the fovea or the DRUJ.  Sensation intact to light touch in the axillary median, ulnar, and radial nerve distributions  5/5 motor to FPL (AIN), EPL (PIN) and FDIO (ulnar)  2+RP    Studies:  No new images to review        Bhaskar Schneider MD  Hand and Upper Extremity Surgery      *This note was dictated using Dragon voice recognition software. Please excuse any word substitutions or errors.*    Scribe Attestation      I,:  Molly Bailey am acting as a scribe while in the presence of the attending physician.:       I,:  Bhaskar Schneider MD personally performed the services described in this documentation    as scribed in my " presence.:                  [1]   Allergies  Allergen Reactions    [place] - Food Allergy Shortness Of Breath     Pt states she coughs and then can't take breaths in    Pineapple - Food Allergy Shortness Of Breath     Pt states she has a terrible cough and can't breath in    Demerol [Meperidine] Itching    Oxycodone Itching    Tramadol Hyperactivity    Tegaderm Alginate Ag Rope Rash and Swelling

## 2025-08-07 DIAGNOSIS — M19.012 PRIMARY OSTEOARTHRITIS OF LEFT SHOULDER: ICD-10-CM

## 2025-08-07 DIAGNOSIS — M54.14 THORACIC RADICULITIS: ICD-10-CM

## 2025-08-07 RX ORDER — DULOXETIN HYDROCHLORIDE 30 MG/1
30 CAPSULE, DELAYED RELEASE ORAL DAILY
Qty: 90 CAPSULE | Refills: 0 | Status: CANCELLED | OUTPATIENT
Start: 2025-08-07

## (undated) DEVICE — NEPTUNE E-SEP SMOKE EVACUATION PENCIL, COATED, 70MM BLADE, PUSH BUTTON SWITCH: Brand: NEPTUNE E-SEP

## (undated) DEVICE — 10FR FRAZIER SUCTION HANDLE: Brand: CARDINAL HEALTH

## (undated) DEVICE — SUT VICRYL 4-0 PS-2 18 IN J496G

## (undated) DEVICE — 3M™ TEGADERM™ TRANSPARENT FILM DRESSING FRAME STYLE, 1628, 6 IN X 8 IN (15 CM X 20 CM), 10/CT 8CT/CASE: Brand: 3M™ TEGADERM™

## (undated) DEVICE — EXOFIN PRECISION PEN HIGH VISCOSITY TOPICAL SKIN ADHESIVE: Brand: EXOFIN PRECISION PEN, 1G

## (undated) DEVICE — ANTIBACTERIAL UNDYED BRAIDED (POLYGLACTIN 910), SYNTHETIC ABSORBABLE SUTURE: Brand: COATED VICRYL

## (undated) DEVICE — STERILE BETHLEHEM PLASTIC HAND: Brand: CARDINAL HEALTH

## (undated) DEVICE — DRAPE EQUIPMENT RF WAND

## (undated) DEVICE — DRAPE SHEET THREE QUARTER

## (undated) DEVICE — BULB SYRINGE, IRRIGATION WITH PROTECTIVE CAP, 60 CC, INDIVIDUALLY WRAPPED: Brand: DOVER

## (undated) DEVICE — INTENDED FOR TISSUE SEPARATION, AND OTHER PROCEDURES THAT REQUIRE A SHARP SURGICAL BLADE TO PUNCTURE OR CUT.: Brand: BARD-PARKER SAFETY BLADES SIZE 15, STERILE

## (undated) DEVICE — GLOVE INDICATOR PI UNDERGLOVE SZ 7 BLUE

## (undated) DEVICE — NEEDLE 25G X 1 1/2

## (undated) DEVICE — TUBING SUCTION 5MM X 12 FT

## (undated) DEVICE — MEDI-VAC YANK SUCT HNDL W/TPRD BULBOUS TIP: Brand: CARDINAL HEALTH

## (undated) DEVICE — SUT VICRYL 3-0 SH 27 IN J416H

## (undated) DEVICE — GLOVE SRG BIOGEL ECLIPSE 7

## (undated) DEVICE — PAD GROUNDING ADULT

## (undated) DEVICE — ADHESIVE SKIN HIGH VISCOSITY EXOFIN 1ML

## (undated) DEVICE — SUT MONOCRYL PLUS 4-0 PS-2 18 IN MCP496G

## (undated) DEVICE — GLOVE INDICATOR PI UNDERGLOVE SZ 7.5 BLUE

## (undated) DEVICE — TIBURON SPLIT SHEET: Brand: CONVERTORS

## (undated) DEVICE — GAUZE SPONGES,16 PLY: Brand: CURITY

## (undated) DEVICE — BETHLEHEM UNIVERSAL MINOR GEN: Brand: CARDINAL HEALTH